# Patient Record
Sex: FEMALE | Race: WHITE | URBAN - METROPOLITAN AREA
[De-identification: names, ages, dates, MRNs, and addresses within clinical notes are randomized per-mention and may not be internally consistent; named-entity substitution may affect disease eponyms.]

---

## 2018-02-13 ENCOUNTER — APPOINTMENT (RX ONLY)
Dept: URBAN - METROPOLITAN AREA CLINIC 4 | Facility: CLINIC | Age: 78
Setting detail: DERMATOLOGY
End: 2018-02-13

## 2018-02-13 DIAGNOSIS — D485 NEOPLASM OF UNCERTAIN BEHAVIOR OF SKIN: ICD-10-CM

## 2018-02-13 PROBLEM — Z85.828 PERSONAL HISTORY OF OTHER MALIGNANT NEOPLASM OF SKIN: Status: ACTIVE | Noted: 2018-02-13

## 2018-02-13 PROBLEM — E03.9 HYPOTHYROIDISM, UNSPECIFIED: Status: ACTIVE | Noted: 2018-02-13

## 2018-02-13 PROBLEM — D48.5 NEOPLASM OF UNCERTAIN BEHAVIOR OF SKIN: Status: ACTIVE | Noted: 2018-02-13

## 2018-02-13 PROBLEM — L57.0 ACTINIC KERATOSIS: Status: ACTIVE | Noted: 2018-02-13

## 2018-02-13 PROCEDURE — ? BIOPSY BY SHAVE METHOD

## 2018-02-13 PROCEDURE — 11100: CPT

## 2018-02-13 PROCEDURE — 11101: CPT

## 2018-02-13 ASSESSMENT — LOCATION DETAILED DESCRIPTION DERM
LOCATION DETAILED: RIGHT MEDIAL PROXIMAL PRETIBIAL REGION
LOCATION DETAILED: RIGHT RIB CAGE

## 2018-02-13 ASSESSMENT — LOCATION SIMPLE DESCRIPTION DERM
LOCATION SIMPLE: RIGHT PRETIBIAL REGION
LOCATION SIMPLE: ABDOMEN

## 2018-02-13 ASSESSMENT — LOCATION ZONE DERM
LOCATION ZONE: TRUNK
LOCATION ZONE: LEG

## 2018-02-13 NOTE — PROCEDURE: BIOPSY BY SHAVE METHOD
Billing Type: Third-Party Bill
Biopsy Type: H and E
Post-Care Instructions: I reviewed with the patient in detail post-care instructions. Patient is to keep the biopsy site dry overnight, and then apply bacitracin twice daily until healed. Patient may apply hydrogen peroxide soaks to remove any crusting.
Curettage Text: The wound bed was treated with curettage after the biopsy was performed.
Cryotherapy Text: The wound bed was treated with cryotherapy after the biopsy was performed.
Additional Anesthesia Volume In Cc (Will Not Render If 0): 0
Biopsy Method: Personna blade
Electrodesiccation Text: The wound bed was treated with electrodesiccation after the biopsy was performed.
Dressing: bandage
Bill For Surgical Tray: no
Silver Nitrate Text: The wound bed was treated with silver nitrate after the biopsy was performed.
Lab: [id number]
Anesthesia Type: 1% lidocaine with epinephrine
Detail Level: Detailed
Electrodesiccation And Curettage Text: The wound bed was treated with electrodesiccation and curettage after the biopsy was performed.
Destruction After The Procedure: Yes
Lab Facility: 
Anesthesia Volume In Cc: 2
Notification Instructions: Patient will be notified of biopsy results. However, patient instructed to call the office if not contacted within 2 weeks.
Hemostasis: Aluminum Chloride and Electrocautery
Wound Care: Vaseline
Consent: Written consent was obtained and risks were reviewed including but not limited to scarring, infection, bleeding, scabbing, incomplete removal, nerve damage and allergy to anesthesia.
Type Of Destruction Used: Electrodesiccation and Curettage
Type Of Destruction Used: Electrodesiccation

## 2018-02-13 NOTE — HPI: SKIN LESION
Is This A New Presentation, Or A Follow-Up?: Skin Lesion
How Severe Is Your Skin Lesion?: moderate
Has Your Skin Lesion Been Treated?: not been treated
Additional History: Spot on right calf for two months.

## 2018-03-14 ENCOUNTER — APPOINTMENT (RX ONLY)
Dept: URBAN - METROPOLITAN AREA CLINIC 4 | Facility: CLINIC | Age: 78
Setting detail: DERMATOLOGY
End: 2018-03-14

## 2018-03-14 PROBLEM — C44.722 SQUAMOUS CELL CARCINOMA OF SKIN OF RIGHT LOWER LIMB, INCLUDING HIP: Status: ACTIVE | Noted: 2018-03-14

## 2018-03-14 PROCEDURE — 12032 INTMD RPR S/A/T/EXT 2.6-7.5: CPT

## 2018-03-14 PROCEDURE — ? EXCISION

## 2018-03-14 PROCEDURE — 11602 EXC TR-EXT MAL+MARG 1.1-2 CM: CPT

## 2018-03-14 NOTE — PROCEDURE: EXCISION
Epidermal Autograft Text: The defect edges were debeveled with a #15 scalpel blade.  Given the location of the defect, shape of the defect and the proximity to free margins an epidermal autograft was deemed most appropriate.  Using a sterile surgical marker, the primary defect shape was transferred to the donor site. The epidermal graft was then harvested.  The skin graft was then placed in the primary defect and oriented appropriately.
Epidermal Closure Graft Donor Site (Optional): simple interrupted
Transposition Flap Text: The defect edges were debeveled with a #15 scalpel blade.  Given the location of the defect and the proximity to free margins a transposition flap was deemed most appropriate.  Using a sterile surgical marker, an appropriate transposition flap was drawn incorporating the defect.    The area thus outlined was incised deep to adipose tissue with a #15 scalpel blade.  The skin margins were undermined to an appropriate distance in all directions utilizing iris scissors.
Complex Repair And Xenograft Text: The defect edges were debeveled with a #15 scalpel blade.  The primary defect was closed partially with a complex linear closure.  Given the location of the defect, shape of the defect and the proximity to free margins a xenograft was deemed most appropriate to repair the remaining defect.  The graft was trimmed to fit the size of the remaining defect.  The graft was then placed in the primary defect, oriented appropriately, and sutured into place.
Ear Star Wedge Flap Text: The defect edges were debeveled with a #15 blade scalpel.  Given the location of the defect and the proximity to free margins (helical rim) an ear star wedge flap was deemed most appropriate.  Using a sterile surgical marker, the appropriate flap was drawn incorporating the defect and placing the expected incisions between the helical rim and antihelix where possible.  The area thus outlined was incised through and through with a #15 scalpel blade.
Hatchet Flap Text: The defect edges were debeveled with a #15 scalpel blade.  Given the location of the defect, shape of the defect and the proximity to free margins a hatchet flap was deemed most appropriate.  Using a sterile surgical marker, an appropriate hatchet flap was drawn incorporating the defect and placing the expected incisions within the relaxed skin tension lines where possible.    The area thus outlined was incised deep to adipose tissue with a #15 scalpel blade.  The skin margins were undermined to an appropriate distance in all directions utilizing iris scissors.
Patient Will Remove Sutures At Home?: No
Burow's Advancement Flap Text: The defect edges were debeveled with a #15 scalpel blade.  Given the location of the defect and the proximity to free margins a Burow's advancement flap was deemed most appropriate.  Using a sterile surgical marker, the appropriate advancement flap was drawn incorporating the defect and placing the expected incisions within the relaxed skin tension lines where possible.    The area thus outlined was incised deep to adipose tissue with a #15 scalpel blade.  The skin margins were undermined to an appropriate distance in all directions utilizing iris scissors.
Complex Repair And Melolabial Flap Text: The defect edges were debeveled with a #15 scalpel blade.  The primary defect was closed partially with a complex linear closure.  Given the location of the remaining defect, shape of the defect and the proximity to free margins a melolabial flap was deemed most appropriate for complete closure of the defect.  Using a sterile surgical marker, an appropriate advancement flap was drawn incorporating the defect and placing the expected incisions within the relaxed skin tension lines where possible.    The area thus outlined was incised deep to adipose tissue with a #15 scalpel blade.  The skin margins were undermined to an appropriate distance in all directions utilizing iris scissors.
Complex Repair And O-T Advancement Flap Text: The defect edges were debeveled with a #15 scalpel blade.  The primary defect was closed partially with a complex linear closure.  Given the location of the remaining defect, shape of the defect and the proximity to free margins an O-T advancement flap was deemed most appropriate for complete closure of the defect.  Using a sterile surgical marker, an appropriate advancement flap was drawn incorporating the defect and placing the expected incisions within the relaxed skin tension lines where possible.    The area thus outlined was incised deep to adipose tissue with a #15 scalpel blade.  The skin margins were undermined to an appropriate distance in all directions utilizing iris scissors.
Partial Purse String (Intermediate) Text: Given the location of the defect and the characteristics of the surrounding skin an intermediate purse string closure was deemed most appropriate.  Undermining was performed circumferentially around the surgical defect.  A purse string suture was then placed and tightened. Wound tension of the circular defect prevented complete closure of the wound.
Repair Type: Intermediate
Intermediate / Complex Repair - Final Wound Length In Cm: 3.3
Complex Repair Preamble Text (Leave Blank If You Do Not Want): Extensive wide undermining was performed.
Show Accession Variable: Yes
O-L Flap Text: The defect edges were debeveled with a #15 scalpel blade.  Given the location of the defect, shape of the defect and the proximity to free margins an O-L flap was deemed most appropriate.  Using a sterile surgical marker, an appropriate advancement flap was drawn incorporating the defect and placing the expected incisions within the relaxed skin tension lines where possible.    The area thus outlined was incised deep to adipose tissue with a #15 scalpel blade.  The skin margins were undermined to an appropriate distance in all directions utilizing iris scissors.
Purse String (Intermediate) Text: Given the location of the defect and the characteristics of the surrounding skin a purse string intermediate closure was deemed most appropriate.  Undermining was performed circumfirentially around the surgical defect.  A purse string suture was then placed and tightened.
Anesthesia Volume In Cc: 6
Trilobed Flap Text: The defect edges were debeveled with a #15 scalpel blade.  Given the location of the defect and the proximity to free margins a trilobed flap was deemed most appropriate.  Using a sterile surgical marker, an appropriate trilobed flap drawn around the defect.    The area thus outlined was incised deep to adipose tissue with a #15 scalpel blade.  The skin margins were undermined to an appropriate distance in all directions utilizing iris scissors.
Path Notes (To The Dermatopathologist): Please check margins.
Muscle Hinge Flap Text: The defect edges were debeveled with a #15 scalpel blade.  Given the size, depth and location of the defect and the proximity to free margins a muscle hinge flap was deemed most appropriate.  Using a sterile surgical marker, an appropriate hinge flap was drawn incorporating the defect. The area thus outlined was incised with a #15 scalpel blade.  The skin margins were undermined to an appropriate distance in all directions utilizing iris scissors.
Crescentic Advancement Flap Text: The defect edges were debeveled with a #15 scalpel blade.  Given the location of the defect and the proximity to free margins a crescentic advancement flap was deemed most appropriate.  Using a sterile surgical marker, the appropriate advancement flap was drawn incorporating the defect and placing the expected incisions within the relaxed skin tension lines where possible.    The area thus outlined was incised deep to adipose tissue with a #15 scalpel blade.  The skin margins were undermined to an appropriate distance in all directions utilizing iris scissors.
O-Z Plasty Text: The defect edges were debeveled with a #15 scalpel blade.  Given the location of the defect, shape of the defect and the proximity to free margins an O-Z plasty (double transposition flap) was deemed most appropriate.  Using a sterile surgical marker, the appropriate transposition flaps were drawn incorporating the defect and placing the expected incisions within the relaxed skin tension lines where possible.    The area thus outlined was incised deep to adipose tissue with a #15 scalpel blade.  The skin margins were undermined to an appropriate distance in all directions utilizing iris scissors.  Hemostasis was achieved with electrocautery.  The flaps were then transposed into place, one clockwise and the other counterclockwise, and anchored with interrupted buried subcutaneous sutures.
Complex Repair And Rotation Flap Text: The defect edges were debeveled with a #15 scalpel blade.  The primary defect was closed partially with a complex linear closure.  Given the location of the remaining defect, shape of the defect and the proximity to free margins a rotation flap was deemed most appropriate for complete closure of the defect.  Using a sterile surgical marker, an appropriate advancement flap was drawn incorporating the defect and placing the expected incisions within the relaxed skin tension lines where possible.    The area thus outlined was incised deep to adipose tissue with a #15 scalpel blade.  The skin margins were undermined to an appropriate distance in all directions utilizing iris scissors.
Suture Removal: 12 days
V-Y Flap Text: The defect edges were debeveled with a #15 scalpel blade.  Given the location of the defect, shape of the defect and the proximity to free margins a V-Y flap was deemed most appropriate.  Using a sterile surgical marker, an appropriate advancement flap was drawn incorporating the defect and placing the expected incisions within the relaxed skin tension lines where possible.    The area thus outlined was incised deep to adipose tissue with a #15 scalpel blade.  The skin margins were undermined to an appropriate distance in all directions utilizing iris scissors.
Cheek Interpolation Flap Text: A decision was made to reconstruct the defect utilizing an interpolation axial flap and a staged reconstruction.  A telfa template was made of the defect.  This telfa template was then used to outline the Cheek Interpolation flap.  The donor area for the pedicle flap was then injected with anesthesia.  The flap was excised through the skin and subcutaneous tissue down to the layer of the underlying musculature.  The interpolation flap was carefully excised within this deep plane to maintain its blood supply.  The edges of the donor site were undermined.   The donor site was closed in a primary fashion.  The pedicle was then rotated into position and sutured.  Once the tube was sutured into place, adequate blood supply was confirmed with blanching and refill.  The pedicle was then wrapped with xeroform gauze and dressed appropriately with a telfa and gauze bandage to ensure continued blood supply and protect the attached pedicle.
Cheek-To-Nose Interpolation Flap Text: A decision was made to reconstruct the defect utilizing an interpolation axial flap and a staged reconstruction.  A telfa template was made of the defect.  This telfa template was then used to outline the Cheek-To-Nose Interpolation flap.  The donor area for the pedicle flap was then injected with anesthesia.  The flap was excised through the skin and subcutaneous tissue down to the layer of the underlying musculature.  The interpolation flap was carefully excised within this deep plane to maintain its blood supply.  The edges of the donor site were undermined.   The donor site was closed in a primary fashion.  The pedicle was then rotated into position and sutured.  Once the tube was sutured into place, adequate blood supply was confirmed with blanching and refill.  The pedicle was then wrapped with xeroform gauze and dressed appropriately with a telfa and gauze bandage to ensure continued blood supply and protect the attached pedicle.
Crescentic Intermediate Repair Preamble Text (Leave Blank If You Do Not Want): Undermining was performed with blunt dissection.
Perilesional Excision Additional Text (Leave Blank If You Do Not Want): The margin was drawn around the clinically apparent lesion. Incisions were then made along these lines to the appropriate tissue plane and the lesion was extirpated.
Epidermal Closure: running cuticular
Star Wedge Flap Text: The defect edges were debeveled with a #15 scalpel blade.  Given the location of the defect, shape of the defect and the proximity to free margins a star wedge flap was deemed most appropriate.  Using a sterile surgical marker, an appropriate rotation flap was drawn incorporating the defect and placing the expected incisions within the relaxed skin tension lines where possible. The area thus outlined was incised deep to adipose tissue with a #15 scalpel blade.  The skin margins were undermined to an appropriate distance in all directions utilizing iris scissors.
Complex Repair And Double M Plasty Text: The defect edges were debeveled with a #15 scalpel blade.  The primary defect was closed partially with a complex linear closure.  Given the location of the remaining defect, shape of the defect and the proximity to free margins a double M plasty was deemed most appropriate for complete closure of the defect.  Using a sterile surgical marker, an appropriate advancement flap was drawn incorporating the defect and placing the expected incisions within the relaxed skin tension lines where possible.    The area thus outlined was incised deep to adipose tissue with a #15 scalpel blade.  The skin margins were undermined to an appropriate distance in all directions utilizing iris scissors.
Estimated Blood Loss (Cc): minimal
Complex Repair And Bilobe Flap Text: The defect edges were debeveled with a #15 scalpel blade.  The primary defect was closed partially with a complex linear closure.  Given the location of the remaining defect, shape of the defect and the proximity to free margins a bilobe flap was deemed most appropriate for complete closure of the defect.  Using a sterile surgical marker, an appropriate advancement flap was drawn incorporating the defect and placing the expected incisions within the relaxed skin tension lines where possible.    The area thus outlined was incised deep to adipose tissue with a #15 scalpel blade.  The skin margins were undermined to an appropriate distance in all directions utilizing iris scissors.
Rotation Flap Text: The defect edges were debeveled with a #15 scalpel blade.  Given the location of the defect, shape of the defect and the proximity to free margins a rotation flap was deemed most appropriate.  Using a sterile surgical marker, an appropriate rotation flap was drawn incorporating the defect and placing the expected incisions within the relaxed skin tension lines where possible.    The area thus outlined was incised deep to adipose tissue with a #15 scalpel blade.  The skin margins were undermined to an appropriate distance in all directions utilizing iris scissors.
Mucosal Advancement Flap Text: Given the location of the defect, shape of the defect and the proximity to free margins a mucosal advancement flap was deemed most appropriate. Incisions were made with a 15 blade scalpel in the appropriate fashion along the cutaneous vermilion border and the mucosal lip. The remaining actinically damaged mucosal tissue was excised.  The mucosal advancement flap was then elevated to the gingival sulcus with care taken to preserve the neurovascular structures and advanced into the primary defect. Care was taken to ensure that precise realignment of the vermilion border was achieved.
Complex Repair And Skin Substitute Graft Text: The defect edges were debeveled with a #15 scalpel blade.  The primary defect was closed partially with a complex linear closure.  Given the location of the remaining defect, shape of the defect and the proximity to free margins a skin substitute graft was deemed most appropriate to repair the remaining defect.  The graft was trimmed to fit the size of the remaining defect.  The graft was then placed in the primary defect, oriented appropriately, and sutured into place.
Excisional Biopsy Additional Text (Leave Blank If You Do Not Want): The margin was drawn around the clinically apparent lesion. An elliptical shape was then drawn on the skin incorporating the lesion and margins.  Incisions were then made along these lines to the appropriate tissue plane and the lesion was extirpated.
A-T Advancement Flap Text: The defect edges were debeveled with a #15 scalpel blade.  Given the location of the defect, shape of the defect and the proximity to free margins an A-T advancement flap was deemed most appropriate.  Using a sterile surgical marker, an appropriate advancement flap was drawn incorporating the defect and placing the expected incisions within the relaxed skin tension lines where possible.    The area thus outlined was incised deep to adipose tissue with a #15 scalpel blade.  The skin margins were undermined to an appropriate distance in all directions utilizing iris scissors.
Number Of Deep Sutures (Optional): 2
Complex Repair And Z Plasty Text: The defect edges were debeveled with a #15 scalpel blade.  The primary defect was closed partially with a complex linear closure.  Given the location of the remaining defect, shape of the defect and the proximity to free margins a Z plasty was deemed most appropriate for complete closure of the defect.  Using a sterile surgical marker, an appropriate advancement flap was drawn incorporating the defect and placing the expected incisions within the relaxed skin tension lines where possible.    The area thus outlined was incised deep to adipose tissue with a #15 scalpel blade.  The skin margins were undermined to an appropriate distance in all directions utilizing iris scissors.
Bilateral Helical Rim Advancement Flap Text: The defect edges were debeveled with a #15 blade scalpel.  Given the location of the defect and the proximity to free margins (helical rim) a bilateral helical rim advancement flap was deemed most appropriate.  Using a sterile surgical marker, the appropriate advancement flaps were drawn incorporating the defect and placing the expected incisions between the helical rim and antihelix where possible.  The area thus outlined was incised through and through with a #15 scalpel blade.  With a skin hook and iris scissors, the flaps were gently and sharply undermined and freed up.
Complex Repair And O-L Flap Text: The defect edges were debeveled with a #15 scalpel blade.  The primary defect was closed partially with a complex linear closure.  Given the location of the remaining defect, shape of the defect and the proximity to free margins an O-L flap was deemed most appropriate for complete closure of the defect.  Using a sterile surgical marker, an appropriate flap was drawn incorporating the defect and placing the expected incisions within the relaxed skin tension lines where possible.    The area thus outlined was incised deep to adipose tissue with a #15 scalpel blade.  The skin margins were undermined to an appropriate distance in all directions utilizing iris scissors.
Purse String (Simple) Text: Given the location of the defect and the characteristics of the surrounding skin a purse string simple closure was deemed most appropriate.  Undermining was performed circumferentially around the surgical defect.  A purse string suture was then placed and tightened.
Lab: [id number]
Complex Repair And M Plasty Text: The defect edges were debeveled with a #15 scalpel blade.  The primary defect was closed partially with a complex linear closure.  Given the location of the remaining defect, shape of the defect and the proximity to free margins an M plasty was deemed most appropriate for complete closure of the defect.  Using a sterile surgical marker, an appropriate advancement flap was drawn incorporating the defect and placing the expected incisions within the relaxed skin tension lines where possible.    The area thus outlined was incised deep to adipose tissue with a #15 scalpel blade.  The skin margins were undermined to an appropriate distance in all directions utilizing iris scissors.
S Plasty Text: Given the location and shape of the defect, and the orientation of relaxed skin tension lines, an S-plasty was deemed most appropriate for repair.  Using a sterile surgical marker, the appropriate outline of the S-plasty was drawn, incorporating the defect and placing the expected incisions within the relaxed skin tension lines where possible.  The area thus outlined was incised deep to adipose tissue with a #15 scalpel blade.  The skin margins were undermined to an appropriate distance in all directions utilizing iris scissors. The skin flaps were advanced over the defect.  The opposing margins were then approximated with interrupted buried subcutaneous sutures.
Ftsg Text: The defect edges were debeveled with a #15 scalpel blade.  Given the location of the defect, shape of the defect and the proximity to free margins a full thickness skin graft was deemed most appropriate.  Using a sterile surgical marker, the primary defect shape was transferred to the donor site. The area thus outlined was incised deep to adipose tissue with a #15 scalpel blade.  The harvested graft was then trimmed of adipose tissue until only dermis and epidermis was left.  The skin margins of the secondary defect were undermined to an appropriate distance in all directions utilizing iris scissors.  The secondary defect was closed with interrupted buried subcutaneous sutures.  The skin edges were then re-apposed with running  sutures.  The skin graft was then placed in the primary defect and oriented appropriately.
Complex Repair And Double Advancement Flap Text: The defect edges were debeveled with a #15 scalpel blade.  The primary defect was closed partially with a complex linear closure.  Given the location of the remaining defect, shape of the defect and the proximity to free margins a double advancement flap was deemed most appropriate for complete closure of the defect.  Using a sterile surgical marker, an appropriate advancement flap was drawn incorporating the defect and placing the expected incisions within the relaxed skin tension lines where possible.    The area thus outlined was incised deep to adipose tissue with a #15 scalpel blade.  The skin margins were undermined to an appropriate distance in all directions utilizing iris scissors.
Secondary Defect Width (In Cm): 0
Split-Thickness Skin Graft Text: The defect edges were debeveled with a #15 scalpel blade.  Given the location of the defect, shape of the defect and the proximity to free margins a split thickness skin graft was deemed most appropriate.  Using a sterile surgical marker, the primary defect shape was transferred to the donor site. The split thickness graft was then harvested.  The skin graft was then placed in the primary defect and oriented appropriately.
Dressing: pressure dressing
Melolabial Transposition Flap Text: The defect edges were debeveled with a #15 scalpel blade.  Given the location of the defect and the proximity to free margins a melolabial flap was deemed most appropriate.  Using a sterile surgical marker, an appropriate melolabial transposition flap was drawn incorporating the defect.    The area thus outlined was incised deep to adipose tissue with a #15 scalpel blade.  The skin margins were undermined to an appropriate distance in all directions utilizing iris scissors.
Repair Performed By Another Provider Text (Leave Blank If You Do Not Want): After the tissue was excised the defect was repaired by another provider.
Z Plasty Text: The lesion was extirpated to the level of the fat with a #15 scalpel blade.  Given the location of the defect, shape of the defect and the proximity to free margins a Z-plasty was deemed most appropriate for repair.  Using a sterile surgical marker, the appropriate transposition arms of the Z-plasty were drawn incorporating the defect and placing the expected incisions within the relaxed skin tension lines where possible.    The area thus outlined was incised deep to adipose tissue with a #15 scalpel blade.  The skin margins were undermined to an appropriate distance in all directions utilizing iris scissors.  The opposing transposition arms were then transposed into place in opposite direction and anchored with interrupted buried subcutaneous sutures.
Spiral Flap Text: The defect edges were debeveled with a #15 scalpel blade.  Given the location of the defect, shape of the defect and the proximity to free margins a spiral flap was deemed most appropriate.  Using a sterile surgical marker, an appropriate rotation flap was drawn incorporating the defect and placing the expected incisions within the relaxed skin tension lines where possible. The area thus outlined was incised deep to adipose tissue with a #15 scalpel blade.  The skin margins were undermined to an appropriate distance in all directions utilizing iris scissors.
Double Island Pedicle Flap Text: The defect edges were debeveled with a #15 scalpel blade.  Given the location of the defect, shape of the defect and the proximity to free margins a double island pedicle advancement flap was deemed most appropriate.  Using a sterile surgical marker, an appropriate advancement flap was drawn incorporating the defect, outlining the appropriate donor tissue and placing the expected incisions within the relaxed skin tension lines where possible.    The area thus outlined was incised deep to adipose tissue with a #15 scalpel blade.  The skin margins were undermined to an appropriate distance in all directions around the primary defect and laterally outward around the island pedicle utilizing iris scissors.  There was minimal undermining beneath the pedicle flap.
Lip Wedge Excision Repair Text: Given the location of the defect and the proximity to free margins a full thickness wedge repair was deemed most appropriate.  Using a sterile surgical marker, the appropriate repair was drawn incorporating the defect and placing the expected incisions perpendicular to the vermilion border.  The vermilion border was also meticulously outlined to ensure appropriate reapproximation during the repair.  The area thus outlined was incised through and through with a #15 scalpel blade.  The muscularis and dermis were reaproximated with deep sutures following hemostasis. Care was taken to realign the vermilion border before proceeding with the superficial closure.  Once the vermilion was realigned the superfical and mucosal closure was finished.
No Repair - Repaired With Adjacent Surgical Defect Text (Leave Blank If You Do Not Want): After the excision the defect was repaired concurrently with another surgical defect which was in close approximation.
Size Of Lesion In Cm: 0.7
Complex Repair And V-Y Plasty Text: The defect edges were debeveled with a #15 scalpel blade.  The primary defect was closed partially with a complex linear closure.  Given the location of the remaining defect, shape of the defect and the proximity to free margins a V-Y plasty was deemed most appropriate for complete closure of the defect.  Using a sterile surgical marker, an appropriate advancement flap was drawn incorporating the defect and placing the expected incisions within the relaxed skin tension lines where possible.    The area thus outlined was incised deep to adipose tissue with a #15 scalpel blade.  The skin margins were undermined to an appropriate distance in all directions utilizing iris scissors.
Alar Island Pedicle Flap Text: The defect edges were debeveled with a #15 scalpel blade.  Given the location of the defect, shape of the defect and the proximity to the alar rim an island pedicle advancement flap was deemed most appropriate.  Using a sterile surgical marker, an appropriate advancement flap was drawn incorporating the defect, outlining the appropriate donor tissue and placing the expected incisions within the nasal ala running parallel to the alar rim. The area thus outlined was incised with a #15 scalpel blade.  The skin margins were undermined minimally to an appropriate distance in all directions around the primary defect and laterally outward around the island pedicle utilizing iris scissors.  There was minimal undermining beneath the pedicle flap.
Modified Advancement Flap Text: The defect edges were debeveled with a #15 scalpel blade.  Given the location of the defect, shape of the defect and the proximity to free margins a modified advancement flap was deemed most appropriate.  Using a sterile surgical marker, an appropriate advancement flap was drawn incorporating the defect and placing the expected incisions within the relaxed skin tension lines where possible.    The area thus outlined was incised deep to adipose tissue with a #15 scalpel blade.  The skin margins were undermined to an appropriate distance in all directions utilizing iris scissors.
Bilobed Transposition Flap Text: The defect edges were debeveled with a #15 scalpel blade.  Given the location of the defect and the proximity to free margins a bilobed transposition flap was deemed most appropriate.  Using a sterile surgical marker, an appropriate bilobe flap drawn around the defect.    The area thus outlined was incised deep to adipose tissue with a #15 scalpel blade.  The skin margins were undermined to an appropriate distance in all directions utilizing iris scissors.
H Plasty Text: Given the location of the defect, shape of the defect and the proximity to free margins a H-plasty was deemed most appropriate for repair.  Using a sterile surgical marker, the appropriate advancement arms of the H-plasty were drawn incorporating the defect and placing the expected incisions within the relaxed skin tension lines where possible. The area thus outlined was incised deep to adipose tissue with a #15 scalpel blade. The skin margins were undermined to an appropriate distance in all directions utilizing iris scissors.  The opposing advancement arms were then advanced into place in opposite direction and anchored with interrupted buried subcutaneous sutures.
V-Y Plasty Text: The defect edges were debeveled with a #15 scalpel blade.  Given the location of the defect, shape of the defect and the proximity to free margins an V-Y advancement flap was deemed most appropriate.  Using a sterile surgical marker, an appropriate advancement flap was drawn incorporating the defect and placing the expected incisions within the relaxed skin tension lines where possible.    The area thus outlined was incised deep to adipose tissue with a #15 scalpel blade.  The skin margins were undermined to an appropriate distance in all directions utilizing iris scissors.
Paramedian Forehead Flap Text: A decision was made to reconstruct the defect utilizing an interpolation axial flap and a staged reconstruction.  A telfa template was made of the defect.  This telfa template was then used to outline the paramedian forehead pedicle flap.  The donor area for the pedicle flap was then injected with anesthesia.  The flap was excised through the skin and subcutaneous tissue down to the layer of the underlying musculature.  The pedicle flap was carefully excised within this deep plane to maintain its blood supply.  The edges of the donor site were undermined.   The donor site was closed in a primary fashion.  The pedicle was then rotated into position and sutured.  Once the tube was sutured into place, adequate blood supply was confirmed with blanching and refill.  The pedicle was then wrapped with xeroform gauze and dressed appropriately with a telfa and gauze bandage to ensure continued blood supply and protect the attached pedicle.
Graft Donor Site Bandage (Optional-Leave Blank If You Don't Want In Note): Steri-strips and a pressure bandage were applied to the donor site.
Tissue Cultured Epidermal Autograft Text: The defect edges were debeveled with a #15 scalpel blade.  Given the location of the defect, shape of the defect and the proximity to free margins a tissue cultured epidermal autograft was deemed most appropriate.  The graft was then trimmed to fit the size of the defect.  The graft was then placed in the primary defect and oriented appropriately.
Saucerization Excision Additional Text (Leave Blank If You Do Not Want): The margin was drawn around the clinically apparent lesion.  Incisions were then made along these lines, in a tangential fashion, to the appropriate tissue plane and the lesion was extirpated.
Slit Excision Additional Text (Leave Blank If You Do Not Want): A linear line was drawn on the skin overlying the lesion. An incision was made slowly until the lesion was visualized.  Once visualized, the lesion was removed with blunt dissection.
Skin Substitute Text: The defect edges were debeveled with a #15 scalpel blade.  Given the location of the defect, shape of the defect and the proximity to free margins a skin substitute graft was deemed most appropriate.  The graft material was trimmed to fit the size of the defect. The graft was then placed in the primary defect and oriented appropriately.
Helical Rim Advancement Flap Text: The defect edges were debeveled with a #15 blade scalpel.  Given the location of the defect and the proximity to free margins (helical rim) a double helical rim advancement flap was deemed most appropriate.  Using a sterile surgical marker, the appropriate advancement flaps were drawn incorporating the defect and placing the expected incisions between the helical rim and antihelix where possible.  The area thus outlined was incised through and through with a #15 scalpel blade.  With a skin hook and iris scissors, the flaps were gently and sharply undermined and freed up.
Complex Repair And Dorsal Nasal Flap Text: The defect edges were debeveled with a #15 scalpel blade.  The primary defect was closed partially with a complex linear closure.  Given the location of the remaining defect, shape of the defect and the proximity to free margins a dorsal nasal flap was deemed most appropriate for complete closure of the defect.  Using a sterile surgical marker, an appropriate flap was drawn incorporating the defect and placing the expected incisions within the relaxed skin tension lines where possible.    The area thus outlined was incised deep to adipose tissue with a #15 scalpel blade.  The skin margins were undermined to an appropriate distance in all directions utilizing iris scissors.
Cartilage Graft Text: The defect edges were debeveled with a #15 scalpel blade.  Given the location of the defect, shape of the defect, the fact the defect involved a full thickness cartilage defect a cartilage graft was deemed most appropriate.  An appropriate donor site was identified, cleansed, and anesthetized. The cartilage graft was then harvested and transferred to the recipient site, oriented appropriately and then sutured into place.  The secondary defect was then repaired using a primary closure.
Island Pedicle Flap With Canthal Suspension Text: The defect edges were debeveled with a #15 scalpel blade.  Given the location of the defect, shape of the defect and the proximity to free margins an island pedicle advancement flap was deemed most appropriate.  Using a sterile surgical marker, an appropriate advancement flap was drawn incorporating the defect, outlining the appropriate donor tissue and placing the expected incisions within the relaxed skin tension lines where possible. The area thus outlined was incised deep to adipose tissue with a #15 scalpel blade.  The skin margins were undermined to an appropriate distance in all directions around the primary defect and laterally outward around the island pedicle utilizing iris scissors.  There was minimal undermining beneath the pedicle flap. A suspension suture was placed in the canthal tendon to prevent tension and prevent ectropion.
Melolabial Interpolation Flap Text: A decision was made to reconstruct the defect utilizing an interpolation axial flap and a staged reconstruction.  A telfa template was made of the defect.  This telfa template was then used to outline the melolabial interpolation flap.  The donor area for the pedicle flap was then injected with anesthesia.  The flap was excised through the skin and subcutaneous tissue down to the layer of the underlying musculature.  The pedicle flap was carefully excised within this deep plane to maintain its blood supply.  The edges of the donor site were undermined.   The donor site was closed in a primary fashion.  The pedicle was then rotated into position and sutured.  Once the tube was sutured into place, adequate blood supply was confirmed with blanching and refill.  The pedicle was then wrapped with xeroform gauze and dressed appropriately with a telfa and gauze bandage to ensure continued blood supply and protect the attached pedicle.
Rhombic Flap Text: The defect edges were debeveled with a #15 scalpel blade.  Given the location of the defect and the proximity to free margins a rhombic flap was deemed most appropriate.  Using a sterile surgical marker, an appropriate rhombic flap was drawn incorporating the defect.    The area thus outlined was incised deep to adipose tissue with a #15 scalpel blade.  The skin margins were undermined to an appropriate distance in all directions utilizing iris scissors.
Scalpel Size: 15 blade
Keystone Flap Text: The defect edges were debeveled with a #15 scalpel blade.  Given the location of the defect, shape of the defect a keystone flap was deemed most appropriate.  Using a sterile surgical marker, an appropriate keystone flap was drawn incorporating the defect, outlining the appropriate donor tissue and placing the expected incisions within the relaxed skin tension lines where possible. The area thus outlined was incised deep to adipose tissue with a #15 scalpel blade.  The skin margins were undermined to an appropriate distance in all directions around the primary defect and laterally outward around the flap utilizing iris scissors.
Wound Care: Vaseline
Complex Repair And Modified Advancement Flap Text: The defect edges were debeveled with a #15 scalpel blade.  The primary defect was closed partially with a complex linear closure.  Given the location of the remaining defect, shape of the defect and the proximity to free margins a modified advancement flap was deemed most appropriate for complete closure of the defect.  Using a sterile surgical marker, an appropriate advancement flap was drawn incorporating the defect and placing the expected incisions within the relaxed skin tension lines where possible.    The area thus outlined was incised deep to adipose tissue with a #15 scalpel blade.  The skin margins were undermined to an appropriate distance in all directions utilizing iris scissors.
Surgeon Performing The Repair (Optional): [place]
Advancement Flap (Single) Text: The defect edges were debeveled with a #15 scalpel blade.  Given the location of the defect and the proximity to free margins a single advancement flap was deemed most appropriate.  Using a sterile surgical marker, an appropriate advancement flap was drawn incorporating the defect and placing the expected incisions within the relaxed skin tension lines where possible.    The area thus outlined was incised deep to adipose tissue with a #15 scalpel blade.  The skin margins were undermined to an appropriate distance in all directions utilizing iris scissors.
Bilobed Flap Text: The defect edges were debeveled with a #15 scalpel blade.  Given the location of the defect and the proximity to free margins a bilobe flap was deemed most appropriate.  Using a sterile surgical marker, an appropriate bilobe flap drawn around the defect.    The area thus outlined was incised deep to adipose tissue with a #15 scalpel blade.  The skin margins were undermined to an appropriate distance in all directions utilizing iris scissors.
Billing Type: Third-Party Bill
Island Pedicle Flap-Requiring Vessel Identification Text: The defect edges were debeveled with a #15 scalpel blade.  Given the location of the defect, shape of the defect and the proximity to free margins an island pedicle advancement flap was deemed most appropriate.  Using a sterile surgical marker, an appropriate advancement flap was drawn, based on the axial vessel mentioned above, incorporating the defect, outlining the appropriate donor tissue and placing the expected incisions within the relaxed skin tension lines where possible.    The area thus outlined was incised deep to adipose tissue with a #15 scalpel blade.  The skin margins were undermined to an appropriate distance in all directions around the primary defect and laterally outward around the island pedicle utilizing iris scissors.  There was minimal undermining beneath the pedicle flap.
Consent was obtained from the patient. The risks and benefits to therapy were discussed in detail. Specifically, the risks of infection, scarring, bleeding, prolonged wound healing, incomplete removal, allergy to anesthesia, nerve injury and recurrence were addressed. Prior to the procedure, the treatment site was clearly identified and confirmed by the patient. All components of Universal Protocol/PAUSE Rule completed.
Fusiform Excision Additional Text (Leave Blank If You Do Not Want): The margin was drawn around the clinically apparent lesion.  A fusiform shape was then drawn on the skin incorporating the lesion and margins.  Incisions were then made along these lines to the appropriate tissue plane and the lesion was extirpated.
Complex Repair And Single Advancement Flap Text: The defect edges were debeveled with a #15 scalpel blade.  The primary defect was closed partially with a complex linear closure.  Given the location of the remaining defect, shape of the defect and the proximity to free margins a single advancement flap was deemed most appropriate for complete closure of the defect.  Using a sterile surgical marker, an appropriate advancement flap was drawn incorporating the defect and placing the expected incisions within the relaxed skin tension lines where possible.    The area thus outlined was incised deep to adipose tissue with a #15 scalpel blade.  The skin margins were undermined to an appropriate distance in all directions utilizing iris scissors.
Complex Repair And Rhombic Flap Text: The defect edges were debeveled with a #15 scalpel blade.  The primary defect was closed partially with a complex linear closure.  Given the location of the remaining defect, shape of the defect and the proximity to free margins a rhombic flap was deemed most appropriate for complete closure of the defect.  Using a sterile surgical marker, an appropriate advancement flap was drawn incorporating the defect and placing the expected incisions within the relaxed skin tension lines where possible.    The area thus outlined was incised deep to adipose tissue with a #15 scalpel blade.  The skin margins were undermined to an appropriate distance in all directions utilizing iris scissors.
Lab Facility: 
Dermal Autograft Text: The defect edges were debeveled with a #15 scalpel blade.  Given the location of the defect, shape of the defect and the proximity to free margins a dermal autograft was deemed most appropriate.  Using a sterile surgical marker, the primary defect shape was transferred to the donor site. The area thus outlined was incised deep to adipose tissue with a #15 scalpel blade.  The harvested graft was then trimmed of adipose and epidermal tissue until only dermis was left.  The skin graft was then placed in the primary defect and oriented appropriately.
O-T Advancement Flap Text: The defect edges were debeveled with a #15 scalpel blade.  Given the location of the defect, shape of the defect and the proximity to free margins an O-T advancement flap was deemed most appropriate.  Using a sterile surgical marker, an appropriate advancement flap was drawn incorporating the defect and placing the expected incisions within the relaxed skin tension lines where possible.    The area thus outlined was incised deep to adipose tissue with a #15 scalpel blade.  The skin margins were undermined to an appropriate distance in all directions utilizing iris scissors.
Complex Repair And Ftsg Text: The defect edges were debeveled with a #15 scalpel blade.  The primary defect was closed partially with a complex linear closure.  Given the location of the defect, shape of the defect and the proximity to free margins a full thickness skin graft was deemed most appropriate to repair the remaining defect.  The graft was trimmed to fit the size of the remaining defect.  The graft was then placed in the primary defect, oriented appropriately, and sutured into place.
Deep Sutures: 4-0 PGA
Island Pedicle Flap Text: The defect edges were debeveled with a #15 scalpel blade.  Given the location of the defect, shape of the defect and the proximity to free margins an island pedicle advancement flap was deemed most appropriate.  Using a sterile surgical marker, an appropriate advancement flap was drawn incorporating the defect, outlining the appropriate donor tissue and placing the expected incisions within the relaxed skin tension lines where possible.    The area thus outlined was incised deep to adipose tissue with a #15 scalpel blade.  The skin margins were undermined to an appropriate distance in all directions around the primary defect and laterally outward around the island pedicle utilizing iris scissors.  There was minimal undermining beneath the pedicle flap.
Complex Repair And Transposition Flap Text: The defect edges were debeveled with a #15 scalpel blade.  The primary defect was closed partially with a complex linear closure.  Given the location of the remaining defect, shape of the defect and the proximity to free margins a transposition flap was deemed most appropriate for complete closure of the defect.  Using a sterile surgical marker, an appropriate advancement flap was drawn incorporating the defect and placing the expected incisions within the relaxed skin tension lines where possible.    The area thus outlined was incised deep to adipose tissue with a #15 scalpel blade.  The skin margins were undermined to an appropriate distance in all directions utilizing iris scissors.
W Plasty Text: The lesion was extirpated to the level of the fat with a #15 scalpel blade.  Given the location of the defect, shape of the defect and the proximity to free margins a W-plasty was deemed most appropriate for repair.  Using a sterile surgical marker, the appropriate transposition arms of the W-plasty were drawn incorporating the defect and placing the expected incisions within the relaxed skin tension lines where possible.    The area thus outlined was incised deep to adipose tissue with a #15 scalpel blade.  The skin margins were undermined to an appropriate distance in all directions utilizing iris scissors.  The opposing transposition arms were then transposed into place in opposite direction and anchored with interrupted buried subcutaneous sutures.
Complex Repair And Tissue Cultured Epidermal Autograft Text: The defect edges were debeveled with a #15 scalpel blade.  The primary defect was closed partially with a complex linear closure.  Given the location of the defect, shape of the defect and the proximity to free margins an tissue cultured epidermal autograft was deemed most appropriate to repair the remaining defect.  The graft was trimmed to fit the size of the remaining defect.  The graft was then placed in the primary defect, oriented appropriately, and sutured into place.
Complex Repair And Epidermal Autograft Text: The defect edges were debeveled with a #15 scalpel blade.  The primary defect was closed partially with a complex linear closure.  Given the location of the defect, shape of the defect and the proximity to free margins an epidermal autograft was deemed most appropriate to repair the remaining defect.  The graft was trimmed to fit the size of the remaining defect.  The graft was then placed in the primary defect, oriented appropriately, and sutured into place.
Complex Repair And A-T Advancement Flap Text: The defect edges were debeveled with a #15 scalpel blade.  The primary defect was closed partially with a complex linear closure.  Given the location of the remaining defect, shape of the defect and the proximity to free margins an A-T advancement flap was deemed most appropriate for complete closure of the defect.  Using a sterile surgical marker, an appropriate advancement flap was drawn incorporating the defect and placing the expected incisions within the relaxed skin tension lines where possible.    The area thus outlined was incised deep to adipose tissue with a #15 scalpel blade.  The skin margins were undermined to an appropriate distance in all directions utilizing iris scissors.
Mastoid Interpolation Flap Text: A decision was made to reconstruct the defect utilizing an interpolation axial flap and a staged reconstruction.  A telfa template was made of the defect.  This telfa template was then used to outline the mastoid interpolation flap.  The donor area for the pedicle flap was then injected with anesthesia.  The flap was excised through the skin and subcutaneous tissue down to the layer of the underlying musculature.  The pedicle flap was carefully excised within this deep plane to maintain its blood supply.  The edges of the donor site were undermined.   The donor site was closed in a primary fashion.  The pedicle was then rotated into position and sutured.  Once the tube was sutured into place, adequate blood supply was confirmed with blanching and refill.  The pedicle was then wrapped with xeroform gauze and dressed appropriately with a telfa and gauze bandage to ensure continued blood supply and protect the attached pedicle.
Anesthesia Type: 1% lidocaine with 1:100,000 epinephrine and a 1:10 solution of 8.4% sodium bicarbonate
Positioning (Leave Blank If You Do Not Want): The patient was placed in a comfortable position exposing the surgical site.
Dorsal Nasal Flap Text: The defect edges were debeveled with a #15 scalpel blade.  Given the location of the defect and the proximity to free margins a dorsal nasal flap was deemed most appropriate.  Using a sterile surgical marker, an appropriate dorsal nasal flap was drawn around the defect.    The area thus outlined was incised deep to adipose tissue with a #15 scalpel blade.  The skin margins were undermined to an appropriate distance in all directions utilizing iris scissors.
Bi-Rhombic Flap Text: The defect edges were debeveled with a #15 scalpel blade.  Given the location of the defect and the proximity to free margins a bi-rhombic flap was deemed most appropriate.  Using a sterile surgical marker, an appropriate rhombic flap was drawn incorporating the defect. The area thus outlined was incised deep to adipose tissue with a #15 scalpel blade.  The skin margins were undermined to an appropriate distance in all directions utilizing iris scissors.
Excision Method: Fusiform
Composite Graft Text: The defect edges were debeveled with a #15 scalpel blade.  Given the location of the defect, shape of the defect, the proximity to free margins and the fact the defect was full thickness a composite graft was deemed most appropriate.  The defect was outline and then transferred to the donor site.  A full thickness graft was then excised from the donor site. The graft was then placed in the primary defect, oriented appropriately and then sutured into place.  The secondary defect was then repaired using a primary closure.
Elliptical Excision Additional Text (Leave Blank If You Do Not Want): The margin was drawn around the clinically apparent lesion.  An elliptical shape was then drawn on the skin incorporating the lesion and margins.  Incisions were then made along these lines to the appropriate tissue plane and the lesion was extirpated.
Advancement Flap (Double) Text: The defect edges were debeveled with a #15 scalpel blade.  Given the location of the defect and the proximity to free margins a double advancement flap was deemed most appropriate.  Using a sterile surgical marker, the appropriate advancement flaps were drawn incorporating the defect and placing the expected incisions within the relaxed skin tension lines where possible.    The area thus outlined was incised deep to adipose tissue with a #15 scalpel blade.  The skin margins were undermined to an appropriate distance in all directions utilizing iris scissors.
Post-Care Instructions: I reviewed with the patient in detail post-care instructions. Patient is not to engage in any heavy lifting, exercise, or swimming for the next 14 days. Should the patient develop any fevers, chills, bleeding, severe pain patient will contact the office immediately.
Xenograft Text: The defect edges were debeveled with a #15 scalpel blade.  Given the location of the defect, shape of the defect and the proximity to free margins a xenograft was deemed most appropriate.  The graft was then trimmed to fit the size of the defect.  The graft was then placed in the primary defect and oriented appropriately.
Complex Repair And Split-Thickness Skin Graft Text: The defect edges were debeveled with a #15 scalpel blade.  The primary defect was closed partially with a complex linear closure.  Given the location of the defect, shape of the defect and the proximity to free margins a split thickness skin graft was deemed most appropriate to repair the remaining defect.  The graft was trimmed to fit the size of the remaining defect.  The graft was then placed in the primary defect, oriented appropriately, and sutured into place.
Complex Repair And Dermal Autograft Text: The defect edges were debeveled with a #15 scalpel blade.  The primary defect was closed partially with a complex linear closure.  Given the location of the defect, shape of the defect and the proximity to free margins an dermal autograft was deemed most appropriate to repair the remaining defect.  The graft was trimmed to fit the size of the remaining defect.  The graft was then placed in the primary defect, oriented appropriately, and sutured into place.
Pre-Excision Curettage Text (Leave Blank If You Do Not Want): Prior to drawing the surgical margin the visible lesion was removed with electrodesiccation and curettage to clearly define the lesion size.
Partial Purse String (Simple) Text: Given the location of the defect and the characteristics of the surrounding skin a simple purse string closure was deemed most appropriate.  Undermining was performed circumferentially around the surgical defect.  A purse string suture was then placed and tightened. Wound tension of the circular defect prevented complete closure of the wound.
Complex Repair And W Plasty Text: The defect edges were debeveled with a #15 scalpel blade.  The primary defect was closed partially with a complex linear closure.  Given the location of the remaining defect, shape of the defect and the proximity to free margins a W plasty was deemed most appropriate for complete closure of the defect.  Using a sterile surgical marker, an appropriate advancement flap was drawn incorporating the defect and placing the expected incisions within the relaxed skin tension lines where possible.    The area thus outlined was incised deep to adipose tissue with a #15 scalpel blade.  The skin margins were undermined to an appropriate distance in all directions utilizing iris scissors.
Epidermal Sutures: 4-0 Nylon
O-T Plasty Text: The defect edges were debeveled with a #15 scalpel blade.  Given the location of the defect, shape of the defect and the proximity to free margins an O-T plasty was deemed most appropriate.  Using a sterile surgical marker, an appropriate O-T plasty was drawn incorporating the defect and placing the expected incisions within the relaxed skin tension lines where possible.    The area thus outlined was incised deep to adipose tissue with a #15 scalpel blade.  The skin margins were undermined to an appropriate distance in all directions utilizing iris scissors.
Interpolation Flap Text: A decision was made to reconstruct the defect utilizing an interpolation axial flap and a staged reconstruction.  A telfa template was made of the defect.  This telfa template was then used to outline the interpolation flap.  The donor area for the pedicle flap was then injected with anesthesia.  The flap was excised through the skin and subcutaneous tissue down to the layer of the underlying musculature.  The interpolation flap was carefully excised within this deep plane to maintain its blood supply.  The edges of the donor site were undermined.   The donor site was closed in a primary fashion.  The pedicle was then rotated into position and sutured.  Once the tube was sutured into place, adequate blood supply was confirmed with blanching and refill.  The pedicle was then wrapped with xeroform gauze and dressed appropriately with a telfa and gauze bandage to ensure continued blood supply and protect the attached pedicle.
Posterior Auricular Interpolation Flap Text: A decision was made to reconstruct the defect utilizing an interpolation axial flap and a staged reconstruction.  A telfa template was made of the defect.  This telfa template was then used to outline the posterior auricular interpolation flap.  The donor area for the pedicle flap was then injected with anesthesia.  The flap was excised through the skin and subcutaneous tissue down to the layer of the underlying musculature.  The pedicle flap was carefully excised within this deep plane to maintain its blood supply.  The edges of the donor site were undermined.   The donor site was closed in a primary fashion.  The pedicle was then rotated into position and sutured.  Once the tube was sutured into place, adequate blood supply was confirmed with blanching and refill.  The pedicle was then wrapped with xeroform gauze and dressed appropriately with a telfa and gauze bandage to ensure continued blood supply and protect the attached pedicle.
Curvilinear Excision Additional Text (Leave Blank If You Do Not Want): The margin was drawn around the clinically apparent lesion.  A curvilinear shape was then drawn on the skin incorporating the lesion and margins.  Incisions were then made along these lines to the appropriate tissue plane and the lesion was extirpated.
Hemostasis: Electrocautery
Size Of Margin In Cm: 0.3
Advancement-Rotation Flap Text: The defect edges were debeveled with a #15 scalpel blade.  Given the location of the defect, shape of the defect and the proximity to free margins an advancement-rotation flap was deemed most appropriate.  Using a sterile surgical marker, an appropriate flap was drawn incorporating the defect and placing the expected incisions within the relaxed skin tension lines where possible. The area thus outlined was incised deep to adipose tissue with a #15 scalpel blade.  The skin margins were undermined to an appropriate distance in all directions utilizing iris scissors.
Excision Depth: adipose tissue
Detail Level: Detailed
Previous Accession (Optional): [id number]

## 2018-03-27 ENCOUNTER — APPOINTMENT (RX ONLY)
Dept: URBAN - METROPOLITAN AREA CLINIC 4 | Facility: CLINIC | Age: 78
Setting detail: DERMATOLOGY
End: 2018-03-27

## 2018-03-27 DIAGNOSIS — Z48.02 ENCOUNTER FOR REMOVAL OF SUTURES: ICD-10-CM

## 2018-03-27 PROCEDURE — ? SUTURE REMOVAL (GLOBAL PERIOD)

## 2018-03-27 PROCEDURE — 99024 POSTOP FOLLOW-UP VISIT: CPT

## 2018-03-27 ASSESSMENT — LOCATION SIMPLE DESCRIPTION DERM: LOCATION SIMPLE: RIGHT PRETIBIAL REGION

## 2018-03-27 ASSESSMENT — LOCATION ZONE DERM: LOCATION ZONE: LEG

## 2018-03-27 ASSESSMENT — LOCATION DETAILED DESCRIPTION DERM: LOCATION DETAILED: RIGHT MEDIAL PROXIMAL PRETIBIAL REGION

## 2018-03-27 NOTE — PROCEDURE: SUTURE REMOVAL (GLOBAL PERIOD)
Detail Level: Detailed
Add 90480 Cpt? (Important Note: In 2017 The Use Of 54194 Is Being Tracked By Cms To Determine Future Global Period Reimbursement For Global Periods): yes

## 2018-04-19 ENCOUNTER — OUTPATIENT INFUSION SERVICES (OUTPATIENT)
Dept: ONCOLOGY | Facility: MEDICAL CENTER | Age: 78
End: 2018-04-19
Attending: FAMILY MEDICINE
Payer: MEDICARE

## 2018-04-19 VITALS
RESPIRATION RATE: 18 BRPM | WEIGHT: 101.19 LBS | OXYGEN SATURATION: 94 % | HEART RATE: 60 BPM | TEMPERATURE: 97.5 F | BODY MASS INDEX: 19.11 KG/M2 | DIASTOLIC BLOOD PRESSURE: 82 MMHG | SYSTOLIC BLOOD PRESSURE: 135 MMHG | HEIGHT: 61 IN

## 2018-04-19 LAB
CA-I BLD ISE-SCNC: 1.16 MMOL/L (ref 1.1–1.3)
CREAT BLD-MCNC: 0.8 MG/DL (ref 0.5–1.4)

## 2018-04-19 PROCEDURE — 96401 CHEMO ANTI-NEOPL SQ/IM: CPT

## 2018-04-19 PROCEDURE — 700111 HCHG RX REV CODE 636 W/ 250 OVERRIDE (IP): Mod: JG | Performed by: FAMILY MEDICINE

## 2018-04-19 PROCEDURE — 82565 ASSAY OF CREATININE: CPT

## 2018-04-19 PROCEDURE — 82330 ASSAY OF CALCIUM: CPT

## 2018-04-19 RX ORDER — LEVOTHYROXINE SODIUM 0.05 MG/1
50 TABLET ORAL
COMMUNITY
End: 2019-06-23

## 2018-04-19 RX ORDER — IBUPROFEN 200 MG
500 CAPSULE ORAL DAILY
COMMUNITY
End: 2019-06-23

## 2018-04-19 RX ADMIN — DENOSUMAB 60 MG: 60 INJECTION SUBCUTANEOUS at 14:27

## 2018-04-19 ASSESSMENT — PAIN SCALES - GENERAL: PAINLEVEL: NO PAIN

## 2018-04-19 NOTE — PROGRESS NOTES
Patient arrived to clinic for Prolia.  Confirmed she is taking Vitamin D and Calcium supplements daily and denies any recent or future invasive dental work.  Blood drawn from right a/c.  Ca 1.16 and Cr 0.8.  Prolia administered in left arm, band aid.  Patient tolerated well.  Patient ambulated out of clinic in no apparent distress.

## 2018-04-19 NOTE — PROGRESS NOTES
Pharmacy Prolia Note  Labs 4/19/18  Ionized calcium = 1.16  Cr = 0.8 est CrCl~ 44 ml/min  NO prior doses at [facility].  Jazmin Castrejon, PharmD

## 2018-04-19 NOTE — LETTER
Infusion Services   [address] [place] [address]  [place] [address]  Phone: [phone]  Fax: [phone]              Dear Dr. Gonzalez,    Your patient, Ruby Buckner (: 1940), was scheduled at [facility].  Ruby's encounter diagnosis is:  No diagnosis found.  She arrived for her appointment, and  the scheduled treatment was   given. These medications were administered to the patient: We administered denosumab..  Ruby tolerated treatment.. In addition, the following labs were drawn    Recent Results (from the past 24 hour(s))   ISTAT CREATININE    Collection Time: 18  2:17 PM   Result Value Ref Range    Istat Creatinine 0.8 0.5 - 1.4 mg/dL   ISTAT IONIZED CA    Collection Time: 18  2:17 PM   Result Value Ref Range    Istat Ionized Calcium 1.16 1.10 - 1.30 mmol/L                For more information, you may review the nurse's progress notes in chart review under the notes section.       Sincerely,  Luma Irwin R.N.

## 2018-10-02 ENCOUNTER — APPOINTMENT (RX ONLY)
Dept: URBAN - METROPOLITAN AREA CLINIC 4 | Facility: CLINIC | Age: 78
Setting detail: DERMATOLOGY
End: 2018-10-02

## 2018-10-02 DIAGNOSIS — Z85.828 PERSONAL HISTORY OF OTHER MALIGNANT NEOPLASM OF SKIN: ICD-10-CM

## 2018-10-02 PROCEDURE — ? COUNSELING

## 2018-10-02 PROCEDURE — 99212 OFFICE O/P EST SF 10 MIN: CPT

## 2018-10-02 ASSESSMENT — LOCATION SIMPLE DESCRIPTION DERM: LOCATION SIMPLE: RIGHT CALF

## 2018-10-02 ASSESSMENT — LOCATION DETAILED DESCRIPTION DERM: LOCATION DETAILED: RIGHT DISTAL MEDIAL CALF

## 2018-10-02 ASSESSMENT — LOCATION ZONE DERM: LOCATION ZONE: LEG

## 2018-12-11 ENCOUNTER — OUTPATIENT INFUSION SERVICES (OUTPATIENT)
Dept: ONCOLOGY | Facility: MEDICAL CENTER | Age: 78
End: 2018-12-11
Attending: FAMILY MEDICINE
Payer: MEDICARE

## 2018-12-11 VITALS
HEIGHT: 61 IN | OXYGEN SATURATION: 92 % | BODY MASS INDEX: 19.19 KG/M2 | RESPIRATION RATE: 18 BRPM | HEART RATE: 68 BPM | SYSTOLIC BLOOD PRESSURE: 133 MMHG | TEMPERATURE: 97.9 F | WEIGHT: 101.63 LBS | DIASTOLIC BLOOD PRESSURE: 79 MMHG

## 2018-12-11 LAB
CA-I BLD ISE-SCNC: 1.11 MMOL/L (ref 1.1–1.3)
CREAT BLD-MCNC: 0.7 MG/DL (ref 0.5–1.4)

## 2018-12-11 PROCEDURE — 96401 CHEMO ANTI-NEOPL SQ/IM: CPT

## 2018-12-11 PROCEDURE — 82565 ASSAY OF CREATININE: CPT

## 2018-12-11 PROCEDURE — 700111 HCHG RX REV CODE 636 W/ 250 OVERRIDE (IP): Mod: JG | Performed by: FAMILY MEDICINE

## 2018-12-11 PROCEDURE — 82330 ASSAY OF CALCIUM: CPT

## 2018-12-11 RX ADMIN — DENOSUMAB 60 MG: 60 INJECTION SUBCUTANEOUS at 15:31

## 2018-12-11 ASSESSMENT — PAIN SCALES - GENERAL
PAINLEVEL_OUTOF10: 0
PAINLEVEL: NO PAIN

## 2018-12-11 NOTE — LETTER
Infusion Services   [address] [place] [address]  [place] [address]  Phone: [phone]  Fax: [phone]              Dear Dr. Gonzalez,    Your patient, Ruby Buckner (: 1940), was scheduled at [facility].  Ruby's encounter diagnosis is:  No diagnosis found.  She arrived for her appointment, and  the scheduled treatment was   given. These medications were administered to the patient: We administered denosumab..  Ruby tolerated treatment. In addition, the following labs were drawn    Recent Results (from the past 24 hour(s))   ISTAT CREATININE    Collection Time: 18  3:22 PM   Result Value Ref Range    Istat Creatinine 0.7 0.5 - 1.4 mg/dL   ISTAT IONIZED CA    Collection Time: 18  3:22 PM   Result Value Ref Range    Istat Ionized Calcium 1.11 1.10 - 1.30 mmol/L            Her next appointment is rescheduled for 6 months from now.    For more information, you may review the nurse's progress notes in chart review under the notes section.       Sincerely,  Evette Peterson R.N.

## 2018-12-11 NOTE — PROGRESS NOTES
Pharmacy Prolia Note    Ionized calcium = 1.11  Cr = 0.7   est CrCl~ 50ml/min    Previous treatment = 4/19/18  BELKIS Torres Pharm.D.

## 2018-12-12 NOTE — PROGRESS NOTES
Ruby into Infusion Services for a Prolia injection for osteoporosis.  Pt denied having any new complaints, acute infections, or dental procedures in the last month or scheduled for the next month. 23G butterfly needle used to draw blood from left AC, bleeding controlled with gauze and coban after. Ionized calcium/creatinine tested, WNL, pharmacist notified that Pt within parameters to treat.  Ruby aware to continue taking vitamin D and calcium supplements. Prolia injection given in the back of left arm SQ. Pt tolerated well, adhesive bandage applied to injection site. Next appointment scheduled. Discharged to self care; no apparent distress noted.

## 2018-12-25 ENCOUNTER — APPOINTMENT (OUTPATIENT)
Dept: RADIOLOGY | Facility: MEDICAL CENTER | Age: 78
End: 2018-12-25
Attending: EMERGENCY MEDICINE
Payer: MEDICARE

## 2018-12-25 ENCOUNTER — HOSPITAL ENCOUNTER (EMERGENCY)
Facility: MEDICAL CENTER | Age: 78
End: 2018-12-25
Attending: EMERGENCY MEDICINE
Payer: MEDICARE

## 2018-12-25 VITALS
RESPIRATION RATE: 18 BRPM | TEMPERATURE: 97.8 F | WEIGHT: 102.73 LBS | DIASTOLIC BLOOD PRESSURE: 84 MMHG | BODY MASS INDEX: 19.4 KG/M2 | HEART RATE: 70 BPM | SYSTOLIC BLOOD PRESSURE: 124 MMHG | HEIGHT: 61 IN | OXYGEN SATURATION: 93 %

## 2018-12-25 DIAGNOSIS — S09.90XA CLOSED HEAD INJURY, INITIAL ENCOUNTER: ICD-10-CM

## 2018-12-25 DIAGNOSIS — S01.81XA FACIAL LACERATION, INITIAL ENCOUNTER: ICD-10-CM

## 2018-12-25 PROCEDURE — 303485 HCHG DRESSING MEDIUM

## 2018-12-25 PROCEDURE — 304217 HCHG IRRIGATION SYSTEM

## 2018-12-25 PROCEDURE — 303747 HCHG EXTRA SUTURE

## 2018-12-25 PROCEDURE — 99284 EMERGENCY DEPT VISIT MOD MDM: CPT

## 2018-12-25 PROCEDURE — 70450 CT HEAD/BRAIN W/O DYE: CPT

## 2018-12-25 PROCEDURE — 305308 HCHG STAPLER,SKIN,DISP.

## 2018-12-25 PROCEDURE — 304999 HCHG REPAIR-SIMPLE/INTERMED LEVEL 1

## 2018-12-25 ASSESSMENT — ENCOUNTER SYMPTOMS
BLURRED VISION: 0
DIZZINESS: 0
VOMITING: 0
NECK PAIN: 0
HEADACHES: 0
ABDOMINAL PAIN: 0
ROS SKIN COMMENTS: POSITIVE FOR LACERATION
WEAKNESS: 0
BACK PAIN: 0
SHORTNESS OF BREATH: 0
NAUSEA: 0

## 2018-12-25 ASSESSMENT — PAIN SCALES - GENERAL: PAINLEVEL_OUTOF10: 4

## 2018-12-26 NOTE — ED PROVIDER NOTES
"ED Provider Note    Primary care provider: Yelena Gonzalez M.D.  Means of arrival: private vehicle  History obtained from: patient  History limited by: none    CHIEF COMPLAINT  Chief Complaint   Patient presents with   • T-5000 GLF       HPI  Ruby Buckner is a 78 y.o. female who presents to the Emergency Department for ground-level fall.  Patient reports that she tripped and fell and landed on her head.  She sustained lacerations to her right lateral forehead and right posterior scalp.  She denies any loss of consciousness.  She is not on blood thinners.  Patient is having mild burning pain localized to the site of the lacerations.  She denies headache, blurred vision, nausea, vomiting, neck pain, and back pain.  She denies any other injuries.    REVIEW OF SYSTEMS  Review of Systems   HENT: Negative for hearing loss.    Eyes: Negative for blurred vision.   Respiratory: Negative for shortness of breath.    Cardiovascular: Negative for chest pain.   Gastrointestinal: Negative for abdominal pain, nausea and vomiting.   Musculoskeletal: Negative for back pain and neck pain.   Skin:        Positive for laceration   Neurological: Negative for dizziness, weakness and headaches.   All other systems reviewed and are negative.      PAST MEDICAL HISTORY   Hypothyroidism    SURGICAL HISTORY  patient denies any surgical history    SOCIAL HISTORY  Social History   Substance Use Topics   • Smoking status: Never Smoker   • Smokeless tobacco: Never Used   • Alcohol use No      History   Drug Use No       FAMILY HISTORY  History reviewed. No pertinent family history.    CURRENT MEDICATIONS  Home Medications    Levothyroxine         ALLERGIES  No Known Allergies    PHYSICAL EXAM  VITAL SIGNS: /84   Pulse 67   Temp 35.9 °C (96.7 °F) (Temporal)   Resp 18   Ht 1.549 m (5' 1\")   Wt 46.6 kg (102 lb 11.8 oz)   SpO2 93%   BMI 19.41 kg/m²   Vitals reviewed by myself.  Physical Exam  Nursing note and vitals " reviewed.  Constitutional: Well-developed and well-nourished. No distress.   HENT: Head is normocephalicOropharynx is clear and moist without exudate or erythema.  Patient is noted to have 2 cm laceration to the right lateral forehead.  She has a 1 centimeter laceration to the posterior scalp.  No active bleeding from either wound  Eyes: Pupils are equal, round, and reactive to light. No horizontal or vertical nystagmus. Conjunctiva are normal.   Neck: No midline C-spine tenderness, patient has full range of motion of her neck  Cardiovascular: Normal rate and regular rhythm. No murmur heard. Normal radial pulses.  Pulmonary/Chest: Breath sounds normal. No wheezes or rales.   Abdominal: Soft and non-tender. No distention.    Musculoskeletal: Extremities exhibit normal range of motion without edema or tenderness. Patient ambulates with a normal narrow-based steady gait.  No midline spinal tenderness  Neurological: Awake, alert and oriented to person, place, and time. No focal deficits noted. Cranial nerves II - XII intact. No pronator drift.  No dysmetria on cerebellar testing. Normal speech and language. Normal strength and sensation in bilateral upper and lower extremities.   Skin: Skin is warm and dry. No rash.   Psychiatric: Normal mood and affect. Appropriate for clinical situation.      DIAGNOSTIC STUDIES /  LABS    RADIOLOGY  CT-HEAD W/O   Final Result      1.  Cerebral atrophy.      2.  White matter lucencies most consistent with small vessel ischemic change versus demyelination or gliosis.      3.  Otherwise, Head CT without contrast with no acute findings. No evidence of acute cerebral infarction, hemorrhage or mass lesion.        The radiologist's interpretation of all radiological studies have been reviewed by me.    PROCEDURE  Laceration Repair Procedure Note #1  Indication: Laceration to right forehead  Procedure: The patient was placed in the appropriate position and anesthesia around the laceration was  obtained by infiltration using 1% Lidocaine with epinephrine. The area was then irrigated with normal saline. The laceration was closed with 5-0 Ethilon using interrupted sutures. The wound area was then dressed with bacitracin and a bandage.    Total repaired wound length: 2 cm.   Other Items: Suture count: 5  The patient tolerated the procedure well.  Complications: None      Laceration Repair Procedure Note#2  Indication: Laceration to posterior scalp  Procedure: The patient was placed in the appropriate position and anesthesia around the laceration was obtained by infiltration using 1% Lidocaine with epinephrine. The area was then irrigated with normal saline. The laceration was closed with staple.   Total repaired wound length: 1 cm.   Other Items: Staple count: 1  The patient tolerated the procedure well.  Complications: None      COURSE & MEDICAL DECISION MAKING  Nursing notes, VS, PMSFHx reviewed in chart.    Patient is a 78-year-old female who comes in for mechanical ground-level fall.  Differential diagnosis includes closed head injury, concussion, intracranial hemorrhage, laceration, abrasion.  Diagnostic workup includes CT of the head.    Patient's initial vitals are within normal limits.  She is neurologically intact on physical exam.  Imaging returns and demonstrates no acute intracranial hemorrhage.  Patient's lacerations were thoroughly irrigated and repaired, please see procedure note above for details.  Patient was advised on wound care and advised that stable on posterior scalp should be removed in 10 days, and facial sutures should be removed in 3-5 days.  Patient is agreeable to this plan.  She is then given strict return precautions and discharged home in stable condition.      FINAL IMPRESSION  1. Closed head injury, initial encounter    2. Facial laceration, initial encounter

## 2018-12-26 NOTE — DISCHARGE INSTRUCTIONS
The staple in the back of your head needs to be removed in 10 days, stitches on your face should be removed in 3-5 days

## 2018-12-30 ENCOUNTER — APPOINTMENT (OUTPATIENT)
Dept: RADIOLOGY | Facility: IMAGING CENTER | Age: 78
End: 2018-12-30
Attending: PHYSICIAN ASSISTANT
Payer: MEDICARE

## 2018-12-30 ENCOUNTER — OFFICE VISIT (OUTPATIENT)
Dept: URGENT CARE | Facility: CLINIC | Age: 78
End: 2018-12-30
Payer: MEDICARE

## 2018-12-30 VITALS
HEIGHT: 61 IN | BODY MASS INDEX: 19.26 KG/M2 | OXYGEN SATURATION: 97 % | WEIGHT: 102 LBS | HEART RATE: 66 BPM | DIASTOLIC BLOOD PRESSURE: 76 MMHG | RESPIRATION RATE: 14 BRPM | TEMPERATURE: 97.8 F | SYSTOLIC BLOOD PRESSURE: 130 MMHG

## 2018-12-30 DIAGNOSIS — Z48.02 VISIT FOR SUTURE REMOVAL: ICD-10-CM

## 2018-12-30 DIAGNOSIS — M54.6 ACUTE RIGHT-SIDED THORACIC BACK PAIN: ICD-10-CM

## 2018-12-30 DIAGNOSIS — R07.81 RIB PAIN ON RIGHT SIDE: ICD-10-CM

## 2018-12-30 PROCEDURE — 99204 OFFICE O/P NEW MOD 45 MIN: CPT | Performed by: PHYSICIAN ASSISTANT

## 2018-12-30 PROCEDURE — 71101 X-RAY EXAM UNILAT RIBS/CHEST: CPT | Mod: 26,RT | Performed by: PHYSICIAN ASSISTANT

## 2018-12-30 ASSESSMENT — ENCOUNTER SYMPTOMS
VOMITING: 0
FEVER: 0
VISUAL CHANGE: 0
CHANGE IN BOWEL HABIT: 0
COUGH: 1

## 2018-12-30 NOTE — PROGRESS NOTES
Subjective:      Ruby Buckner is a 78 y.o. female who presents with Rib Injury (X1 day ribs started hurting after with productive cough)          Patient is a pleasant 78-year-old female who presents to urgent care with concern regarding right-sided mid back and rib pain for the last 1-2 days with associated cough.  Patient does report she recently had a subsequent fall which required evaluation in the emergency room with 2 scalp lacerations one to the right side of the face and one on her posterior scalp.  Laceration repair was 5 days ago of which patient also had normal head CT at that time.  Patient does not believe that she had her back during that time however does report that she fell backwards and was found on the floor on her back.  Patient does report slight itching to the laceration on the right side of her face however denies any drainage.  Patient also denies any shortness of breath, wheezing.  Her cough is dry at this time.  Denies any drainage or crusting from her lac. Sites.     Rib Injury   This is a new problem. The current episode started yesterday. The problem occurs constantly. The problem has been unchanged. Associated symptoms include coughing. Pertinent negatives include no change in bowel habit, chills, congestion, fever, headaches, rash, sore throat, visual change or vomiting. Exacerbated by:  coughing or sneezing. She has tried nothing for the symptoms.       Review of Systems   Constitutional: Positive for malaise/fatigue. Negative for chills and fever.   HENT: Negative for congestion and sore throat.    Eyes: Negative for discharge and redness.   Respiratory: Positive for cough. Negative for shortness of breath and wheezing.         Pos for back/rib pain.      Cardiovascular: Negative for leg swelling.   Gastrointestinal: Negative for change in bowel habit, diarrhea and vomiting.   Musculoskeletal: Positive for back pain and falls.   Skin: Negative for itching and rash.   Neurological:  "Negative for headaches.   All other systems reviewed and are negative.         Objective:     /76 (BP Location: Left arm, Patient Position: Sitting, BP Cuff Size: Adult)   Pulse 66   Temp 36.6 °C (97.8 °F) (Temporal)   Resp 14   Ht 1.549 m (5' 1\")   Wt 46.3 kg (102 lb)   SpO2 97%   BMI 19.27 kg/m²    PMH:  has no past medical history on file.  MEDS:   Current Outpatient Prescriptions:   •  Denosumab (PROLIA SC), Inject  as instructed., Disp: , Rfl:   •  levothyroxine (SYNTHROID) 50 MCG Tab, Take 50 mcg by mouth Every morning on an empty stomach., Disp: , Rfl:   •  vitamin D (CHOLECALCIFEROL) 1000 UNIT Tab, Take 1,000 Units by mouth every day., Disp: , Rfl:   •  calcium carbonate (OS-GURDEEP 500) 1250 (500 Ca) MG Tab, Take 500 mg by mouth every day., Disp: , Rfl:   ALLERGIES: No Known Allergies  SURGHX: History reviewed. No pertinent surgical history.  SOCHX:  reports that she has never smoked. She has never used smokeless tobacco. She reports that she does not drink alcohol or use drugs.  FH: Family history was reviewed, no pertinent findings to report    Physical Exam   Constitutional: She is oriented to person, place, and time. She appears well-developed and well-nourished. No distress.   HENT:   Head:       Right Ear: External ear normal.   Left Ear: External ear normal.   Mouth/Throat: Oropharynx is clear and moist. No oropharyngeal exudate.   Well-healing laceration to the right lateral eyebrow with 5 sutures intact without evidence of surrounding erythema, drainage or tenderness.  5 sutures were removed without difficulty.  One staple intact to the posterior scalp without associated edema, drainage or tenderness.   Eyes: Pupils are equal, round, and reactive to light. Conjunctivae and EOM are normal.   Neck: Normal range of motion. Neck supple. No tracheal deviation present.   Cardiovascular: Normal rate and regular rhythm.    No murmur heard.  Pulmonary/Chest: Effort normal and breath sounds normal. " No respiratory distress.         She exhibits tenderness.   Musculoskeletal: Normal range of motion. She exhibits no edema.   Neurological: She is alert and oriented to person, place, and time. Coordination normal.   Skin: Skin is warm. No rash noted.   Psychiatric: She has a normal mood and affect. Her behavior is normal. Judgment and thought content normal.   Vitals reviewed.         Xr ribs: 1.  There are no displaced rib fractures.  Assessment/Plan:     1. Rib pain on right side  - [id number] (WITH 1-VIEW CXR) RIGHT; Future    2. Acute right-sided thoracic back pain  - [id number] (WITH 1-VIEW CXR) RIGHT; Future    3. Visit for suture removal-5 sutures were removed without difficulty.  Patient is to wait 5 more days for the other staple removal.    Discussed the x-ray findings with patient and her mother at this time.  Discussed the complications of rib pain in the future and discussed breathing exercises to avoid atelectasis.  Over-the-counter pain meds encouraged, ice and heat rotation as discussed and return to clinic in 5 days for further wound recheck.  Patient given precautionary s/sx that mandate immediate follow up and evaluation in the ED. Advised of risks of not doing so.    DDX, Supportive care, and indications for immediate follow-up discussed with patient.    Instructed to return to clinic or nearest emergency department if we are not available for any change in condition, further concerns, or worsening of symptoms.    The patient demonstrated a good understanding and agreed with the treatment plan.  Please note that this dictation was created using voice recognition software. I have made every reasonable attempt to correct obvious errors, but I expect that there are errors of grammar and possibly content that I did not discover before finalizing the note.

## 2019-01-01 ASSESSMENT — ENCOUNTER SYMPTOMS
HEADACHES: 0
EYE REDNESS: 0
DIARRHEA: 0
SHORTNESS OF BREATH: 0
EYE DISCHARGE: 0
BACK PAIN: 1
WHEEZING: 0
FALLS: 1
CHILLS: 0
SORE THROAT: 0

## 2019-01-03 ENCOUNTER — OFFICE VISIT (OUTPATIENT)
Dept: URGENT CARE | Facility: CLINIC | Age: 79
End: 2019-01-03
Payer: MEDICARE

## 2019-01-03 VITALS
OXYGEN SATURATION: 94 % | BODY MASS INDEX: 19.26 KG/M2 | HEART RATE: 72 BPM | WEIGHT: 102 LBS | TEMPERATURE: 97.9 F | HEIGHT: 61 IN | DIASTOLIC BLOOD PRESSURE: 76 MMHG | SYSTOLIC BLOOD PRESSURE: 100 MMHG | RESPIRATION RATE: 12 BRPM

## 2019-01-03 DIAGNOSIS — Z48.02 ENCOUNTER FOR STAPLE REMOVAL: ICD-10-CM

## 2019-01-03 PROCEDURE — 99024 POSTOP FOLLOW-UP VISIT: CPT | Performed by: NURSE PRACTITIONER

## 2019-01-03 ASSESSMENT — ENCOUNTER SYMPTOMS
PALPITATIONS: 0
BLURRED VISION: 0
WHEEZING: 0
NAUSEA: 0
DOUBLE VISION: 0
FEVER: 0
CHILLS: 0
PHOTOPHOBIA: 0
CONSTIPATION: 0
ROS SKIN COMMENTS: STAPLE REMOVAL
DIARRHEA: 0
SHORTNESS OF BREATH: 0
ABDOMINAL PAIN: 0
VOMITING: 0

## 2019-01-03 NOTE — PROGRESS NOTES
"Subjective:   Ruby Buckner is a 78 y.o. female who presents for Suture / Staple Removal (removal of 1 staple)        HPI   Patient presents for staple removal to the back of scalp. Post   Denies headaches, fever, nausea, vomiting, diarrhea. States rib pain is much improved. No current complaints.      Review of Systems   Constitutional: Negative for chills, fever and malaise/fatigue.   Eyes: Negative for blurred vision, double vision and photophobia.   Respiratory: Negative for shortness of breath and wheezing.    Cardiovascular: Negative for chest pain and palpitations.   Gastrointestinal: Negative for abdominal pain, constipation, diarrhea, nausea and vomiting.   Skin:        Staple removal       PMH:  has no past medical history on file.  MEDS:   Current Outpatient Prescriptions:   •  Denosumab (PROLIA SC), Inject  as instructed., Disp: , Rfl:   •  levothyroxine (SYNTHROID) 50 MCG Tab, Take 50 mcg by mouth Every morning on an empty stomach., Disp: , Rfl:   •  vitamin D (CHOLECALCIFEROL) 1000 UNIT Tab, Take 1,000 Units by mouth every day., Disp: , Rfl:   •  calcium carbonate (OS-GURDEEP 500) 1250 (500 Ca) MG Tab, Take 500 mg by mouth every day., Disp: , Rfl:   ALLERGIES: No Known Allergies  SURGHX: History reviewed. No pertinent surgical history.  SOCHX:  reports that she has never smoked. She has never used smokeless tobacco. She reports that she does not drink alcohol or use drugs.  FH: Family history was reviewed, no pertinent findings to report     Objective:   /76 (BP Location: Left arm, Patient Position: Sitting, BP Cuff Size: Adult)   Pulse 72   Temp 36.6 °C (97.9 °F)   Resp 12   Ht 1.549 m (5' 0.98\")   Wt 46.3 kg (102 lb)   SpO2 94%   BMI 19.28 kg/m²   Physical Exam   Constitutional: She appears well-developed and well-nourished. No distress.   HENT:   Head: Normocephalic.   Cardiovascular: Normal rate, regular rhythm and normal heart sounds.    Pulmonary/Chest: Effort normal and breath sounds " normal. No respiratory distress. She has no decreased breath sounds. She has no wheezes.   Neurological: She is alert.   Skin: Skin is warm. No rash noted. She is not diaphoretic.        Small healing scabbed over laceration to back of head. No active bleeding or drainage noted. Appears to be well healing.    x1 staple to area in place.        Psychiatric: She has a normal mood and affect. Her behavior is normal. Judgment and thought content normal.         Assessment/Plan:   Assessment    1. Encounter for staple removal    Removed x1 staple in back of head. No bleeding or drainage noted.     Follow up PRN    Differential diagnosis, natural history, supportive care, and indications for immediate follow-up discussed.

## 2019-06-11 ENCOUNTER — OUTPATIENT INFUSION SERVICES (OUTPATIENT)
Dept: ONCOLOGY | Facility: MEDICAL CENTER | Age: 79
End: 2019-06-11
Attending: FAMILY MEDICINE
Payer: MEDICARE

## 2019-06-11 VITALS
HEART RATE: 60 BPM | WEIGHT: 101.85 LBS | OXYGEN SATURATION: 95 % | TEMPERATURE: 97.9 F | DIASTOLIC BLOOD PRESSURE: 72 MMHG | RESPIRATION RATE: 18 BRPM | BODY MASS INDEX: 20 KG/M2 | SYSTOLIC BLOOD PRESSURE: 130 MMHG | HEIGHT: 60 IN

## 2019-06-11 DIAGNOSIS — M81.0 OSTEOPOROSIS, UNSPECIFIED OSTEOPOROSIS TYPE, UNSPECIFIED PATHOLOGICAL FRACTURE PRESENCE: ICD-10-CM

## 2019-06-11 LAB
CA-I BLD ISE-SCNC: 1.06 MMOL/L (ref 1.1–1.3)
CREAT BLD-MCNC: 0.7 MG/DL (ref 0.5–1.4)

## 2019-06-11 PROCEDURE — 82330 ASSAY OF CALCIUM: CPT

## 2019-06-11 PROCEDURE — 36415 COLL VENOUS BLD VENIPUNCTURE: CPT

## 2019-06-11 PROCEDURE — 96372 THER/PROPH/DIAG INJ SC/IM: CPT

## 2019-06-11 PROCEDURE — 700111 HCHG RX REV CODE 636 W/ 250 OVERRIDE (IP): Mod: JG | Performed by: FAMILY MEDICINE

## 2019-06-11 PROCEDURE — 82565 ASSAY OF CREATININE: CPT

## 2019-06-11 RX ADMIN — DENOSUMAB 60 MG: 60 INJECTION SUBCUTANEOUS at 14:30

## 2019-06-11 NOTE — LETTER
Infusion [facility]   [address] [place] [id number]  Phone: [phone]  Fax: [phone]              Dear Dr. Gonzalez,    Your patient, Ruby Buckner (: 1940), was scheduled at [facility].  Ruby's encounter diagnosis is:  1. Osteoporosis, unspecified osteoporosis type, unspecified pathological fracture presence  ISTAT CREATININE    IONIZED CALCIUM    ISTAT CREATININE    IONIZED CALCIUM     She arrived for her appointment, and  the scheduled treatment was   given. These medications were administered to the patient: We administered denosumab..  Ruby tolerated treatment. In addition, the following labs were drawn    Recent Results (from the past 24 hour(s))   ISTAT CREATININE    Collection Time: 19  2:15 PM   Result Value Ref Range    Istat Creatinine 0.7 0.5 - 1.4 mg/dL   ISTAT IONIZED CA    Collection Time: 19  2:15 PM   Result Value Ref Range    Istat Ionized Calcium 1.06 (L) 1.10 - 1.30 mmol/L            Her next appointment is rescheduled for 19.    For more information, you may review the nurse's progress notes in chart review under the notes section.       Sincerely,  [facility]fusion [facility]

## 2019-06-11 NOTE — PROGRESS NOTES
Late entry for 14:30:  Prolia administered per MD orders to L back of arm via subcutaneous injection.  Pt tolerated injection well and without incident.  Band aid applied to injection site.  Pt left infusion services in no apparent distress after completion of treatment, ambulatory.  Pt to return in 6 months; scheduling notified to schedule for next appointment.

## 2019-06-11 NOTE — PROGRESS NOTES
Pt to infusion services ambulatory per self.  Pt here for scheduled Prolia injection.  Plan of care reviewed.  Pt verbalizes understanding.  Pt denies any s/sx of infection today.  Pt denies any recent or upcoming invasive dental procedures or oral surgery.  Pt reports taking minimum recommendations of calcium and vitamin d daily.  Lab drawn from L-AC using #25G BD needle.  Pt tolerated well.  Gauze and paper tape applied to venipuncture site.  Blood sample to lab for iSTAT calcium and creatinine.  Ionized calcium = 1.06 today.  Pt denies any s/sx of hypocalcemia today.  Discussed with pharmacy; okay for Prolia today.  Chart to pharmacy.

## 2019-06-11 NOTE — PROGRESS NOTES
Pharmacy Prolia Note    Ionized calcium = 1.06  Cr = 0.7   est CrCl~ 50ml/min    Asymptomatic and reports taking the recommended daily doses of calcium and vitamin D.    Previous treatment = 12/11/18  BELKIS Torres Pharm.D.

## 2019-06-23 ENCOUNTER — HOSPITAL ENCOUNTER (OUTPATIENT)
Facility: MEDICAL CENTER | Age: 79
End: 2019-06-23
Attending: PHYSICIAN ASSISTANT
Payer: MEDICARE

## 2019-06-23 ENCOUNTER — OFFICE VISIT (OUTPATIENT)
Dept: URGENT CARE | Facility: CLINIC | Age: 79
End: 2019-06-23
Payer: MEDICARE

## 2019-06-23 VITALS
DIASTOLIC BLOOD PRESSURE: 68 MMHG | HEART RATE: 72 BPM | TEMPERATURE: 98.2 F | HEIGHT: 62 IN | WEIGHT: 100 LBS | SYSTOLIC BLOOD PRESSURE: 124 MMHG | BODY MASS INDEX: 18.4 KG/M2 | RESPIRATION RATE: 12 BRPM | OXYGEN SATURATION: 97 %

## 2019-06-23 DIAGNOSIS — R31.9 HEMATURIA, UNSPECIFIED TYPE: ICD-10-CM

## 2019-06-23 DIAGNOSIS — S39.012A BACK STRAIN, INITIAL ENCOUNTER: Primary | ICD-10-CM

## 2019-06-23 LAB
APPEARANCE UR: CLEAR
BILIRUB UR STRIP-MCNC: NORMAL MG/DL
COLOR UR AUTO: YELLOW
GLUCOSE UR STRIP.AUTO-MCNC: NORMAL MG/DL
KETONES UR STRIP.AUTO-MCNC: 15 MG/DL
LEUKOCYTE ESTERASE UR QL STRIP.AUTO: NORMAL
NITRITE UR QL STRIP.AUTO: NORMAL
PH UR STRIP.AUTO: 7 [PH] (ref 5–8)
PROT UR QL STRIP: NORMAL MG/DL
RBC UR QL AUTO: NORMAL
SP GR UR STRIP.AUTO: 1.01
UROBILINOGEN UR STRIP-MCNC: 0.2 MG/DL

## 2019-06-23 PROCEDURE — 87086 URINE CULTURE/COLONY COUNT: CPT

## 2019-06-23 PROCEDURE — 99214 OFFICE O/P EST MOD 30 MIN: CPT | Performed by: PHYSICIAN ASSISTANT

## 2019-06-23 PROCEDURE — 87186 SC STD MICRODIL/AGAR DIL: CPT

## 2019-06-23 PROCEDURE — 87077 CULTURE AEROBIC IDENTIFY: CPT

## 2019-06-23 PROCEDURE — 81002 URINALYSIS NONAUTO W/O SCOPE: CPT | Performed by: PHYSICIAN ASSISTANT

## 2019-06-23 RX ORDER — ACETAMINOPHEN 325 MG/1
650 TABLET ORAL EVERY 4 HOURS PRN
COMMUNITY
End: 2023-02-13

## 2019-06-23 RX ORDER — IBUPROFEN 600 MG/1
600 TABLET ORAL EVERY 8 HOURS PRN
Qty: 15 TAB | Refills: 0 | Status: SHIPPED | OUTPATIENT
Start: 2019-06-23 | End: 2019-06-28

## 2019-06-23 RX ORDER — IBUPROFEN 200 MG
200 TABLET ORAL EVERY 6 HOURS PRN
COMMUNITY
End: 2019-06-23

## 2019-06-23 RX ORDER — LEVOTHYROXINE SODIUM 0.05 MG/1
50 TABLET ORAL
COMMUNITY
End: 2021-10-27

## 2019-06-23 ASSESSMENT — ENCOUNTER SYMPTOMS
TINGLING: 0
BOWEL INCONTINENCE: 0
ABDOMINAL PAIN: 0
HEADACHES: 1
PARESTHESIAS: 0
PERIANAL NUMBNESS: 0
BACK PAIN: 1
NAUSEA: 0
CHILLS: 0
COUGH: 0
CONSTIPATION: 0
FLANK PAIN: 0
PARESIS: 0
DIARRHEA: 0
SHORTNESS OF BREATH: 0
FEVER: 0
VOMITING: 0
NUMBNESS: 0

## 2019-06-23 NOTE — PATIENT INSTRUCTIONS
Back Exercises  Back exercises help treat and prevent back injuries. The goal is to increase your strength in your belly (abdominal) and back muscles. These exercises can also help with flexibility. Start these exercises when told by your doctor.  HOME CARE  Back exercises include:  Pelvic Tilt.  · Lie on your back with your knees bent. Tilt your pelvis until the lower part of your back is against the floor. Hold this position 5 to 10 sec. Repeat this exercise 5 to 10 times.  Knee to Chest.  · Pull 1 knee up against your chest and hold for 20 to 30 seconds. Repeat this with the other knee. This may be done with the other leg straight or bent, whichever feels better. Then, pull both knees up against your chest.  Sit-Ups or Curl-Ups.  · Bend your knees 90 degrees. Start with tilting your pelvis, and do a partial, slow sit-up. Only lift your upper half 30 to 45 degrees off the floor. Take at least 2 to 3 seonds for each sit-up. Do not do sit-ups with your knees out straight. If partial sit-ups are difficult, simply do the above but with only tightening your belly (abdominal) muscles and holding it as told.  Hip-Lift.  · Lie on your back with your knees flexed 90 degrees. Push down with your feet and shoulders as you raise your hips 2 inches off the floor. Hold for 10 seconds, repeat 5 to 10 times.  Back Arches.  · Lie on your stomach. Prop yourself up on bent elbows. Slowly press on your hands, causing an arch in your low back. Repeat 3 to 5 times.  Shoulder-Lifts.  · Lie face down with arms beside your body. Keep hips and belly pressed to floor as you slowly lift your head and shoulders off the floor.  Do not overdo your exercises. Be careful in the beginning. Exercises may cause you some mild back discomfort. If the pain lasts for more than 15 minutes, stop the exercises until you see your doctor. Improvement with exercise for back problems is slow.      This information is not intended to replace advice given to you  by your health care provider. Make sure you discuss any questions you have with your health care provider.     Document Released: 01/20/2012 Document Revised: 03/11/2013 Document Reviewed: 02/11/2016  [employer] Interactive Patient Education ©2016 [employer]      Stretching and range of motion exercises  These exercises warm up your muscles and joints and improve the movement and flexibility of your hip and pelvis. These exercises also help to relieve pain, numbness, and tingling.  Exercise A: Hip rotators  1. Lie on your back on a firm surface.  2. Pull your left / right knee toward your same shoulder with your left / right hand until your knee is pointing toward the ceiling. Hold your left / right ankle with your other hand.  3. Keeping your knee steady, gently pull your left / right ankle toward your other shoulder until you feel a stretch in your buttocks.  4. Hold this position for __________ seconds.  Repeat __________ times. Complete this stretch __________ times a day.  Exercise B: Hip extensors  1. Lie on your back on a firm surface. Both of your legs should be straight.  2. Pull your left / right knee to your chest. Hold your leg in this position by holding onto the back of your thigh or the front of your knee.  3. Hold this position for __________ seconds.  4. Slowly return to the starting position.  Repeat __________ times. Complete this stretch __________ times a day.  Strengthening exercises  These exercises build strength and endurance in your hip and thigh muscles. Endurance is the ability to use your muscles for a long time, even after they get tired.  Exercise C: Straight leg raises (hip abductors)  1. Lie on your side with your left / right leg in the top position. Lie so your head, shoulder, knee, and hip line up. Bend your bottom knee to help you balance.  2. Lift your top leg up 4-6 inches (10-15 cm), keeping your toes pointed straight ahead.  3. Hold this position for __________  seconds.  4. Slowly lower your leg to the starting position. Let your muscles relax completely.  Repeat __________ times. Complete this exercise__________ times a day.  Exercise D: Hip abductors and rotators, quadruped  1. Get on your hands and knees on a firm, lightly padded surface. Your hands should be directly below your shoulders, and your knees should be directly below your hips.  2. Lift your left / right knee out to the side. Keep your knee bent. Do not twist your body.  3. Hold this position for __________ seconds.  4. Slowly lower your leg.  Repeat __________ times. Complete this exercise__________ times a day.  Exercise E: Straight leg raises (hip extensors)  1. Lie on your abdomen on a bed or a firm surface with a pillow under your hips.  2. Squeeze your buttock muscles and lift your left / right thigh off the bed. Do not let your back arch.  3. Hold this position for __________ seconds.  4. Slowly return to the starting position. Let your muscles relax completely before doing another repetition.  Repeat __________ times. Complete this exercise__________ times a day.  This information is not intended to replace advice given to you by your health care provider. Make sure you discuss any questions you have with your health care provider.  Document Released: 12/18/2006 Document Revised: 08/22/2017 Document Reviewed: 11/29/2016  [employer]vier Interactive Patient Education © 2017 [employer]

## 2019-06-23 NOTE — PROGRESS NOTES
Subjective:   Ruby Buckner is a 78 y.o. female who presents for Back Pain (x 5 days. Low back pain, more to the back R side. Pt. said she tweaked her back on Monday getting out of the car. )        Back Pain   This is a new problem. Episode onset: 5 days. The problem occurs constantly. The problem is unchanged. Pain location: R thoracic and R lumbar  The pain is moderate. The symptoms are aggravated by lying down. Pertinent negatives include no abdominal pain, bladder incontinence, bowel incontinence, dysuria, fever, numbness, paresis, paresthesias, perianal numbness or tingling. She has tried heat, ice and analgesics for the symptoms.     No hx of kidney stone or infection.     Review of Systems   Constitutional: Negative for chills, fever and malaise/fatigue.   Respiratory: Negative for cough and shortness of breath.    Gastrointestinal: Negative for abdominal pain, bowel incontinence, constipation, diarrhea, nausea and vomiting.   Genitourinary: Negative for bladder incontinence, dysuria, flank pain, frequency, hematuria and urgency.   Musculoskeletal: Positive for back pain (R flank).   Neurological: Negative for tingling, numbness and paresthesias.   All other systems reviewed and are negative.      PMH:  has no past medical history on file.  MEDS:   Current Outpatient Prescriptions:   •  levothyroxine (SYNTHROID) 50 MCG Tab, Take 50 mcg by mouth Every morning on an empty stomach., Disp: , Rfl:   •  acetaminophen (TYLENOL) 325 MG Tab, Take 650 mg by mouth every four hours as needed., Disp: , Rfl:   •  ibuprofen (MOTRIN) 600 MG Tab, Take 1 Tab by mouth every 8 hours as needed for up to 5 days., Disp: 15 Tab, Rfl: 0  ALLERGIES: No Known Allergies  SURGHX: History reviewed. No pertinent surgical history.  SOCHX:  reports that she has never smoked. She has never used smokeless tobacco. She reports that she does not drink alcohol or use drugs.  History reviewed. No pertinent family history.     Objective:   /68  "  Pulse 72   Temp 36.8 °C (98.2 °F) (Temporal)   Resp 12   Ht 1.575 m (5' 2\")   Wt 45.4 kg (100 lb)   SpO2 97%   BMI 18.29 kg/m²     Physical Exam   Constitutional: She is oriented to person, place, and time. She appears well-developed and well-nourished. No distress.   HENT:   Head: Normocephalic and atraumatic.   Nose: Nose normal.   Eyes: Pupils are equal, round, and reactive to light. Conjunctivae are normal.   Neck: Normal range of motion. Neck supple. No tracheal deviation present.   Cardiovascular: Normal rate and regular rhythm.    Pulmonary/Chest: Effort normal and breath sounds normal.   Abdominal: There is no CVA tenderness.   Musculoskeletal:        Left hip: She exhibits normal range of motion, normal strength, no tenderness and no bony tenderness.        Lumbar back: She exhibits tenderness and spasm. She exhibits normal range of motion and no bony tenderness.        Back:    ROM, strength, tone intact bilateral lower extremity.  DTRs 2/4 bilaterally.  Distal N/V intact.  Negative SLR.   Neurological: She is alert and oriented to person, place, and time.   Skin: Skin is warm and dry. Capillary refill takes less than 2 seconds.   Psychiatric: She has a normal mood and affect. Her behavior is normal.   Vitals reviewed.    UA: trace blood         Assessment/Plan:     1. Back strain, initial encounter  ibuprofen (MOTRIN) 600 MG Tab    POCT Urinalysis   2. Hematuria, unspecified type  URINE CULTURE(NEW)     Supportive care reviewed.  Educational handout on back exercises provided.    Follow-up with primary care provider within 7-10 days, consider PT or repeat UA.  If symptoms worsen or persist patient can return to clinic for reevaluation.  Red flags and strict emergency room precautions discussed. All side effects of medication discussed including allergic response, GI upset, tendon injury, etc. Patient verbalized understanding of information.    Please note that this dictation was created using " voice recognition software. I have made every reasonable attempt to correct obvious errors, but I expect that there are errors of grammar and possibly content that I did not discover before finalizing the note.

## 2019-06-23 NOTE — PROGRESS NOTES
"Subjective:   Ruby Buckner is a 78 y.o. female who presents for No chief complaint on file.        Migraine    This is a new problem. Episode onset: 5 days. The problem has been waxing and waning.     Review of Systems   Neurological: Positive for headaches.       PMH:  has no past medical history on file.  MEDS:   Current Outpatient Prescriptions:   •  ibuprofen (MOTRIN) 200 MG Tab, Take 200 mg by mouth every 6 hours as needed., Disp: , Rfl:   ALLERGIES: No Known Allergies  SURGHX: History reviewed. No pertinent surgical history.  SOCHX:  reports that she has never smoked. She has never used smokeless tobacco. She reports that she does not drink alcohol or use drugs.  History reviewed. No pertinent family history.     Objective:   Ht 1.575 m (5' 2\")   BMI 18.63 kg/m²     Physical Exam      Assessment/Plan:   No diagnosis found.    Follow-up with primary care provider within 7-10 days.  If symptoms worsen or persist patient can return to clinic for reevaluation.  Red flags and emergency room precautions discussed. All side effects of medication discussed including allergic response, GI upset, tendon injury, etc. Patient verbalized understanding of information.    Please note that this dictation was created using voice recognition software. I have made every reasonable attempt to correct obvious errors, but I expect that there are errors of grammar and possibly content that I did not discover before finalizing the note.         "

## 2019-06-24 DIAGNOSIS — R31.9 HEMATURIA, UNSPECIFIED TYPE: ICD-10-CM

## 2019-06-26 ENCOUNTER — TELEPHONE (OUTPATIENT)
Dept: URGENT CARE | Facility: CLINIC | Age: 79
End: 2019-06-26

## 2019-06-26 LAB
BACTERIA UR CULT: ABNORMAL
BACTERIA UR CULT: ABNORMAL
SIGNIFICANT IND 70042: ABNORMAL
SITE SITE: ABNORMAL
SOURCE SOURCE: ABNORMAL

## 2019-06-27 DIAGNOSIS — R31.9 URINARY TRACT INFECTION WITH HEMATURIA, SITE UNSPECIFIED: ICD-10-CM

## 2019-06-27 DIAGNOSIS — N39.0 URINARY TRACT INFECTION WITH HEMATURIA, SITE UNSPECIFIED: ICD-10-CM

## 2019-06-27 RX ORDER — CEFDINIR 300 MG/1
300 CAPSULE ORAL EVERY 12 HOURS
Qty: 10 CAP | Refills: 0 | Status: SHIPPED | OUTPATIENT
Start: 2019-06-27 | End: 2019-07-02

## 2019-06-28 ENCOUNTER — TELEPHONE (OUTPATIENT)
Dept: URGENT CARE | Facility: PHYSICIAN GROUP | Age: 79
End: 2019-06-28

## 2019-06-28 NOTE — TELEPHONE ENCOUNTER
Patient returned phone call and I discussed urine culture results. She is aware antibiotic is at pharmacy and to complete the full course.

## 2019-09-12 ENCOUNTER — APPOINTMENT (RX ONLY)
Dept: URBAN - METROPOLITAN AREA CLINIC 4 | Facility: CLINIC | Age: 79
Setting detail: DERMATOLOGY
End: 2019-09-12

## 2019-09-12 DIAGNOSIS — L57.0 ACTINIC KERATOSIS: ICD-10-CM

## 2019-09-12 DIAGNOSIS — Z85.828 PERSONAL HISTORY OF OTHER MALIGNANT NEOPLASM OF SKIN: ICD-10-CM

## 2019-09-12 PROBLEM — D48.5 NEOPLASM OF UNCERTAIN BEHAVIOR OF SKIN: Status: ACTIVE | Noted: 2019-09-12

## 2019-09-12 PROCEDURE — ? LIQUID NITROGEN

## 2019-09-12 PROCEDURE — 17003 DESTRUCT PREMALG LES 2-14: CPT

## 2019-09-12 PROCEDURE — 17000 DESTRUCT PREMALG LESION: CPT | Mod: 59

## 2019-09-12 PROCEDURE — 99213 OFFICE O/P EST LOW 20 MIN: CPT | Mod: 25

## 2019-09-12 PROCEDURE — ? BIOPSY BY SHAVE METHOD

## 2019-09-12 PROCEDURE — 11102 TANGNTL BX SKIN SINGLE LES: CPT

## 2019-09-12 PROCEDURE — ? COUNSELING

## 2019-09-12 ASSESSMENT — LOCATION DETAILED DESCRIPTION DERM
LOCATION DETAILED: RIGHT NASAL ALA
LOCATION DETAILED: RIGHT PROXIMAL PRETIBIAL REGION
LOCATION DETAILED: RIGHT MEDIAL PROXIMAL PRETIBIAL REGION

## 2019-09-12 ASSESSMENT — LOCATION ZONE DERM
LOCATION ZONE: LEG
LOCATION ZONE: NOSE

## 2019-09-12 ASSESSMENT — LOCATION SIMPLE DESCRIPTION DERM
LOCATION SIMPLE: RIGHT PRETIBIAL REGION
LOCATION SIMPLE: RIGHT NOSE

## 2019-09-12 NOTE — PROCEDURE: BIOPSY BY SHAVE METHOD
Post-Care Instructions: I reviewed with the patient in detail post-care instructions. Patient is to keep the biopsy site dry overnight, and then apply bacitracin twice daily until healed. Patient may apply hydrogen peroxide soaks to remove any crusting.
Additional Anesthesia Volume In Cc (Will Not Render If 0): 0
Cryotherapy Text: The wound bed was treated with cryotherapy after the biopsy was performed.
Anesthesia Type: 1% lidocaine with 1:100,000 epinephrine and 408mcg clindamycin/ml and a 1:10 solution of 8.4% sodium bicarbonate
Destruction After The Procedure: No
Curettage Text: The wound bed was treated with curettage after the biopsy was performed.
Billing Type: Third-Party Bill
Lab: [id number]
Depth Of Biopsy: dermis
Notification Instructions: Patient will be notified of biopsy results. However, patient instructed to call the office if not contacted within 2 weeks.
Electrodesiccation Text: The wound bed was treated with electrodesiccation after the biopsy was performed.
Lab Facility: 
Wound Care: Petrolatum
Electrodesiccation And Curettage Text: The wound bed was treated with electrodesiccation and curettage after the biopsy was performed.
Dressing: bandage
Biopsy Type: H and E
Consent: Written consent was obtained and risks were reviewed including but not limited to scarring, infection, bleeding, scabbing, incomplete removal, nerve damage and allergy to anesthesia.
Type Of Destruction Used: Curettage
Detail Level: Detailed
Biopsy Method: Dermablade
Was A Bandage Applied: Yes
Silver Nitrate Text: The wound bed was treated with silver nitrate after the biopsy was performed.
Hemostasis: Pressure

## 2019-09-12 NOTE — HPI: EVALUATION OF SKIN LESION(S)
What Type Of Note Output Would You Prefer (Optional)?: Standard Output
How Severe Are Your Spot(S)?: mild
Have Your Spot(S) Been Treated In The Past?: has not been treated
Hpi Title: Evaluation of Skin Lesions
Family Member: Daughter

## 2019-11-05 ENCOUNTER — APPOINTMENT (RX ONLY)
Dept: URBAN - METROPOLITAN AREA CLINIC 4 | Facility: CLINIC | Age: 79
Setting detail: DERMATOLOGY
End: 2019-11-05

## 2019-11-05 DIAGNOSIS — D22 MELANOCYTIC NEVI: ICD-10-CM

## 2019-11-05 DIAGNOSIS — D18.0 HEMANGIOMA: ICD-10-CM

## 2019-11-05 DIAGNOSIS — L81.4 OTHER MELANIN HYPERPIGMENTATION: ICD-10-CM

## 2019-11-05 DIAGNOSIS — L82.1 OTHER SEBORRHEIC KERATOSIS: ICD-10-CM

## 2019-11-05 DIAGNOSIS — L57.0 ACTINIC KERATOSIS: ICD-10-CM

## 2019-11-05 PROBLEM — D18.01 HEMANGIOMA OF SKIN AND SUBCUTANEOUS TISSUE: Status: ACTIVE | Noted: 2019-11-05

## 2019-11-05 PROBLEM — D22.62 MELANOCYTIC NEVI OF LEFT UPPER LIMB, INCLUDING SHOULDER: Status: ACTIVE | Noted: 2019-11-05

## 2019-11-05 PROBLEM — D22.72 MELANOCYTIC NEVI OF LEFT LOWER LIMB, INCLUDING HIP: Status: ACTIVE | Noted: 2019-11-05

## 2019-11-05 PROBLEM — D22.61 MELANOCYTIC NEVI OF RIGHT UPPER LIMB, INCLUDING SHOULDER: Status: ACTIVE | Noted: 2019-11-05

## 2019-11-05 PROBLEM — D22.71 MELANOCYTIC NEVI OF RIGHT LOWER LIMB, INCLUDING HIP: Status: ACTIVE | Noted: 2019-11-05

## 2019-11-05 PROBLEM — D22.5 MELANOCYTIC NEVI OF TRUNK: Status: ACTIVE | Noted: 2019-11-05

## 2019-11-05 PROCEDURE — 17000 DESTRUCT PREMALG LESION: CPT

## 2019-11-05 PROCEDURE — ? COUNSELING

## 2019-11-05 PROCEDURE — ? LIQUID NITROGEN

## 2019-11-05 PROCEDURE — 99213 OFFICE O/P EST LOW 20 MIN: CPT | Mod: 25

## 2019-11-05 PROCEDURE — 17003 DESTRUCT PREMALG LES 2-14: CPT

## 2019-11-05 PROCEDURE — ? SUNSCREEN RECOMMENDATIONS

## 2019-11-05 ASSESSMENT — LOCATION SIMPLE DESCRIPTION DERM
LOCATION SIMPLE: UPPER BACK
LOCATION SIMPLE: LEFT EYEBROW
LOCATION SIMPLE: LEFT HAND
LOCATION SIMPLE: RIGHT LOWER BACK
LOCATION SIMPLE: LEFT POSTERIOR UPPER ARM
LOCATION SIMPLE: RIGHT CHEEK
LOCATION SIMPLE: LEFT THIGH
LOCATION SIMPLE: LEFT UPPER BACK
LOCATION SIMPLE: LEFT ANTERIOR NECK
LOCATION SIMPLE: NOSE
LOCATION SIMPLE: ABDOMEN
LOCATION SIMPLE: LEFT FOREARM
LOCATION SIMPLE: RIGHT HAND
LOCATION SIMPLE: RIGHT FOREARM
LOCATION SIMPLE: LEFT CHEEK
LOCATION SIMPLE: RIGHT POSTERIOR UPPER ARM
LOCATION SIMPLE: RIGHT THIGH

## 2019-11-05 ASSESSMENT — LOCATION DETAILED DESCRIPTION DERM
LOCATION DETAILED: RIGHT PROXIMAL DORSAL FOREARM
LOCATION DETAILED: LEFT INFERIOR ANTERIOR NECK
LOCATION DETAILED: PERIUMBILICAL SKIN
LOCATION DETAILED: LEFT SUPERIOR MEDIAL UPPER BACK
LOCATION DETAILED: NASAL DORSUM
LOCATION DETAILED: RIGHT RIB CAGE
LOCATION DETAILED: RIGHT CENTRAL MALAR CHEEK
LOCATION DETAILED: SUPERIOR THORACIC SPINE
LOCATION DETAILED: LEFT LATERAL EYEBROW
LOCATION DETAILED: LEFT PROXIMAL DORSAL FOREARM
LOCATION DETAILED: RIGHT DISTAL POSTERIOR UPPER ARM
LOCATION DETAILED: LEFT ANTERIOR PROXIMAL THIGH
LOCATION DETAILED: LEFT PROXIMAL POSTERIOR UPPER ARM
LOCATION DETAILED: LEFT CENTRAL MALAR CHEEK
LOCATION DETAILED: RIGHT SUPERIOR MEDIAL MIDBACK
LOCATION DETAILED: RIGHT ANTERIOR PROXIMAL THIGH
LOCATION DETAILED: RIGHT RADIAL DORSAL HAND
LOCATION DETAILED: LEFT ULNAR DORSAL HAND

## 2019-11-05 ASSESSMENT — LOCATION ZONE DERM
LOCATION ZONE: HAND
LOCATION ZONE: NECK
LOCATION ZONE: NOSE
LOCATION ZONE: TRUNK
LOCATION ZONE: FACE
LOCATION ZONE: ARM
LOCATION ZONE: LEG

## 2019-11-05 NOTE — PROCEDURE: LIQUID NITROGEN
Post-Care Instructions: I reviewed with the patient in detail post-care instructions. Patient is to wear sunprotection, and avoid picking at any of the treated lesions. Pt may apply Vaseline to crusted or scabbing areas.
Consent: The patient's consent was obtained including but not limited to risks of crusting, scabbing, blistering, scarring, darker or lighter pigmentary change, recurrence, incomplete removal and infection.
Detail Level: Simple
Duration Of Freeze Thaw-Cycle (Seconds): 3
Render Note In Bullet Format When Appropriate: No
Number Of Freeze-Thaw Cycles: 2 freeze-thaw cycles

## 2019-11-05 NOTE — HPI: FULL BODY SKIN EXAMINATION
How Severe Are Your Spot(S)?: mild
What Type Of Note Output Would You Prefer (Optional)?: Standard Output
What Is The Reason For Today's Visit?: Full Body Skin Examination
What Is The Reason For Today's Visit? (Being Monitored For X): concerning skin lesions on an annual basis
Additional History: FBE.

## 2019-12-26 ENCOUNTER — OUTPATIENT INFUSION SERVICES (OUTPATIENT)
Dept: ONCOLOGY | Facility: MEDICAL CENTER | Age: 79
End: 2019-12-26
Attending: FAMILY MEDICINE
Payer: MEDICARE

## 2019-12-26 VITALS
OXYGEN SATURATION: 90 % | HEART RATE: 73 BPM | DIASTOLIC BLOOD PRESSURE: 62 MMHG | BODY MASS INDEX: 19.06 KG/M2 | HEIGHT: 61 IN | WEIGHT: 100.97 LBS | RESPIRATION RATE: 18 BRPM | SYSTOLIC BLOOD PRESSURE: 130 MMHG | TEMPERATURE: 97.4 F

## 2019-12-26 DIAGNOSIS — M81.0 OSTEOPOROSIS, UNSPECIFIED OSTEOPOROSIS TYPE, UNSPECIFIED PATHOLOGICAL FRACTURE PRESENCE: ICD-10-CM

## 2019-12-26 LAB
CA-I BLD ISE-SCNC: 1.04 MMOL/L (ref 1.1–1.3)
CREAT BLD-MCNC: 0.7 MG/DL (ref 0.5–1.4)

## 2019-12-26 PROCEDURE — 36415 COLL VENOUS BLD VENIPUNCTURE: CPT

## 2019-12-26 PROCEDURE — 96372 THER/PROPH/DIAG INJ SC/IM: CPT

## 2019-12-26 PROCEDURE — 700111 HCHG RX REV CODE 636 W/ 250 OVERRIDE (IP): Mod: JG | Performed by: FAMILY MEDICINE

## 2019-12-26 PROCEDURE — 82565 ASSAY OF CREATININE: CPT

## 2019-12-26 PROCEDURE — 82330 ASSAY OF CALCIUM: CPT

## 2019-12-26 RX ADMIN — DENOSUMAB 60 MG: 60 INJECTION SUBCUTANEOUS at 15:34

## 2019-12-26 NOTE — PROGRESS NOTES
Pt arrives to \[facility]\"" for Prolia injection.  Pt denies s/sx of infection.  Pt takes two types of multivitamins and is unsure of how much calcium and Vitamin D she takes at home.  ISTAT calcium and creatinine drawn via R-AC.  Labs reviewed and calcium is 1.04 today. Pt is asymptomatic.  Informed pt of how much pharmacy recommends dose of oral calcium and vitamin D.  Prolia given SC to back of RUE.  Band-Aid placed over site.  Gave pt a copy of schedule.  Pt dc home to self care.    generalized

## 2019-12-26 NOTE — PROGRESS NOTES
Pharmacy Note:    Ca = 1.04 mmol/L  SCr = 0.7 mg/dL, est CrCl ~49 mL/min  Last Dose 6/11/19  Pt states she does not take calcium +D because it is in her multivitamin. She is asymptomatic and has been counseled about adequate supplementation. Okay to receive Prolia today.    Angela Lee, PharmD, BCOP

## 2020-06-25 ENCOUNTER — TELEPHONE (OUTPATIENT)
Dept: ONCOLOGY | Facility: MEDICAL CENTER | Age: 80
End: 2020-06-25

## 2020-06-26 ENCOUNTER — OUTPATIENT INFUSION SERVICES (OUTPATIENT)
Dept: ONCOLOGY | Facility: MEDICAL CENTER | Age: 80
End: 2020-06-26
Attending: FAMILY MEDICINE
Payer: MEDICARE

## 2020-06-26 VITALS
HEART RATE: 75 BPM | OXYGEN SATURATION: 95 % | TEMPERATURE: 98 F | HEIGHT: 60 IN | DIASTOLIC BLOOD PRESSURE: 51 MMHG | SYSTOLIC BLOOD PRESSURE: 104 MMHG | RESPIRATION RATE: 18 BRPM | WEIGHT: 101.19 LBS | BODY MASS INDEX: 19.87 KG/M2

## 2020-06-26 DIAGNOSIS — M81.0 OSTEOPOROSIS, UNSPECIFIED OSTEOPOROSIS TYPE, UNSPECIFIED PATHOLOGICAL FRACTURE PRESENCE: ICD-10-CM

## 2020-06-26 LAB
CA-I BLD ISE-SCNC: 1.2 MMOL/L (ref 1.1–1.3)
CREAT BLD-MCNC: 0.7 MG/DL (ref 0.5–1.4)

## 2020-06-26 PROCEDURE — 700111 HCHG RX REV CODE 636 W/ 250 OVERRIDE (IP): Mod: JG | Performed by: FAMILY MEDICINE

## 2020-06-26 PROCEDURE — 82330 ASSAY OF CALCIUM: CPT

## 2020-06-26 PROCEDURE — 36415 COLL VENOUS BLD VENIPUNCTURE: CPT

## 2020-06-26 PROCEDURE — 96372 THER/PROPH/DIAG INJ SC/IM: CPT

## 2020-06-26 PROCEDURE — 82565 ASSAY OF CREATININE: CPT

## 2020-06-26 RX ORDER — DENOSUMAB 60 MG/ML
INJECTION SUBCUTANEOUS
COMMUNITY
Start: 2020-02-28 | End: 2022-08-01 | Stop reason: SDUPTHER

## 2020-06-26 RX ORDER — VALACYCLOVIR HYDROCHLORIDE 500 MG/1
TABLET, FILM COATED ORAL
COMMUNITY
Start: 2020-06-24 | End: 2022-06-20 | Stop reason: SDUPTHER

## 2020-06-26 RX ADMIN — DENOSUMAB 60 MG: 60 INJECTION SUBCUTANEOUS at 14:43

## 2020-06-26 NOTE — PROGRESS NOTES
[facility] for a Prolia injection for osteoporosis.  Pt denied having any new complaints, acute infections, or dental procedures in the last month or scheduled for the next month. RN reviewed medication handout on Prolia with Pt, including potential side effects and S/S of when to follow-up with MD or ED, Pt acknowledged understanding. 25G butterfly needle used to draw blood from LAC, bleeding controlled with gauze and coban after. Ionized calcium/creatinine tested, WNL, pharmacist notified that Pt within parameters to treat.  Ruby aware to continue taking vitamin D and calcium supplements. Prolia injection given in back of left arm arm SQ. Pt tolerated well, band-aid applied to injection site. Patient states she will schedule future appointments, discharged home to self care.

## 2020-09-22 ENCOUNTER — HOSPITAL ENCOUNTER (OUTPATIENT)
Dept: RADIOLOGY | Facility: MEDICAL CENTER | Age: 80
End: 2020-09-22
Attending: INTERNAL MEDICINE
Payer: MEDICARE

## 2020-09-22 DIAGNOSIS — Z78.0 MENOPAUSE: ICD-10-CM

## 2020-09-22 DIAGNOSIS — M81.0 OSTEOPOROSIS, POST-MENOPAUSAL: ICD-10-CM

## 2020-09-22 PROCEDURE — 77080 DXA BONE DENSITY AXIAL: CPT

## 2020-10-01 ENCOUNTER — TELEPHONE (OUTPATIENT)
Dept: HEMATOLOGY ONCOLOGY | Facility: MEDICAL CENTER | Age: 80
End: 2020-10-01

## 2020-10-01 NOTE — TELEPHONE ENCOUNTER
Patient DOES NOT meet criteria for LCSP due to her smoking history. She stated she only smoked 1/2 pack a day, for 55 years, which puts her pack year hx @ 27.5.

## 2020-12-22 DIAGNOSIS — M81.0 AGE-RELATED OSTEOPOROSIS WITHOUT CURRENT PATHOLOGICAL FRACTURE: ICD-10-CM

## 2020-12-28 ENCOUNTER — TELEPHONE (OUTPATIENT)
Dept: ONCOLOGY | Facility: MEDICAL CENTER | Age: 80
End: 2020-12-28

## 2020-12-29 ENCOUNTER — OUTPATIENT INFUSION SERVICES (OUTPATIENT)
Dept: ONCOLOGY | Facility: MEDICAL CENTER | Age: 80
End: 2020-12-29
Attending: FAMILY MEDICINE
Payer: MEDICARE

## 2020-12-29 VITALS
WEIGHT: 99.43 LBS | DIASTOLIC BLOOD PRESSURE: 56 MMHG | BODY MASS INDEX: 19.52 KG/M2 | TEMPERATURE: 97.5 F | HEIGHT: 60 IN | HEART RATE: 71 BPM | SYSTOLIC BLOOD PRESSURE: 133 MMHG | OXYGEN SATURATION: 92 % | RESPIRATION RATE: 18 BRPM

## 2020-12-29 DIAGNOSIS — M81.0 AGE-RELATED OSTEOPOROSIS WITHOUT CURRENT PATHOLOGICAL FRACTURE: ICD-10-CM

## 2020-12-29 LAB
CA-I BLD ISE-SCNC: 1.21 MMOL/L (ref 1.1–1.3)
CREAT BLD-MCNC: 0.7 MG/DL (ref 0.5–1.4)

## 2020-12-29 PROCEDURE — 700111 HCHG RX REV CODE 636 W/ 250 OVERRIDE (IP): Mod: JG | Performed by: STUDENT IN AN ORGANIZED HEALTH CARE EDUCATION/TRAINING PROGRAM

## 2020-12-29 PROCEDURE — 96372 THER/PROPH/DIAG INJ SC/IM: CPT

## 2020-12-29 PROCEDURE — 82565 ASSAY OF CREATININE: CPT

## 2020-12-29 PROCEDURE — 36415 COLL VENOUS BLD VENIPUNCTURE: CPT

## 2020-12-29 PROCEDURE — 82330 ASSAY OF CALCIUM: CPT

## 2020-12-29 RX ADMIN — DENOSUMAB 60 MG: 60 INJECTION SUBCUTANEOUS at 15:15

## 2020-12-29 NOTE — PROGRESS NOTES
[facility] into [facility] for a Prolia injection for osteoporosis.  Pt denied having any new complaints, acute infections, or dental procedures in the last month or scheduled for the next month. Ruby states she has had medication prior and has no further questions at this time. 23G butterfly needle used to draw blood from LAC, bleeding controlled with gauze and coban after. Ionized calcium/creatinine tested, WNL, pharmacist notified that Pt within parameters to treat.  Ruby aware to continue taking vitamin D and calcium supplements. Prolia injection given in back of upper left arm SQ. Pt tolerated well, band-aid applied to injection site. Future appointments confirmed with Pt prior to leaving, Ruby discharged home to self care.

## 2021-01-11 DIAGNOSIS — Z23 NEED FOR VACCINATION: ICD-10-CM

## 2021-02-17 ENCOUNTER — OFFICE VISIT (OUTPATIENT)
Dept: URGENT CARE | Facility: CLINIC | Age: 81
End: 2021-02-17
Payer: MEDICARE

## 2021-02-17 ENCOUNTER — HOSPITAL ENCOUNTER (OUTPATIENT)
Facility: MEDICAL CENTER | Age: 81
End: 2021-02-17
Attending: NURSE PRACTITIONER
Payer: MEDICARE

## 2021-02-17 VITALS
OXYGEN SATURATION: 95 % | DIASTOLIC BLOOD PRESSURE: 86 MMHG | TEMPERATURE: 97.7 F | HEART RATE: 76 BPM | HEIGHT: 61 IN | WEIGHT: 98.8 LBS | SYSTOLIC BLOOD PRESSURE: 122 MMHG | RESPIRATION RATE: 16 BRPM | BODY MASS INDEX: 18.65 KG/M2

## 2021-02-17 DIAGNOSIS — M54.50 ACUTE RIGHT-SIDED LOW BACK PAIN, UNSPECIFIED WHETHER SCIATICA PRESENT: ICD-10-CM

## 2021-02-17 DIAGNOSIS — R82.998 LEUKOCYTES IN URINE: ICD-10-CM

## 2021-02-17 DIAGNOSIS — M54.50 ACUTE RIGHT-SIDED LOW BACK PAIN, UNSPECIFIED WHETHER SCIATICA PRESENT: Primary | ICD-10-CM

## 2021-02-17 LAB
APPEARANCE UR: CLEAR
BILIRUB UR STRIP-MCNC: NEGATIVE MG/DL
COLOR UR AUTO: YELLOW
GLUCOSE UR STRIP.AUTO-MCNC: NEGATIVE MG/DL
KETONES UR STRIP.AUTO-MCNC: NEGATIVE MG/DL
LEUKOCYTE ESTERASE UR QL STRIP.AUTO: NORMAL
NITRITE UR QL STRIP.AUTO: NORMAL
PH UR STRIP.AUTO: 7 [PH] (ref 5–8)
PROT UR QL STRIP: NEGATIVE MG/DL
RBC UR QL AUTO: NEGATIVE
SP GR UR STRIP.AUTO: 1.02
UROBILINOGEN UR STRIP-MCNC: 0.2 MG/DL

## 2021-02-17 PROCEDURE — 99214 OFFICE O/P EST MOD 30 MIN: CPT | Performed by: NURSE PRACTITIONER

## 2021-02-17 PROCEDURE — 87086 URINE CULTURE/COLONY COUNT: CPT

## 2021-02-17 PROCEDURE — 81002 URINALYSIS NONAUTO W/O SCOPE: CPT | Performed by: NURSE PRACTITIONER

## 2021-02-17 ASSESSMENT — ENCOUNTER SYMPTOMS
CONSTIPATION: 0
NAUSEA: 0
DIARRHEA: 0
SENSORY CHANGE: 0
COUGH: 0
HEADACHES: 0
TINGLING: 0
FEVER: 0
BACK PAIN: 1
FOCAL WEAKNESS: 0
CHILLS: 0

## 2021-02-17 ASSESSMENT — LIFESTYLE VARIABLES: SUBSTANCE_ABUSE: 0

## 2021-02-17 NOTE — PROGRESS NOTES
Ruby Buckner is a 80 y.o. female who presents for Back Pain (x 1 wk, Rt. lower back pain)      HPI this new problem.  Ruby is a 80-year-old female who presents with right-sided low back pain x1 week.  She was vacuuming at her home when she suddenly felt a pain in the right side of her back.  She was able to finish her activities. Pain is sharp to dull ache. It does not radiate. Movement can sometimes make the pain worse.   The pain has persisted for the past week.  Each day it is better in the morning and then worse at night.  She says by the evening time she has to walk hunched over.  She has been taking  500 mg tablets of Tylenol twice a day spaced apart.  She has gotten only mild relief.  Yesterday she took an Aleve which really helped reduce her discomfort.  She denies dysuria.  She reports that once before she had back pain like this and it was a urinary tract infection.  She denies trauma.  No other aggravating or alleviating factors.    Review of Systems   Constitutional: Negative for chills, fever and malaise/fatigue.   Respiratory: Negative for cough.    Cardiovascular: Negative for chest pain.   Gastrointestinal: Negative for constipation, diarrhea and nausea.   Musculoskeletal: Positive for back pain.   Neurological: Negative for tingling, sensory change, focal weakness and headaches.   Psychiatric/Behavioral: Negative for substance abuse.       No Known Allergies  History reviewed. No pertinent past medical history.  History reviewed. No pertinent surgical history.  History reviewed. No pertinent family history.  Social History     Tobacco Use   • Smoking status: Never Smoker   • Smokeless tobacco: Never Used   Substance Use Topics   • Alcohol use: No     Patient Active Problem List   Diagnosis   • Age-related osteoporosis without current pathological fracture     Current Outpatient Medications on File Prior to Visit   Medication Sig Dispense Refill   • levothyroxine (SYNTHROID) 50 MCG Tab Take 50  "mcg by mouth Every morning on an empty stomach.     • denosumab (PROLIA) 60 MG/ML Solution Prefilled Syringe PROLIA 60 MG/ML SOSY     • valACYclovir (VALTREX) 500 MG Tab VALTREX 500 MG TABS     • Multiple Vitamins-Minerals (EYE VITAMINS PO) Take  by mouth.     • MISC NATURAL PRODUCTS PO Take  by mouth. Bone up multivitamin     • acetaminophen (TYLENOL) 325 MG Tab Take 650 mg by mouth every four hours as needed.       No current facility-administered medications on file prior to visit.          Objective:     /86 (BP Location: Left arm, Patient Position: Sitting, BP Cuff Size: Small adult)   Pulse 76   Temp 36.5 °C (97.7 °F) (Temporal)   Resp 16   Ht 1.549 m (5' 1\")   Wt 44.8 kg (98 lb 12.8 oz)   SpO2 95%   BMI 18.67 kg/m²     Physical Exam  Vitals and nursing note reviewed.   Constitutional:       General: She is not in acute distress.     Appearance: Normal appearance. She is well-developed. She is not ill-appearing or toxic-appearing.   HENT:      Head: Normocephalic.   Cardiovascular:      Rate and Rhythm: Normal rate and regular rhythm.      Pulses: Normal pulses.   Pulmonary:      Effort: Pulmonary effort is normal.      Breath sounds: Normal breath sounds.   Abdominal:      General: Bowel sounds are normal.      Tenderness: There is no abdominal tenderness.   Musculoskeletal:      Lumbar back: Spasms and tenderness present. No swelling, edema, deformity, signs of trauma, lacerations or bony tenderness. Decreased range of motion. Negative right straight leg raise test and negative left straight leg raise test. No scoliosis.        Back:    Lymphadenopathy:      Cervical: No cervical adenopathy.   Skin:     General: Skin is warm and dry.   Neurological:      Mental Status: She is alert and oriented to person, place, and time.   Psychiatric:         Speech: Speech normal.         Behavior: Behavior normal.         Thought Content: Thought content normal.       UA: leukocytes in urine       Assessment " /Associated Orders:      1. Acute right-sided low back pain, unspecified whether sciatica present  POCT Urinalysis    URINE CULTURE(NEW)    diclofenac sodium 1 % Gel   2. Leukocytes in urine         Medical Decision Making:      VSS at today's acute urgent care visit.   No trauma.  Pain started when she was vacuuming.  This is an acute strain.  Patient feels better in the morning hours and then hurts again in the evening.  She is to take over-the-counter Aleve twice daily for 2 or 3 days and over-the-counter acetaminophen as needed pain-not to exceed 3000 mg in a 24-hour period.  Warm compresses/cold compresses as needed pain  Gentle range of motion activities  Educated in proper administration of medication(s) ordered today including safety, possible SE, risks, benefits, rationale and alternatives to therapy.     Discussed management options (risks,benefits, and alternatives to treatment). Pt expresses understanding and the treatment plan was agreed upon. Questions were encouraged and answered to pt's satisfaction.   Advised to follow-up with the primary care physician for recheck, reevaluation, and consideration of further management if necessary.   Return to urgent care prn if new or worsening sx or if there is no improvement in condition prn. Red flags discussed and indications to immediately call 911 or present to the Emergency Department.     I personally reviewed prior external notes and test results pertinent to today's visit.  I have independently reviewed and interpreted all diagnostics ordered during this urgent care acute visit.   Time spent evaluating this patient was at least 30 minutes and includes preparing for visit, counseling/education, exam and evaluation, obtaining history, independent interpretation and ordering lab/test/procedures/medication management. This does not include time spent on separately billable procedures/tests.      Please note that this dictation was created using voice  recognition software. I have made a reasonable attempt to correct obvious errors, but I expect that there are errors of grammar and possibly content that I did not discover before finalizing the note.    This note was electronically signed by Ivanna PINEDA, Urgent Care

## 2021-02-18 ENCOUNTER — TELEPHONE (OUTPATIENT)
Dept: URGENT CARE | Facility: CLINIC | Age: 81
End: 2021-02-18

## 2021-02-19 NOTE — TELEPHONE ENCOUNTER
[facility] called office they said medicare don't cover ICD 10 code M54.5 for urine culture. Meryl is asking if is possible to get a new code.  I called [facility] to try to get in touch with Ivanna and I was told she is already gone for the day.  I spoke with Dr. Arzate and he said he is not able to add a  new code but he said I can tell the  to enter code for Colicky pain R10.84.    was  Notified and she said code was clear by medicare.

## 2021-02-20 LAB
BACTERIA UR CULT: NORMAL
SIGNIFICANT IND 70042: NORMAL
SITE SITE: NORMAL
SOURCE SOURCE: NORMAL

## 2021-03-01 ENCOUNTER — TELEPHONE (OUTPATIENT)
Dept: URGENT CARE | Facility: CLINIC | Age: 81
End: 2021-03-01

## 2021-03-01 NOTE — TELEPHONE ENCOUNTER
Luiz Goncalves,      The patient called in regards to her urgent care visit from 2/17/2021 and the urine culture. Per the patient, she did not receive a phone regarding the culture results. The patient does not have Mychart. If you can, please call the patient at [phone].    Thank you,    Madiha

## 2021-03-02 ENCOUNTER — TELEPHONE (OUTPATIENT)
Dept: URGENT CARE | Facility: PHYSICIAN GROUP | Age: 81
End: 2021-03-02

## 2021-03-05 ENCOUNTER — HOSPITAL ENCOUNTER (OUTPATIENT)
Dept: RADIOLOGY | Facility: MEDICAL CENTER | Age: 81
End: 2021-03-05
Attending: NURSE PRACTITIONER
Payer: MEDICARE

## 2021-03-05 DIAGNOSIS — M54.16 LUMBAR RADICULOPATHY: ICD-10-CM

## 2021-03-05 DIAGNOSIS — M54.50 LOW BACK PAIN, UNSPECIFIED BACK PAIN LATERALITY, UNSPECIFIED CHRONICITY, UNSPECIFIED WHETHER SCIATICA PRESENT: ICD-10-CM

## 2021-03-05 PROCEDURE — 72148 MRI LUMBAR SPINE W/O DYE: CPT | Mod: MH

## 2021-04-12 ENCOUNTER — APPOINTMENT (RX ONLY)
Dept: URBAN - METROPOLITAN AREA CLINIC 4 | Facility: CLINIC | Age: 81
Setting detail: DERMATOLOGY
End: 2021-04-12

## 2021-04-12 DIAGNOSIS — L82.1 OTHER SEBORRHEIC KERATOSIS: ICD-10-CM

## 2021-04-12 DIAGNOSIS — D22 MELANOCYTIC NEVI: ICD-10-CM

## 2021-04-12 DIAGNOSIS — Z71.89 OTHER SPECIFIED COUNSELING: ICD-10-CM

## 2021-04-12 DIAGNOSIS — L81.4 OTHER MELANIN HYPERPIGMENTATION: ICD-10-CM

## 2021-04-12 DIAGNOSIS — D18.0 HEMANGIOMA: ICD-10-CM

## 2021-04-12 PROBLEM — D18.01 HEMANGIOMA OF SKIN AND SUBCUTANEOUS TISSUE: Status: ACTIVE | Noted: 2021-04-12

## 2021-04-12 PROBLEM — D22.9 MELANOCYTIC NEVI, UNSPECIFIED: Status: ACTIVE | Noted: 2021-04-12

## 2021-04-12 PROCEDURE — ? COUNSELING

## 2021-04-12 PROCEDURE — ? SUNSCREEN RECOMMENDATIONS

## 2021-04-12 PROCEDURE — 99213 OFFICE O/P EST LOW 20 MIN: CPT

## 2021-04-12 ASSESSMENT — LOCATION DETAILED DESCRIPTION DERM: LOCATION DETAILED: RIGHT INFERIOR POSTERIOR NECK

## 2021-04-12 ASSESSMENT — LOCATION SIMPLE DESCRIPTION DERM: LOCATION SIMPLE: POSTERIOR NECK

## 2021-04-12 ASSESSMENT — LOCATION ZONE DERM: LOCATION ZONE: NECK

## 2021-06-29 ENCOUNTER — OUTPATIENT INFUSION SERVICES (OUTPATIENT)
Dept: ONCOLOGY | Facility: MEDICAL CENTER | Age: 81
End: 2021-06-29
Attending: STUDENT IN AN ORGANIZED HEALTH CARE EDUCATION/TRAINING PROGRAM
Payer: MEDICARE

## 2021-06-29 VITALS
RESPIRATION RATE: 18 BRPM | SYSTOLIC BLOOD PRESSURE: 117 MMHG | WEIGHT: 95.9 LBS | HEIGHT: 61 IN | OXYGEN SATURATION: 98 % | HEART RATE: 59 BPM | BODY MASS INDEX: 18.11 KG/M2 | TEMPERATURE: 98 F | DIASTOLIC BLOOD PRESSURE: 68 MMHG

## 2021-06-29 DIAGNOSIS — M81.0 AGE-RELATED OSTEOPOROSIS WITHOUT CURRENT PATHOLOGICAL FRACTURE: ICD-10-CM

## 2021-06-29 LAB
CA-I BLD ISE-SCNC: 1.17 MMOL/L (ref 1.1–1.3)
CREAT BLD-MCNC: 0.7 MG/DL (ref 0.5–1.4)

## 2021-06-29 PROCEDURE — 700111 HCHG RX REV CODE 636 W/ 250 OVERRIDE (IP): Mod: JG | Performed by: STUDENT IN AN ORGANIZED HEALTH CARE EDUCATION/TRAINING PROGRAM

## 2021-06-29 PROCEDURE — 96372 THER/PROPH/DIAG INJ SC/IM: CPT

## 2021-06-29 PROCEDURE — 36415 COLL VENOUS BLD VENIPUNCTURE: CPT

## 2021-06-29 PROCEDURE — 82565 ASSAY OF CREATININE: CPT

## 2021-06-29 PROCEDURE — 82330 ASSAY OF CALCIUM: CPT

## 2021-06-29 RX ADMIN — DENOSUMAB 60 MG: 60 INJECTION SUBCUTANEOUS at 13:59

## 2021-06-29 NOTE — LETTER
[facility]   [address] [place] [id number]  Phone: [phone]  Fax: [phone]              Dear Dr. Noel,    Your patient, Ruby Buckner (: 1940), was scheduled at [facility].  Ruby's encounter diagnosis is:  1. Age-related osteoporosis without current pathological fracture  Nursing Communication    Nursing Communication    Nursing Communication    Calcium monitoring per protocol    Nursing Communication    Nursing Communication    Nursing Communication    Calcium monitoring per protocol    POCT ionized CA docked device    POCT creatinine docked device    POCT ionized CA docked device    POCT creatinine docked device    denosumab (PROLIA) injection 60 mg     She arrived for her appointment, and  the scheduled treatment was   given. These medications were administered to the patient: We administered denosumab..  Ruby Buckner  tolerated treatment. In addition, the following labs were drawn    Recent Results (from the past 24 hour(s))   POCT creatinine device results    Collection Time: 21  1:49 PM   Result Value Ref Range    Istat Creatinine 0.7 0.5 - 1.4 mg/dL   POCT ionized CA device results    Collection Time: 21  1:49 PM   Result Value Ref Range    Istat Ionized Calcium 1.17 1.10 - 1.30 mmol/L            Her next appointment is did not reschedule; because she must see her provider first.    For more information, you may review the nurse's progress notes in chart review under the notes section.       Sincerely,  Evette Peterson R.N.

## 2021-06-29 NOTE — PROGRESS NOTES
[facility] into [facility] for a Prolia injection for osteoporosis. Ruby denies any oral surgery in the past 4 weeks or plans to have oral surgery in the next 4 weeks. 23G butterfly needle used to draw blood from left AC, bleeding controlled with gauze and coban after. Ionized calcium/creatinine tested, WNL, pharmacist notified that Pt within parameters to treat. Prolia injection given in the back of left arm SQ. Ruby tolerated well, adhesive bandage applied to injection site. Ruby to follow up with MD for future plan of care. Discharged to self care; no apparent distress noted.

## 2021-07-07 DIAGNOSIS — M81.0 OSTEOPOROSIS, UNSPECIFIED OSTEOPOROSIS TYPE, UNSPECIFIED PATHOLOGICAL FRACTURE PRESENCE: ICD-10-CM

## 2021-10-27 ENCOUNTER — TELEPHONE (OUTPATIENT)
Dept: INTERNAL MEDICINE | Facility: OTHER | Age: 81
End: 2021-10-27

## 2021-10-27 DIAGNOSIS — E03.9 HYPOTHYROIDISM, UNSPECIFIED TYPE: ICD-10-CM

## 2021-10-27 PROBLEM — D72.829 LEUKOCYTOSIS: Status: ACTIVE | Noted: 2021-07-02

## 2021-10-27 PROBLEM — Z78.0 MENOPAUSE: Status: ACTIVE | Noted: 2020-08-07

## 2021-10-27 PROBLEM — F17.210 SMOKING GREATER THAN 30 PACK YEARS: Status: ACTIVE | Noted: 2020-02-28

## 2021-10-27 PROBLEM — M15.9 OSTEOARTHRITIS, GENERALIZED: Status: ACTIVE | Noted: 2020-02-28

## 2021-10-27 PROBLEM — E55.9 VITAMIN D DEFICIENCY, UNSPECIFIED: Status: ACTIVE | Noted: 2020-02-28

## 2021-10-27 PROBLEM — G62.9 NEUROPATHY: Status: ACTIVE | Noted: 2020-02-28

## 2021-10-27 PROBLEM — Z71.89 ADVANCE DIRECTIVE DISCUSSED WITH PATIENT: Status: ACTIVE | Noted: 2021-02-09

## 2021-10-27 PROBLEM — D75.89 MACROCYTOSIS: Status: ACTIVE | Noted: 2021-07-02

## 2021-10-27 PROBLEM — M25.549 HAND JOINT PAIN: Status: ACTIVE | Noted: 2020-02-28

## 2021-10-27 PROBLEM — Z63.8 CAREGIVER ROLE STRAIN: Status: ACTIVE | Noted: 2020-02-28

## 2021-10-27 PROBLEM — M81.0 OSTEOPOROSIS: Status: ACTIVE | Noted: 2020-02-28

## 2021-10-27 RX ORDER — LEVOTHYROXINE SODIUM 0.05 MG/1
50 TABLET ORAL
Qty: 90 TABLET | Status: CANCELLED | OUTPATIENT
Start: 2021-10-27

## 2021-10-27 RX ORDER — LEVOTHYROXINE SODIUM 0.1 MG/1
100 TABLET ORAL
Qty: 90 TABLET | Refills: 3 | Status: SHIPPED | OUTPATIENT
Start: 2021-10-27 | End: 2022-08-01 | Stop reason: SDUPTHER

## 2021-10-27 NOTE — TELEPHONE ENCOUNTER
REFERRING PHYSICIAN:  No referring provider defined for this encounter.       Primary Doctor No    DIAGNOSIS:    This is a 38 y.o. 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    TREATMENT SUMMARY:  Patient is now status-post 6 cycles of TCH chemotherapy under the care of Dr. Salazar / [facility]. She completed neoadjuvant chemotherapy on 10/27/20.    The patient is status post bilateral  mastectomy and sentinel node biopsy on 11/30/20.  Final pathology showed invasive ductal carcinoma, 3mm, Grade 1, less than 1 mm from the inked margin of resection, negative LN.     INTERVAL HISTORY:   Michelle Gage comes in for a post-op check.  She denies fever, chills, chest pain or shortness of breath.  She reports that she has been having issues with pain. Has been working on her range of motion at home, which is improving.     1. Lymph node, left axillary, sentinel #1, excision:   - One lymph node negative for malignancy (0/1)   - Immunostains were performed with appropriately reactive controls and the   lymph node is negative for AE1/AE3, WSK, and CAM5.2, supporting the above   interpretation.     2.  Lymph node, left axillary, sentinel #2, excision:   - One lymph node negative for malignancy (0/1)   - Immunostains were performed with appropriately reactive controls and the   lymph node is negative for AE1/AE3, WSK, and CAM5.2, supporting the above   interpretation.     3.  Lymph node, left axillary, sentinel #3, excision:   - One lymph node negative for malignancy (0/1)   - Immunostains were performed with appropriately reactive controls and the   lymph node is negative for AE1/AE3, WSK, and CAM5.2, supporting the above   interpretation.     4.  Lymph node, left axillary, sentinel #4, excision:   - Five lymph nodes negative for malignancy (0/5)   - Immunostains were performed with appropriately reactive controls and the   lymph nodes are negative for AE1/AE3,  Reordered 100mcg dose as this is what she was having from [facility]. Recent tsh this year 2.3   WSK, and CAM5.2, supporting the above   interpretation.     5. Breast, left, mastectomy:   - Residual invasive ductal carcinoma   - Tumor measures up to 3 mm and is multifocal in a background of treatment   effect   - Tumor is less than 1 mm from the inked margin of resection   - Calcifications associated with benign ductal elements   - Nipple areolar complex is negative for malignancy   - Extensive treatment effect, definitive response   - Please see SYNOPTIC REPORT below   - Pathologic stage: ypT1a ypN0     6. Breast, right, mastectomy:   - Benign breast tissue with fibrocystic changes, including: apocrine   metaplasia, microcyst formation, and duct ectasia   - Changes consistent with treatment effect   - Nipple areolar complex negative for malignancy   - Negative for in situ and invasive carcinoma   SYNOPTIC REPORT:   SITE, LATERALITY, PROCEDURE: Breast, left, mastectomy:   HISTOLOGIC TYPE: Invasive ductal carcinoma (Best seen in blocks 5E, 5H)   HISTOLOGIC GRADE: Grade 1        Tubules 2        Nuclei 2        Mitosis 1   TUMOR FOCALITY: Multifocal   TUMOR SIZE: 3 mm in a background of extensive treatment effect   DUCTAL CARCINOMA IN SITU: Not identified   TUMOR EXTENSION:        Skin: Uninvolved by tumor        Nipple: Uninvolved by tumor        Skeletal muscle: Not present in specimen   MARGINS: Uninvolved by tumor        Invasive ductal carcinoma: Tumor cells come to within less 1 mm of the   inked posterior margin and within 3 mm of the inked anterior superior margin        Ductal carcinoma in situ: Not applicable   LYMPH NODES: Uninvolved by tumor        Total number of nodes examined: 8        Total number of sentinel nodes examined: 8        Total number of lymph nodes involved: 0   LYMPHOVASCULAR INVASION: Not identified   TREATMENT EFFECT (BREAST): Extensive, definitive response   TREATMENT EFFECT (LYMPH NODE): Not identified   TUMOR MARKERS:  Tumor markers have been performed on a previous biopsy    specimen ([id number]) and the tumor cells are positive for Estrogen receptor   (2-3+, 95%) and Progesterone receptor (2+, 90%) and positive for HER2   overexpression.   ADDITIONAL FINDINGS: Calcifications associated with benign ductal elements   PATHOLOGIC STAGE: ypT1a ypN0     PHYSICAL EXAMINATION:   General:  This is a well appearing female with appropriate speech, affect and gait.     Breast:  Bilateral breast Incisions clean, dry, and intact. Right drain still in place.     IMPRESSION:   The patient has had an uneventful postoperative course.    PLAN:   1. return in 6 months for a follow up office visit and breast exam  2. The patient is advised in continued exam of the breast chest wall and to report to this office sooner should she note any areas of abnormality or concern.   3.  She has been instructed to follow with med onc for discussion of adjuvant treatment recommendations  4. Discuss at tumor board regarding close margin

## 2021-10-27 NOTE — TELEPHONE ENCOUNTER
Last seen: 07/02/21 by Dr. Noel  Next appt:  None     Was the patient seen in the last year in this department? Yes   Does patient have an active prescription for medications requested? No   Received Request Via: Pharmacy

## 2021-11-04 NOTE — TELEPHONE ENCOUNTER
Ac Robertson M.D.  Chris Calles, Med Ass't 8 days ago   GV  Prescription already refilled by Dr. Noel. No other active non-supplement meds noted after review of both Epic and [facility] medical record. Please send all urgent requests for categorical resident patients to the categorical on-call resident and and all urgent requests for PCT resident patients to the PCT on-call resident.    Message text

## 2021-12-30 ENCOUNTER — APPOINTMENT (OUTPATIENT)
Dept: ONCOLOGY | Facility: MEDICAL CENTER | Age: 81
End: 2021-12-30
Attending: STUDENT IN AN ORGANIZED HEALTH CARE EDUCATION/TRAINING PROGRAM
Payer: MEDICARE

## 2022-01-11 ENCOUNTER — OUTPATIENT INFUSION SERVICES (OUTPATIENT)
Dept: ONCOLOGY | Facility: MEDICAL CENTER | Age: 82
End: 2022-01-11
Attending: STUDENT IN AN ORGANIZED HEALTH CARE EDUCATION/TRAINING PROGRAM
Payer: MEDICARE

## 2022-01-11 VITALS
HEIGHT: 60 IN | RESPIRATION RATE: 18 BRPM | TEMPERATURE: 97.5 F | DIASTOLIC BLOOD PRESSURE: 77 MMHG | SYSTOLIC BLOOD PRESSURE: 128 MMHG | BODY MASS INDEX: 18.52 KG/M2 | OXYGEN SATURATION: 93 % | HEART RATE: 77 BPM | WEIGHT: 94.36 LBS

## 2022-01-11 DIAGNOSIS — M81.0 OSTEOPOROSIS, UNSPECIFIED OSTEOPOROSIS TYPE, UNSPECIFIED PATHOLOGICAL FRACTURE PRESENCE: ICD-10-CM

## 2022-01-11 LAB
CA-I BLD ISE-SCNC: 1.18 MMOL/L (ref 1.1–1.3)
CREAT BLD-MCNC: 0.6 MG/DL (ref 0.5–1.4)

## 2022-01-11 PROCEDURE — 82330 ASSAY OF CALCIUM: CPT

## 2022-01-11 PROCEDURE — 36415 COLL VENOUS BLD VENIPUNCTURE: CPT

## 2022-01-11 PROCEDURE — 96372 THER/PROPH/DIAG INJ SC/IM: CPT

## 2022-01-11 PROCEDURE — 82565 ASSAY OF CREATININE: CPT

## 2022-01-11 PROCEDURE — 700111 HCHG RX REV CODE 636 W/ 250 OVERRIDE (IP): Mod: JG | Performed by: STUDENT IN AN ORGANIZED HEALTH CARE EDUCATION/TRAINING PROGRAM

## 2022-01-11 RX ORDER — AMOXICILLIN 500 MG/1
CAPSULE ORAL
COMMUNITY
Start: 2021-11-16 | End: 2022-08-01

## 2022-01-11 RX ORDER — ZINC SULFATE 50(220)MG
220 CAPSULE ORAL DAILY
COMMUNITY

## 2022-01-11 RX ADMIN — DENOSUMAB 60 MG: 60 INJECTION SUBCUTANEOUS at 16:07

## 2022-01-12 NOTE — PROGRESS NOTES
Ruby arrives to [facility] for q 6 Prolia for osteoporosis. Denies active infections; denies s/s hypocalcemia; denies recent/upcoming invasive dental procedures. Patient confirms taking supplementary PO calcium and Vitamin D. ISTATs collected: serum creatinine = 0.6; ionized calcium = 1.18. Patient meets established parameters to proceed with treatment today. Prolia administered SQ to back of right arm without issues. Emailed scheduling regarding next appt. Discharged home to self care in no apparent distress.

## 2022-04-08 SDOH — ECONOMIC STABILITY: HOUSING INSECURITY: IN THE LAST 12 MONTHS, HOW MANY PLACES HAVE YOU LIVED?: 1

## 2022-04-08 SDOH — ECONOMIC STABILITY: HOUSING INSECURITY
IN THE LAST 12 MONTHS, WAS THERE A TIME WHEN YOU DID NOT HAVE A STEADY PLACE TO SLEEP OR SLEPT IN A SHELTER (INCLUDING NOW)?: NO

## 2022-04-08 SDOH — ECONOMIC STABILITY: FOOD INSECURITY: WITHIN THE PAST 12 MONTHS, THE FOOD YOU BOUGHT JUST DIDN'T LAST AND YOU DIDN'T HAVE MONEY TO GET MORE.: NEVER TRUE

## 2022-04-08 SDOH — ECONOMIC STABILITY: FOOD INSECURITY: WITHIN THE PAST 12 MONTHS, YOU WORRIED THAT YOUR FOOD WOULD RUN OUT BEFORE YOU GOT MONEY TO BUY MORE.: NEVER TRUE

## 2022-04-08 SDOH — ECONOMIC STABILITY: INCOME INSECURITY: IN THE LAST 12 MONTHS, WAS THERE A TIME WHEN YOU WERE NOT ABLE TO PAY THE MORTGAGE OR RENT ON TIME?: NO

## 2022-04-08 SDOH — ECONOMIC STABILITY: TRANSPORTATION INSECURITY
IN THE PAST 12 MONTHS, HAS THE LACK OF TRANSPORTATION KEPT YOU FROM MEDICAL APPOINTMENTS OR FROM GETTING MEDICATIONS?: NO

## 2022-04-08 SDOH — ECONOMIC STABILITY: INCOME INSECURITY: HOW HARD IS IT FOR YOU TO PAY FOR THE VERY BASICS LIKE FOOD, HOUSING, MEDICAL CARE, AND HEATING?: NOT HARD AT ALL

## 2022-04-08 SDOH — ECONOMIC STABILITY: TRANSPORTATION INSECURITY
IN THE PAST 12 MONTHS, HAS LACK OF TRANSPORTATION KEPT YOU FROM MEETINGS, WORK, OR FROM GETTING THINGS NEEDED FOR DAILY LIVING?: NO

## 2022-04-08 SDOH — HEALTH STABILITY: PHYSICAL HEALTH: ON AVERAGE, HOW MANY DAYS PER WEEK DO YOU ENGAGE IN MODERATE TO STRENUOUS EXERCISE (LIKE A BRISK WALK)?: 0 DAYS

## 2022-04-08 SDOH — HEALTH STABILITY: PHYSICAL HEALTH: ON AVERAGE, HOW MANY MINUTES DO YOU ENGAGE IN EXERCISE AT THIS LEVEL?: 0 MIN

## 2022-04-08 ASSESSMENT — LIFESTYLE VARIABLES
HOW OFTEN DO YOU HAVE SIX OR MORE DRINKS ON ONE OCCASION: NEVER
HOW MANY STANDARD DRINKS CONTAINING ALCOHOL DO YOU HAVE ON A TYPICAL DAY: 1 OR 2
HOW OFTEN DO YOU HAVE A DRINK CONTAINING ALCOHOL: 4 OR MORE TIMES A WEEK

## 2022-04-08 ASSESSMENT — SOCIAL DETERMINANTS OF HEALTH (SDOH)
HOW OFTEN DO YOU GET TOGETHER WITH FRIENDS OR RELATIVES?: MORE THAN THREE TIMES A WEEK
DO YOU BELONG TO ANY CLUBS OR ORGANIZATIONS SUCH AS CHURCH GROUPS UNIONS, FRATERNAL OR ATHLETIC GROUPS, OR SCHOOL GROUPS?: NO
IN A TYPICAL WEEK, HOW MANY TIMES DO YOU TALK ON THE PHONE WITH FAMILY, FRIENDS, OR NEIGHBORS?: MORE THAN THREE TIMES A WEEK
HOW OFTEN DO YOU ATTEND CHURCH OR RELIGIOUS SERVICES?: NEVER
HOW OFTEN DO YOU ATTENT MEETINGS OF THE CLUB OR ORGANIZATION YOU BELONG TO?: NEVER

## 2022-04-13 SDOH — ECONOMIC STABILITY: FOOD INSECURITY: WITHIN THE PAST 12 MONTHS, THE FOOD YOU BOUGHT JUST DIDN'T LAST AND YOU DIDN'T HAVE MONEY TO GET MORE.: NEVER TRUE

## 2022-04-13 SDOH — HEALTH STABILITY: PHYSICAL HEALTH: ON AVERAGE, HOW MANY MINUTES DO YOU ENGAGE IN EXERCISE AT THIS LEVEL?: 0 MIN

## 2022-04-13 SDOH — ECONOMIC STABILITY: FOOD INSECURITY: WITHIN THE PAST 12 MONTHS, YOU WORRIED THAT YOUR FOOD WOULD RUN OUT BEFORE YOU GOT MONEY TO BUY MORE.: NEVER TRUE

## 2022-04-13 SDOH — HEALTH STABILITY: MENTAL HEALTH
STRESS IS WHEN SOMEONE FEELS TENSE, NERVOUS, ANXIOUS, OR CAN'T SLEEP AT NIGHT BECAUSE THEIR MIND IS TROUBLED. HOW STRESSED ARE YOU?: NOT AT ALL

## 2022-04-13 SDOH — ECONOMIC STABILITY: INCOME INSECURITY: IN THE LAST 12 MONTHS, WAS THERE A TIME WHEN YOU WERE NOT ABLE TO PAY THE MORTGAGE OR RENT ON TIME?: NO

## 2022-04-13 SDOH — ECONOMIC STABILITY: TRANSPORTATION INSECURITY
IN THE PAST 12 MONTHS, HAS LACK OF RELIABLE TRANSPORTATION KEPT YOU FROM MEDICAL APPOINTMENTS, MEETINGS, WORK OR FROM GETTING THINGS NEEDED FOR DAILY LIVING?: NO

## 2022-04-13 SDOH — HEALTH STABILITY: PHYSICAL HEALTH: ON AVERAGE, HOW MANY DAYS PER WEEK DO YOU ENGAGE IN MODERATE TO STRENUOUS EXERCISE (LIKE A BRISK WALK)?: 0 DAYS

## 2022-04-13 SDOH — ECONOMIC STABILITY: HOUSING INSECURITY: IN THE LAST 12 MONTHS, HOW MANY PLACES HAVE YOU LIVED?: 1

## 2022-04-13 SDOH — ECONOMIC STABILITY: INCOME INSECURITY: HOW HARD IS IT FOR YOU TO PAY FOR THE VERY BASICS LIKE FOOD, HOUSING, MEDICAL CARE, AND HEATING?: NOT HARD AT ALL

## 2022-04-13 ASSESSMENT — SOCIAL DETERMINANTS OF HEALTH (SDOH)
HOW OFTEN DO YOU ATTEND CHURCH OR RELIGIOUS SERVICES?: NEVER
HOW OFTEN DO YOU ATTENT MEETINGS OF THE CLUB OR ORGANIZATION YOU BELONG TO?: NEVER
HOW OFTEN DO YOU GET TOGETHER WITH FRIENDS OR RELATIVES?: MORE THAN THREE TIMES A WEEK
HOW OFTEN DO YOU HAVE A DRINK CONTAINING ALCOHOL: 4 OR MORE TIMES A WEEK
HOW HARD IS IT FOR YOU TO PAY FOR THE VERY BASICS LIKE FOOD, HOUSING, MEDICAL CARE, AND HEATING?: NOT HARD AT ALL
HOW OFTEN DO YOU ATTENT MEETINGS OF THE CLUB OR ORGANIZATION YOU BELONG TO?: NEVER
HOW OFTEN DO YOU GET TOGETHER WITH FRIENDS OR RELATIVES?: MORE THAN THREE TIMES A WEEK
IN A TYPICAL WEEK, HOW MANY TIMES DO YOU TALK ON THE PHONE WITH FAMILY, FRIENDS, OR NEIGHBORS?: MORE THAN THREE TIMES A WEEK
HOW OFTEN DO YOU HAVE SIX OR MORE DRINKS ON ONE OCCASION: NEVER
DO YOU BELONG TO ANY CLUBS OR ORGANIZATIONS SUCH AS CHURCH GROUPS UNIONS, FRATERNAL OR ATHLETIC GROUPS, OR SCHOOL GROUPS?: NO
WITHIN THE PAST 12 MONTHS, YOU WORRIED THAT YOUR FOOD WOULD RUN OUT BEFORE YOU GOT THE MONEY TO BUY MORE: NEVER TRUE
HOW MANY DRINKS CONTAINING ALCOHOL DO YOU HAVE ON A TYPICAL DAY WHEN YOU ARE DRINKING: 1 OR 2
HOW OFTEN DO YOU ATTEND CHURCH OR RELIGIOUS SERVICES?: NEVER
DO YOU BELONG TO ANY CLUBS OR ORGANIZATIONS SUCH AS CHURCH GROUPS UNIONS, FRATERNAL OR ATHLETIC GROUPS, OR SCHOOL GROUPS?: NO
IN A TYPICAL WEEK, HOW MANY TIMES DO YOU TALK ON THE PHONE WITH FAMILY, FRIENDS, OR NEIGHBORS?: MORE THAN THREE TIMES A WEEK

## 2022-04-14 ENCOUNTER — OFFICE VISIT (OUTPATIENT)
Dept: INTERNAL MEDICINE | Facility: OTHER | Age: 82
End: 2022-04-14
Payer: MEDICARE

## 2022-04-14 VITALS
HEIGHT: 61 IN | TEMPERATURE: 98.1 F | BODY MASS INDEX: 18.09 KG/M2 | DIASTOLIC BLOOD PRESSURE: 67 MMHG | WEIGHT: 95.8 LBS | SYSTOLIC BLOOD PRESSURE: 125 MMHG | OXYGEN SATURATION: 96 % | HEART RATE: 70 BPM

## 2022-04-14 DIAGNOSIS — E03.9 HYPOTHYROIDISM, UNSPECIFIED TYPE: ICD-10-CM

## 2022-04-14 DIAGNOSIS — Z71.89 ADVANCE DIRECTIVE DISCUSSED WITH PATIENT: ICD-10-CM

## 2022-04-14 DIAGNOSIS — Z78.9 POLST (PHYSICIAN ORDERS FOR LIFE-SUSTAINING TREATMENT): ICD-10-CM

## 2022-04-14 DIAGNOSIS — R79.89 ABNORMAL CBC: ICD-10-CM

## 2022-04-14 DIAGNOSIS — E55.9 VITAMIN D DEFICIENCY, UNSPECIFIED: ICD-10-CM

## 2022-04-14 DIAGNOSIS — D75.89 MACROCYTOSIS: ICD-10-CM

## 2022-04-14 DIAGNOSIS — M81.0 AGE-RELATED OSTEOPOROSIS WITHOUT CURRENT PATHOLOGICAL FRACTURE: ICD-10-CM

## 2022-04-14 DIAGNOSIS — Z00.00 MEDICARE ANNUAL WELLNESS VISIT, SUBSEQUENT: Primary | ICD-10-CM

## 2022-04-14 PROCEDURE — G0439 PPPS, SUBSEQ VISIT: HCPCS | Mod: GC | Performed by: STUDENT IN AN ORGANIZED HEALTH CARE EDUCATION/TRAINING PROGRAM

## 2022-04-14 RX ORDER — VITAMIN B COMPLEX
1000 TABLET ORAL DAILY
COMMUNITY
End: 2023-09-06

## 2022-04-14 RX ORDER — ZINC SULFATE 50(220)MG
220 CAPSULE ORAL DAILY
COMMUNITY
End: 2022-04-14 | Stop reason: CLARIF

## 2022-04-14 ASSESSMENT — ACTIVITIES OF DAILY LIVING (ADL): BATHING_REQUIRES_ASSISTANCE: 0

## 2022-04-14 ASSESSMENT — PATIENT HEALTH QUESTIONNAIRE - PHQ9: CLINICAL INTERPRETATION OF PHQ2 SCORE: 0

## 2022-04-14 ASSESSMENT — ENCOUNTER SYMPTOMS: GENERAL WELL-BEING: GOOD

## 2022-04-14 NOTE — PATIENT INSTRUCTIONS
Please get DEXA bone scan done after September 22 2022 preferably at [facility].    Please get lab tests done one week before next visit.    Please get a (Tdap) shot at the pharmacy    Advance Directive    Advance directives are legal documents that let you make choices ahead of time about your health care and medical treatment in case you become unable to communicate for yourself. Advance directives are a way for you to communicate your wishes to family, friends, and health care providers. This can help convey your decisions about end-of-life care if you become unable to communicate.  Discussing and writing advance directives should happen over time rather than all at once. Advance directives can be changed depending on your situation and what you want, even after you have signed the advance directives.  If you do not have an advance directive, some states assign family decision makers to act on your behalf based on how closely you are related to them. Each state has its own laws regarding advance directives. You may want to check with your health care provider, , or state representative about the laws in your state. There are different types of advance directives, such as:  · Medical power of .  · Living will.  · Do not resuscitate (DNR) or do not attempt resuscitation (DNAR) order.  Health care proxy and medical power of   A health care proxy, also called a health care agent, is a person who is appointed to make medical decisions for you in cases in which you are unable to make the decisions yourself. Generally, people choose someone they know well and trust to represent their preferences. Make sure to ask this person for an agreement to act as your proxy. A proxy may have to exercise judgment in the event of a medical decision for which your wishes are not known.  A medical power of  is a legal document that names your health care proxy. Depending on the laws  in your state, after the document is written, it may also need to be:  · Signed.  · Notarized.  · Dated.  · Copied.  · Witnessed.  · Incorporated into your medical record.  You may also want to appoint someone to manage your financial affairs in a situation in which you are unable to do so. This is called a durable power of  for finances. It is a separate legal document from the durable power of  for health care. You may choose the same person or someone different from your health care proxy to act as your agent in financial matters.  If you do not appoint a proxy, or if there is a concern that the proxy is not acting in your best interests, a court-appointed guardian may be designated to act on your behalf.  Living will  A living will is a set of instructions documenting your wishes about medical care when you cannot express them yourself. Health care providers should keep a copy of your living will in your medical record. You may want to give a copy to family members or friends. To alert caregivers in case of an emergency, you can place a card in your wallet to let them know that you have a living will and where they can find it. A living will is used if you become:  · Terminally ill.  · Incapacitated.  · Unable to communicate or make decisions.  Items to consider in your living will include:  · The use or non-use of life-sustaining equipment, such as dialysis machines and breathing machines (ventilators).  · A DNR or DNAR order, which is the instruction not to use cardiopulmonary resuscitation (CPR) if breathing or heartbeat stops.  · The use or non-use of tube feeding.  · Withholding of food and fluids.  · Comfort (palliative) care when the goal becomes comfort rather than a cure.  · Organ and tissue donation.  A living will does not give instructions for distributing your money and property if you should pass away. It is recommended that you seek the advice of a  when writing a will.  Decisions about taxes, beneficiaries, and asset distribution will be legally binding. This process can relieve your family and friends of any concerns surrounding disputes or questions that may come up about the distribution of your assets.  DNR or DNAR  A DNR or DNAR order is a request not to have CPR in the event that your heart stops beating or you stop breathing. If a DNR or DNAR order has not been made and shared, a health care provider will try to help any patient whose heart has stopped or who has stopped breathing. If you plan to have surgery, talk with your health care provider about how your DNR or DNAR order will be followed if problems occur.  Summary  · Advance directives are the legal documents that allow you to make choices ahead of time about your health care and medical treatment in case you become unable to communicate for yourself.  · The process of discussing and writing advance directives should happen over time. You can change the advance directives, even after you have signed them.  · Advance directives include DNR or DNAR orders, living rhodes, and designating an agent as your medical power of .  This information is not intended to replace advice given to you by your health care provider. Make sure you discuss any questions you have with your health care provider.  Document Released: 03/26/2009 Document Revised: 01/22/2020 Document Reviewed: 11/06/2017  Elsevier Patient Education © 2020 [employer]

## 2022-04-14 NOTE — PROGRESS NOTES
Chief Complaint   Patient presents with   • Other     Pt states she is supposed to be here for an annual, she is not a new patient, she was seen by Sadie in July   • Medicare Annual Wellness                HPI:  Ruby is a 81 y.o. here for Medicare Annual Wellness Visit    Reports her mother passed away few months ago on hospice at the age of 97.  She is coping well and has strong family support  Declines COVID Vaccine    Dr rbothers- eye doctor saw in last 6mo  Seeing dentist yearly, next visit in next few months.    Patient Active Problem List    Diagnosis Date Noted   • Macrocytosis 07/02/2021   • Leukocytosis 07/02/2021   • Advance directive discussed with patient 02/09/2021   • Age-related osteoporosis without current pathological fracture 12/22/2020   • Menopause 08/07/2020   • Osteoporosis 02/28/2020   • Vitamin D deficiency, unspecified 02/28/2020   • Smoking greater than 30 pack years 02/28/2020   • Osteoarthritis, generalized 02/28/2020   • Neuropathy 02/28/2020   • Hypothyroidism 02/28/2020   • Hand joint pain 02/28/2020   • Caregiver role strain 02/28/2020       Current Outpatient Medications   Medication Sig Dispense Refill   • vitamin D3 (CHOLECALCIFEROL) 1000 Unit (25 mcg) Tab Take 1,000 Units by mouth every day.     • amoxicillin (AMOXIL) 500 MG Cap      • zinc sulfate (ZINCATE) 220 (50 Zn) MG Cap Take 220 mg by mouth every day.     • levothyroxine (SYNTHROID) 100 MCG Tab Take 1 Tablet by mouth every morning on an empty stomach. 90 Tablet 3   • denosumab (PROLIA) 60 MG/ML Solution Prefilled Syringe PROLIA 60 MG/ML SOSY     • valACYclovir (VALTREX) 500 MG Tab VALTREX 500 MG TABS     • acetaminophen (TYLENOL) 325 MG Tab Take 650 mg by mouth every four hours as needed.       No current facility-administered medications for this visit.        Patient is taking medications as noted in medication list.  Current supplements as per medication list.     Allergies: Patient has no known allergies.    Current  social contact/activities: keeping busy with family    Is patient current with immunizations? Yes.    She  reports that she has been smoking cigarettes. She has been smoking about 0.50 packs per day. She has never used smokeless tobacco. She reports current alcohol use of about 8.4 oz of alcohol per week. She reports that she does not use drugs.  Ready to quit: Not Answered  Counseling given: Not Answered        DPA/Advanced directive: Patient has Advanced Directive and Durable Power of  on file.     ROS:    Gait: Uses no assistive device   Ostomy: No   Other tubes: No   Amputations: No   Chronic oxygen use No   Last eye exam   Wears hearing aids: No  : Denies any urinary leakage during the last 6 months      Screening:        Depression Screening  Little interest or pleasure in doing things?  0 - not at all  Feeling down, depressed, or hopeless? 0 - not at all  Patient Health Questionnaire Score: 0    If depressive symptoms identified deferred to follow up visit unless specifically addressed in assessment and plan.    Interpretation of PHQ-9 Total Score   Score Severity   1-4 No Depression   5-9 Mild Depression   10-14 Moderate Depression   15-19 Moderately Severe Depression   20-27 Severe Depression    Screening for Cognitive Impairment  Three Minute Recall (daughter, heaven, mountain)  3/3    Christopher clock face with all 12 numbers and set the hands to show 10 past 11.  Yes Pt got everything correct  If cognitive concerns identified, deferred for follow up unless specifically addressed in assessment and plan.    Fall Risk Assessment  Has the patient had two or more falls in the last year or any fall with injury in the last year?  No  If fall risk identified, deferred for follow up unless specifically addressed in assessment and plan.    Safety Assessment  Throw rugs on floor.  Yes  Handrails on all stairs.  Yes  Good lighting in all hallways.  Yes  Difficulty hearing.  No  Patient counseled about all safety  risks that were identified.    Functional Assessment ADLs  Are there any barriers preventing you from cooking for yourself or meeting nutritional needs?  No.    Are there any barriers preventing you from driving safely or obtaining transportation?  No.    Are there any barriers preventing you from using a telephone or calling for help?  No.    Are there any barriers preventing you from shopping?  No.    Are there any barriers preventing you from taking care of your own finances?  No.    Are there any barriers preventing you from managing your medications?  No.    Are there any barriers preventing you from showering, bathing or dressing yourself?  No.    Are you currently engaging in any exercise or physical activity?  No.  Pt said not really, pt has 2 story house and she goes up and down the stairs constantly, sometimes walks but has been dealing with family issues and weather that have prohibited her exercise.  What is your perception of your health?  Good.    Health Maintenance Summary          Overdue - Annual Wellness Visit (Once) Overdue - never done    No completion history exists for this topic.          Overdue - IMM PNEUMOCOCCAL VACCINE: 65+ Years (1 - PCV) Overdue - never done    No completion history exists for this topic.          Overdue - IMM DTaP/Tdap/Td Vaccine (1 - Tdap) Overdue - never done    No completion history exists for this topic.          Overdue - IMM ZOSTER VACCINES (1 of 2) Overdue - never done    No completion history exists for this topic.          Postponed - COVID-19 Vaccine (1) Postponed until 4/14/2023    No completion history exists for this topic.          IMM INFLUENZA (Season Ended) Next due on 9/1/2022 09/28/2020  Imm Admin: Influenza, Unspecified - HISTORICAL DATA    09/11/2019  Imm Admin: Influenza Vaccine Adult HD    09/14/2018  Imm Admin: Influenza Vaccine Adult HD    08/31/2017  Imm Admin: Influenza Vaccine Adult HD    09/15/2016  Imm Admin: Influenza Vaccine Adult HD  "   Only the first 5 history entries have been loaded, but more history exists.          Ordered - BONE DENSITY (Every 5 Years) Ordered on 4/14/2022 09/22/2020  DS-BONE DENSITY STUDY (DEXA)          IMM HEP B VACCINE (Series Information) Aged Out    No completion history exists for this topic.          IMM MENINGOCOCCAL VACCINE (MCV4) (Series Information) Aged Out    No completion history exists for this topic.          Discontinued - MAMMOGRAM  Discontinued    No completion history exists for this topic.          Discontinued - PAP SMEAR  Discontinued    No completion history exists for this topic.          Discontinued - COLORECTAL CANCER SCREENING  Discontinued    No completion history exists for this topic.                Patient Care Team:  Alli Noel M.D. as PCP - General (Internal Medicine)    Social History     Tobacco Use   • Smoking status: Current Every Day Smoker     Packs/day: 0.50     Types: Cigarettes   • Smokeless tobacco: Never Used   Vaping Use   • Vaping Use: Never used   Substance Use Topics   • Alcohol use: Yes     Alcohol/week: 8.4 oz     Types: 14 Glasses of wine per week   • Drug use: No     No family history on file.  She  has a past medical history of Basal cell adenocarcinoma and Squamous cell skin cancer.   Past Surgical History:   Procedure Laterality Date   • HIP REPLACEMENT, TOTAL  2017   • ROTATOR CUFF REPAIR  1990    three surgeries   • ABDOMINAL HYSTERECTOMY TOTAL Bilateral    • CATARACT EXTRACTION WITH IOL     • TONSILLECTOMY             Exam:     /67 (BP Location: Left arm, Patient Position: Sitting, BP Cuff Size: Adult)   Pulse 70   Temp 36.7 °C (98.1 °F) (Temporal)   Ht 1.549 m (5' 1\")   Wt 43.5 kg (95 lb 12.8 oz)   SpO2 96%  Body mass index is 18.1 kg/m².    Hearing excellent.    Dentition good  Alert, oriented in no acute distress.  Heart regular rate and rhythm, no lower extremity edema  Eye contact is good, speech goal directed, affect " calm      Assessment and Plan. The following treatment and monitoring plan is recommended:    1. Age-related osteoporosis without current pathological fracture  DS-BONE DENSITY STUDY (DEXA)    Comp Metabolic Panel   2. Vitamin D deficiency, unspecified  VITAMIN D,25 HYDROXY   3. Hypothyroidism, unspecified type  TSH WITH REFLEX TO FT4  Continue current dose of levothyroxine   4. Abnormal CBC     5. Macrocytosis  CBC WITH DIFFERENTIAL   6. Advance directive discussed with patient     7. POLST (Physician Orders for Life-Sustaining Treatment)     Discussed lifestyle modifications including smoking cessation patient does not interested at this time.      Services suggested: No services needed at this time  Health Care Screening recommendations as per orders if indicated.  Referrals offered: PT/OT/Nutrition counseling/Behavioral Health/Smoking cessation as per orders if indicated.    Discussion today about general wellness and lifestyle habits:    · Prevent falls and reduce trip hazards; Cautioned about securing or removing rugs.  · Have a working fire alarm and carbon monoxide detector;   · Engage in regular physical activity and social activities       Follow-up: Return in about 6 months (around 10/14/2022).

## 2022-06-17 ENCOUNTER — TELEPHONE (OUTPATIENT)
Dept: INTERNAL MEDICINE | Facility: OTHER | Age: 82
End: 2022-06-17
Payer: MEDICARE

## 2022-06-17 RX ORDER — VALACYCLOVIR HYDROCHLORIDE 500 MG/1
TABLET, FILM COATED ORAL
Qty: 40 TABLET | Status: CANCELLED | OUTPATIENT
Start: 2022-06-17

## 2022-06-17 NOTE — LETTER
Brady Beltran,    I received a request to refill your Valtrex. Since it had been a while, this was transferred from our old system so it does not have a correct dose. Were you taking it for cold sores? And what was the dose and instructions on the bottle? Let me know by Polar message or call our office and then I can send the right prescription in.    Thanks,  Dr. Noel

## 2022-06-17 NOTE — TELEPHONE ENCOUNTER
Last seen: 04.14.2022 by Dr. Noel  Next appt: none    Was the patient seen in the last year in this department? Yes   Does patient have an active prescription for medications requested? No   Received Request Via: Pharmacy

## 2022-06-20 NOTE — TELEPHONE ENCOUNTER
Sent Unifysquarehart message to patient to clarify dose and then can send the right prescription in.

## 2022-06-29 ENCOUNTER — HOSPITAL ENCOUNTER (OUTPATIENT)
Dept: LAB | Facility: MEDICAL CENTER | Age: 82
End: 2022-06-29
Attending: STUDENT IN AN ORGANIZED HEALTH CARE EDUCATION/TRAINING PROGRAM
Payer: MEDICARE

## 2022-06-29 DIAGNOSIS — D75.89 MACROCYTOSIS: ICD-10-CM

## 2022-06-29 DIAGNOSIS — E55.9 VITAMIN D DEFICIENCY, UNSPECIFIED: ICD-10-CM

## 2022-06-29 DIAGNOSIS — E03.9 HYPOTHYROIDISM, UNSPECIFIED TYPE: ICD-10-CM

## 2022-06-29 DIAGNOSIS — M81.0 AGE-RELATED OSTEOPOROSIS WITHOUT CURRENT PATHOLOGICAL FRACTURE: ICD-10-CM

## 2022-06-29 LAB
25(OH)D3 SERPL-MCNC: 64 NG/ML (ref 30–100)
ALBUMIN SERPL BCP-MCNC: 4.8 G/DL (ref 3.2–4.9)
ALBUMIN/GLOB SERPL: 1.9 G/DL
ALP SERPL-CCNC: 70 U/L (ref 30–99)
ALT SERPL-CCNC: 16 U/L (ref 2–50)
ANION GAP SERPL CALC-SCNC: 12 MMOL/L (ref 7–16)
AST SERPL-CCNC: 20 U/L (ref 12–45)
BASOPHILS # BLD AUTO: 1.1 % (ref 0–1.8)
BASOPHILS # BLD: 0.06 K/UL (ref 0–0.12)
BILIRUB SERPL-MCNC: 0.4 MG/DL (ref 0.1–1.5)
BUN SERPL-MCNC: 16 MG/DL (ref 8–22)
CALCIUM SERPL-MCNC: 9.1 MG/DL (ref 8.5–10.5)
CHLORIDE SERPL-SCNC: 96 MMOL/L (ref 96–112)
CO2 SERPL-SCNC: 24 MMOL/L (ref 20–33)
CREAT SERPL-MCNC: 0.67 MG/DL (ref 0.5–1.4)
EOSINOPHIL # BLD AUTO: 0.15 K/UL (ref 0–0.51)
EOSINOPHIL NFR BLD: 2.7 % (ref 0–6.9)
ERYTHROCYTE [DISTWIDTH] IN BLOOD BY AUTOMATED COUNT: 49.7 FL (ref 35.9–50)
GFR SERPLBLD CREATININE-BSD FMLA CKD-EPI: 88 ML/MIN/1.73 M 2
GLOBULIN SER CALC-MCNC: 2.5 G/DL (ref 1.9–3.5)
GLUCOSE SERPL-MCNC: 87 MG/DL (ref 65–99)
HCT VFR BLD AUTO: 40.5 % (ref 37–47)
HGB BLD-MCNC: 14 G/DL (ref 12–16)
IMM GRANULOCYTES # BLD AUTO: 0.03 K/UL (ref 0–0.11)
IMM GRANULOCYTES NFR BLD AUTO: 0.5 % (ref 0–0.9)
LYMPHOCYTES # BLD AUTO: 1.82 K/UL (ref 1–4.8)
LYMPHOCYTES NFR BLD: 32.3 % (ref 22–41)
MCH RBC QN AUTO: 35.7 PG (ref 27–33)
MCHC RBC AUTO-ENTMCNC: 34.6 G/DL (ref 33.6–35)
MCV RBC AUTO: 103.3 FL (ref 81.4–97.8)
MONOCYTES # BLD AUTO: 0.57 K/UL (ref 0–0.85)
MONOCYTES NFR BLD AUTO: 10.1 % (ref 0–13.4)
NEUTROPHILS # BLD AUTO: 3.01 K/UL (ref 2–7.15)
NEUTROPHILS NFR BLD: 53.3 % (ref 44–72)
NRBC # BLD AUTO: 0 K/UL
NRBC BLD-RTO: 0 /100 WBC
PLATELET # BLD AUTO: 377 K/UL (ref 164–446)
PMV BLD AUTO: 9.4 FL (ref 9–12.9)
POTASSIUM SERPL-SCNC: 4.5 MMOL/L (ref 3.6–5.5)
PROT SERPL-MCNC: 7.3 G/DL (ref 6–8.2)
RBC # BLD AUTO: 3.92 M/UL (ref 4.2–5.4)
SODIUM SERPL-SCNC: 132 MMOL/L (ref 135–145)
TSH SERPL DL<=0.005 MIU/L-ACNC: 3.19 UIU/ML (ref 0.38–5.33)
WBC # BLD AUTO: 5.6 K/UL (ref 4.8–10.8)

## 2022-06-29 PROCEDURE — 80053 COMPREHEN METABOLIC PANEL: CPT

## 2022-06-29 PROCEDURE — 82306 VITAMIN D 25 HYDROXY: CPT

## 2022-06-29 PROCEDURE — 85025 COMPLETE CBC W/AUTO DIFF WBC: CPT

## 2022-06-29 PROCEDURE — 84443 ASSAY THYROID STIM HORMONE: CPT

## 2022-06-29 PROCEDURE — 36415 COLL VENOUS BLD VENIPUNCTURE: CPT

## 2022-07-05 ENCOUNTER — TELEPHONE (OUTPATIENT)
Dept: INTERNAL MEDICINE | Facility: OTHER | Age: 82
End: 2022-07-05
Payer: MEDICARE

## 2022-07-07 NOTE — TELEPHONE ENCOUNTER
Spoke to patient she needs a new order for Prolia. Patient has an appointment with Dr Mondragon on 08/01/2022 will review request at time of appointment.

## 2022-07-12 ENCOUNTER — APPOINTMENT (OUTPATIENT)
Dept: ONCOLOGY | Facility: MEDICAL CENTER | Age: 82
End: 2022-07-12
Attending: STUDENT IN AN ORGANIZED HEALTH CARE EDUCATION/TRAINING PROGRAM
Payer: MEDICARE

## 2022-08-01 ENCOUNTER — OFFICE VISIT (OUTPATIENT)
Dept: INTERNAL MEDICINE | Facility: OTHER | Age: 82
End: 2022-08-01
Payer: MEDICARE

## 2022-08-01 VITALS
OXYGEN SATURATION: 96 % | TEMPERATURE: 97.9 F | SYSTOLIC BLOOD PRESSURE: 118 MMHG | HEIGHT: 61 IN | WEIGHT: 93 LBS | BODY MASS INDEX: 17.56 KG/M2 | HEART RATE: 69 BPM | DIASTOLIC BLOOD PRESSURE: 75 MMHG

## 2022-08-01 DIAGNOSIS — F43.21 FEELING GRIEF: ICD-10-CM

## 2022-08-01 DIAGNOSIS — F19.10 SUBSTANCE ABUSE (HCC): ICD-10-CM

## 2022-08-01 DIAGNOSIS — M81.0 OSTEOPOROSIS, UNSPECIFIED OSTEOPOROSIS TYPE, UNSPECIFIED PATHOLOGICAL FRACTURE PRESENCE: ICD-10-CM

## 2022-08-01 DIAGNOSIS — E03.9 HYPOTHYROIDISM, UNSPECIFIED TYPE: ICD-10-CM

## 2022-08-01 PROCEDURE — 99214 OFFICE O/P EST MOD 30 MIN: CPT | Mod: GC | Performed by: STUDENT IN AN ORGANIZED HEALTH CARE EDUCATION/TRAINING PROGRAM

## 2022-08-01 RX ORDER — LEVOTHYROXINE SODIUM 0.1 MG/1
100 TABLET ORAL
Qty: 90 TABLET | Refills: 3 | Status: SHIPPED | OUTPATIENT
Start: 2022-08-01 | End: 2023-08-02

## 2022-08-01 RX ORDER — DENOSUMAB 60 MG/ML
INJECTION SUBCUTANEOUS
Qty: 1 ML | Refills: 2 | Status: SHIPPED | OUTPATIENT
Start: 2022-08-01

## 2022-08-01 ASSESSMENT — ENCOUNTER SYMPTOMS
COUGH: 0
DEPRESSION: 0
BLURRED VISION: 0
DIZZINESS: 0
FEVER: 0
PALPITATIONS: 0
DOUBLE VISION: 0
CHILLS: 0
HALLUCINATIONS: 0
VOMITING: 0
SORE THROAT: 0
HEARTBURN: 0
NAUSEA: 0
HEADACHES: 0
SHORTNESS OF BREATH: 0

## 2022-08-01 ASSESSMENT — FIBROSIS 4 INDEX: FIB4 SCORE: 1.05

## 2022-08-01 ASSESSMENT — LIFESTYLE VARIABLES: SUBSTANCE_ABUSE: 1

## 2022-08-01 NOTE — PATIENT INSTRUCTIONS
Thank you for coming in today, please follow-up with me in 5 weeks  Please call our clinic if you have any future questions or concerns

## 2022-08-01 NOTE — PROGRESS NOTES
Chief Complaint   Patient presents with   • Follow-Up     Re-establish care. She states that she has joint pain    • Medication Refill     Prolia    • Lab Results       HISTORY OF PRESENT ILLNESS: Patient is a 81 y.o. female established patient who presents today for the following.      1. Hypothyroidism, unspecified type  Patient with history of hypothyroidism, currently on 100 mg of Synthroid daily.  Patient reports good compliance with medication, no issues at this time.    2. Osteoporosis, unspecified osteoporosis type, unspecified pathological fracture presence  Patient with history of osteoporosis, currently on Prolia 60 mg every 6 months.  Patient requesting refill on this medication due to change in PCP.  Patient denies any recent falls or fractures.    3. Substance abuse (HCC)  Patient with history of tobacco and alcohol use.  Patient states at this time she is smoking half a pack a day for greater than 30 years, in addition she also reports drinking 2 to 3 glasses of wine daily.  Patient has no interest in quitting at this time given recent loss of her mother.    4. Feeling grief  Patient states she is currently grieving after recent  of her mother.  She denies any SI, HI, but does report overall depressed mood at this time.  Her mother passed a couple weeks ago, and she has a celebration of life for her mother planned for .      Past Medical History:   Diagnosis Date   • Basal cell adenocarcinoma    • Squamous cell skin cancer        Patient Active Problem List    Diagnosis Date Noted   • Substance abuse (HCC) 2022   • Macrocytosis 2021   • Leukocytosis 2021   • Advance directive discussed with patient 2021   • Age-related osteoporosis without current pathological fracture 2020   • Menopause 2020   • Osteoporosis 2020   • Vitamin D deficiency, unspecified 2020   • Smoking greater than 30 pack years 2020   • Osteoarthritis, generalized  02/28/2020   • Neuropathy 02/28/2020   • Hypothyroidism 02/28/2020   • Hand joint pain 02/28/2020   • Caregiver role strain 02/28/2020       Allergies:Patient has no known allergies.    Current Outpatient Medications   Medication Sig Dispense Refill   • denosumab (PROLIA) 60 MG/ML Solution Prefilled Syringe injection PROLIA 60 MG/ML SOSY 1 mL 2   • levothyroxine (SYNTHROID) 100 MCG Tab Take 1 Tablet by mouth every morning on an empty stomach. 90 Tablet 3   • vitamin D3 (CHOLECALCIFEROL) 1000 Unit (25 mcg) Tab Take 1,000 Units by mouth every day.     • zinc sulfate (ZINCATE) 220 (50 Zn) MG Cap Take 220 mg by mouth every day.     • acetaminophen (TYLENOL) 325 MG Tab Take 650 mg by mouth every four hours as needed.       No current facility-administered medications for this visit.       Social History     Tobacco Use   • Smoking status: Current Every Day Smoker     Packs/day: 0.50     Types: Cigarettes   • Smokeless tobacco: Never Used   Vaping Use   • Vaping Use: Never used   Substance Use Topics   • Alcohol use: Yes     Alcohol/week: 8.4 oz     Types: 14 Glasses of wine per week   • Drug use: No       No family history on file.      Review of Systems   Review of Systems   Constitutional: Negative for chills and fever.   HENT: Negative for hearing loss and sore throat.    Eyes: Negative for blurred vision and double vision.   Respiratory: Negative for cough and shortness of breath.    Cardiovascular: Negative for chest pain and palpitations.   Gastrointestinal: Negative for heartburn, nausea and vomiting.   Genitourinary: Negative for dysuria and hematuria.   Skin: Negative for itching and rash.   Neurological: Negative for dizziness and headaches.   Psychiatric/Behavioral: Positive for substance abuse. Negative for depression, hallucinations and suicidal ideas.       Exam:  /75 (BP Location: Left arm, Patient Position: Sitting, BP Cuff Size: Small adult)   Pulse 69   Temp 36.6 °C (97.9 °F) (Temporal)   Ht  "1.549 m (5' 1\")   Wt 42.2 kg (93 lb)   SpO2 96%  Body mass index is 17.57 kg/m².    Constitutional:  Not in acute distress, well appearing.  HEENT:   NC/AT  Cardiovascular: Regular rate and rhythm. No murmurs or gallops.      Lungs:   Clear to auscultation bilaterally. No wheezes or crackles. No respiratory distress.  Abdomen: Not distended, soft, not tender. No guarding or rigidity. No masses.  Extremities:  No cyanosis/clubbing/edema. No obvious deformities.  Skin:  Warm and dry.  No visible rashes.  Neurologic: Alert & oriented x 3, CN II-XII grossly intact, strength and sensation grossly intact.  No focal deficits noted.  Psychiatric:  Affect normal, mood normal, judgment normal.    Assessment/Plan:     1. Hypothyroidism, unspecified type  Patient with history of hypothyroidism, currently on 100 mg of Synthroid.  Most recent TSH 1 month ago 3.19.  Patient requesting refill of Synthroid, request sent  - levothyroxine (SYNTHROID) 100 MCG Tab; Take 1 Tablet by mouth every morning on an empty stomach.  Dispense: 90 Tablet; Refill: 3    2. Osteoporosis, unspecified osteoporosis type, unspecified pathological fracture presence  Patient with history of osteoporosis, most likely secondary to age.  Patient's last DEXA scan was September 2020, currently has future appointment for September 2022.  - Patient requesting refill of her Prolia, refill sent    3. Substance abuse (HCC)  Patient currently smoking half a pack a day of cigarettes, as well as consuming 2 to 3 glasses of wine nightly.  Patient states she has been sticking with this regimen for the past several years due to stress, particularly more recently given the recent loss of her mother.  - Counseled patient on dangers of chronic tobacco and alcohol use, patient would like to defer at this time given the need to grieve the loss of her mother.  - We will plan to follow-up with patient in 5 weeks in order to reassess    4. Feeling grief  Patient unfortunately " lost her mother a couple weeks ago, currently experiencing normal grief.  Denies any SI, HI.  - Provided patient with comfort and active listening during this encounter.  We will follow-up with patient in 5 weeks to continue monitoring mood and behavior.      All imaging results and lab results and consult notes are reviewed at this visit.  Followup: Return in about 5 weeks (around 9/5/2022).    Please note that this dictation was created using voice recognition software. I have made every reasonable attempt to correct obvious errors, but I expect that there are errors of grammar and possibly content that I did not discover before finalizing the note.    Jonny Mondragon MD  PGY-2  Internal Medicine

## 2022-08-02 ENCOUNTER — PATIENT MESSAGE (OUTPATIENT)
Dept: INTERNAL MEDICINE | Facility: OTHER | Age: 82
End: 2022-08-02
Payer: MEDICARE

## 2022-08-02 NOTE — PATIENT COMMUNICATION
Spoke with patient I called the Infusion center but it was closed already. I will be calling them tomorrow morning. I went over the vaccines and checked on Webiz and it looks like the time she got a vaccine was in 9/2020 for the flu. On the covid vaccine I told her it shows on Mychart that it postponed because she denied the vaccine. Patient stated that she will get the shingles, pneumonia, and flu at [facility].

## 2022-09-03 ENCOUNTER — OUTPATIENT INFUSION SERVICES (OUTPATIENT)
Dept: ONCOLOGY | Facility: MEDICAL CENTER | Age: 82
End: 2022-09-03
Attending: FAMILY MEDICINE
Payer: MEDICARE

## 2022-09-03 VITALS
RESPIRATION RATE: 16 BRPM | TEMPERATURE: 97.5 F | BODY MASS INDEX: 18.18 KG/M2 | OXYGEN SATURATION: 96 % | HEART RATE: 68 BPM | WEIGHT: 92.59 LBS | HEIGHT: 60 IN | SYSTOLIC BLOOD PRESSURE: 139 MMHG | DIASTOLIC BLOOD PRESSURE: 70 MMHG

## 2022-09-03 DIAGNOSIS — M81.0 AGE-RELATED OSTEOPOROSIS WITHOUT CURRENT PATHOLOGICAL FRACTURE: ICD-10-CM

## 2022-09-03 LAB
CA-I BLD ISE-SCNC: 1.13 MMOL/L (ref 1.1–1.3)
CREAT BLD-MCNC: 0.6 MG/DL (ref 0.5–1.4)

## 2022-09-03 PROCEDURE — 82330 ASSAY OF CALCIUM: CPT

## 2022-09-03 PROCEDURE — 700111 HCHG RX REV CODE 636 W/ 250 OVERRIDE (IP): Mod: JG | Performed by: STUDENT IN AN ORGANIZED HEALTH CARE EDUCATION/TRAINING PROGRAM

## 2022-09-03 PROCEDURE — 96372 THER/PROPH/DIAG INJ SC/IM: CPT

## 2022-09-03 PROCEDURE — 82565 ASSAY OF CREATININE: CPT

## 2022-09-03 PROCEDURE — 36415 COLL VENOUS BLD VENIPUNCTURE: CPT

## 2022-09-03 RX ORDER — DICLOFENAC SODIUM 75 MG/1
75 TABLET, DELAYED RELEASE ORAL 2 TIMES DAILY
COMMUNITY
Start: 2022-08-13 | End: 2022-10-25

## 2022-09-03 RX ORDER — METHOCARBAMOL 500 MG/1
500 TABLET, FILM COATED ORAL 3 TIMES DAILY
COMMUNITY
Start: 2022-08-13 | End: 2022-10-25

## 2022-09-03 RX ADMIN — DENOSUMAB 60 MG: 60 INJECTION SUBCUTANEOUS at 11:05

## 2022-09-03 ASSESSMENT — FIBROSIS 4 INDEX: FIB4 SCORE: 1.05

## 2022-09-03 NOTE — PROGRESS NOTES
Patient to hospitals for Prolia injection. Istat Ca+ and Cr performed. Patient meets parameters for Prolia. Prolia given in left back of arm.  Emailed scheduling for future appointment. Patient to home in care of self.

## 2022-09-23 ENCOUNTER — HOSPITAL ENCOUNTER (OUTPATIENT)
Dept: RADIOLOGY | Facility: MEDICAL CENTER | Age: 82
End: 2022-09-23
Attending: STUDENT IN AN ORGANIZED HEALTH CARE EDUCATION/TRAINING PROGRAM
Payer: MEDICARE

## 2022-09-23 DIAGNOSIS — M81.0 AGE-RELATED OSTEOPOROSIS WITHOUT CURRENT PATHOLOGICAL FRACTURE: ICD-10-CM

## 2022-09-23 PROCEDURE — 77080 DXA BONE DENSITY AXIAL: CPT

## 2022-10-25 ENCOUNTER — OFFICE VISIT (OUTPATIENT)
Dept: INTERNAL MEDICINE | Facility: OTHER | Age: 82
End: 2022-10-25
Payer: MEDICARE

## 2022-10-25 VITALS
WEIGHT: 91.6 LBS | OXYGEN SATURATION: 95 % | HEART RATE: 59 BPM | HEIGHT: 61 IN | DIASTOLIC BLOOD PRESSURE: 81 MMHG | BODY MASS INDEX: 17.29 KG/M2 | SYSTOLIC BLOOD PRESSURE: 139 MMHG | TEMPERATURE: 97.4 F

## 2022-10-25 DIAGNOSIS — M81.0 AGE-RELATED OSTEOPOROSIS WITHOUT CURRENT PATHOLOGICAL FRACTURE: ICD-10-CM

## 2022-10-25 DIAGNOSIS — Z72.0 TOBACCO USE: ICD-10-CM

## 2022-10-25 DIAGNOSIS — E03.9 HYPOTHYROIDISM, UNSPECIFIED TYPE: ICD-10-CM

## 2022-10-25 DIAGNOSIS — T14.8XXA MUSCLE STRAIN: ICD-10-CM

## 2022-10-25 PROBLEM — Z78.9 ALCOHOL USE: Status: ACTIVE | Noted: 2022-08-01

## 2022-10-25 PROBLEM — F10.90 ALCOHOL USE: Status: ACTIVE | Noted: 2022-08-01

## 2022-10-25 PROCEDURE — 99214 OFFICE O/P EST MOD 30 MIN: CPT | Mod: GC | Performed by: STUDENT IN AN ORGANIZED HEALTH CARE EDUCATION/TRAINING PROGRAM

## 2022-10-25 ASSESSMENT — ENCOUNTER SYMPTOMS
BLURRED VISION: 0
NAUSEA: 0
DEPRESSION: 0
PALPITATIONS: 0
FALLS: 0
FEVER: 0
VOMITING: 0
BACK PAIN: 0
HEARTBURN: 0
HEADACHES: 0
COUGH: 0
SORE THROAT: 0
SHORTNESS OF BREATH: 0
CHILLS: 0
ABDOMINAL PAIN: 0
MYALGIAS: 0
DIZZINESS: 0
DOUBLE VISION: 0

## 2022-10-25 ASSESSMENT — FIBROSIS 4 INDEX: FIB4 SCORE: 1.05

## 2022-10-25 NOTE — PATIENT INSTRUCTIONS
Thank you for coming in today, please follow-up with me in 6 months for an annual exam  Please get labs drawn 1 week prior to our next appointment  Please call our clinic if you have any future questions or concerns, or if you'd like to be seen sooner for any reason.

## 2022-10-26 NOTE — PROGRESS NOTES
Chief Complaint   Patient presents with    Follow-Up     5 week follow up, still having back pain       HISTORY OF PRESENT ILLNESS: Patient is a 81 y.o. female established patient who presents today for the following.        Patient presents today to discuss recent DEXA scan in addition to follow-up of her back pain.  Patient states that couple months ago she experienced a muscle strain in her back, and has been recovering slowly since then.  Patient states her pain is focused in her right lower back midway between her flank and spine.  Pain does not radiate anywhere, but did cause her significant debility early on.  Patient states she followed up with physical therapy and a chiropractor, and she has slows began to recuperate and return almost completely to her baseline.  Patient only utilizes Tylenol for the pain.  Patient is not interested in utilizing any other medications, and now that she has completed physical therapy she plans to begin working out with a .  Patient states denies any numbness, weakness, saddle anesthesia, bladder, bowel incontinence.      Past Medical History:   Diagnosis Date    Basal cell adenocarcinoma     Squamous cell skin cancer        Patient Active Problem List    Diagnosis Date Noted    Alcohol use 08/01/2022    Macrocytosis 07/02/2021    Leukocytosis 07/02/2021    Advance directive discussed with patient 02/09/2021    Age-related osteoporosis without current pathological fracture 12/22/2020    Menopause 08/07/2020    Osteoporosis 02/28/2020    Vitamin D deficiency, unspecified 02/28/2020    Tobacco use 02/28/2020    Osteoarthritis, generalized 02/28/2020    Neuropathy 02/28/2020    Hypothyroidism 02/28/2020    Hand joint pain 02/28/2020    Caregiver role strain 02/28/2020       Allergies:Patient has no known allergies.    Current Outpatient Medications   Medication Sig Dispense Refill    denosumab (PROLIA) 60 MG/ML Solution Prefilled Syringe injection PROLIA 60 MG/ML  "SOSY 1 mL 2    levothyroxine (SYNTHROID) 100 MCG Tab Take 1 Tablet by mouth every morning on an empty stomach. 90 Tablet 3    vitamin D3 (CHOLECALCIFEROL) 1000 Unit (25 mcg) Tab Take 1,000 Units by mouth every day.      zinc sulfate (ZINCATE) 220 (50 Zn) MG Cap Take 220 mg by mouth every day.      acetaminophen (TYLENOL) 325 MG Tab Take 650 mg by mouth every four hours as needed.       No current facility-administered medications for this visit.       Social History     Tobacco Use    Smoking status: Every Day     Packs/day: 0.50     Types: Cigarettes    Smokeless tobacco: Never   Vaping Use    Vaping Use: Never used   Substance Use Topics    Alcohol use: Yes     Alcohol/week: 8.4 oz     Types: 14 Glasses of wine per week    Drug use: No       No family history on file.      Review of Systems   Review of Systems   Constitutional:  Negative for chills and fever.   HENT:  Negative for hearing loss and sore throat.    Eyes:  Negative for blurred vision and double vision.   Respiratory:  Negative for cough and shortness of breath.    Cardiovascular:  Negative for chest pain and palpitations.   Gastrointestinal:  Negative for abdominal pain, heartburn, nausea and vomiting.   Musculoskeletal:  Negative for back pain, falls and myalgias.   Neurological:  Negative for dizziness and headaches.   Psychiatric/Behavioral:  Negative for depression and suicidal ideas.      Exam:  /81 (BP Location: Right arm, Patient Position: Sitting, BP Cuff Size: Small adult)   Pulse (!) 59   Temp 36.3 °C (97.4 °F) (Temporal)   Ht 1.547 m (5' 0.9\")   Wt 41.5 kg (91 lb 9.6 oz)   SpO2 95%  Body mass index is 17.36 kg/m².    Constitutional:  Not in acute distress, well appearing.  HEENT:   NC/AT  Cardiovascular: Regular rate and rhythm. No murmurs or gallops.      Lungs:   Clear to auscultation bilaterally. No wheezes or crackles. No respiratory distress.  Abdomen: Not distended, soft, not tender. No guarding or rigidity. No " masses.  Extremities:  No cyanosis/clubbing/edema. No obvious deformities.  Skin:  Warm and dry.  No visible rashes.  Neurologic: Alert & oriented x 3, CN II-XII grossly intact, strength and sensation grossly intact.  No focal deficits noted.  Psychiatric:  Affect normal, mood normal, judgment normal.    Assessment/Plan:     1. Muscle strain  Patient experiencing muscle strain in lower back, improving with conservative management.  - Encourage patient to continue to remain active, and to continue utilizing stretching and other resources learned from physical therapy in addition to her  as able.    2. Age-related osteoporosis without current pathological fracture  Patient had repeat DEXA completed in September 23, 2022.  When compared with the most recent study from September 22, 2020, there appears to be 1.2% increase in overall bone mineral density of the lumbar spine and a 5.1% increase in the bone mineral density of the left femur.  Unfortunately ago, patient's 10-year probability of fracture has increased to a 53.3% risk of major osteoporotic fracture and a 48.4% increase in hip fracture.  Of note though, patient has not experienced any fractures, and does not have any history of falls.  We will therefore continue current regimen of Prolia every 6 months and labs as below  - CBC WITH DIFFERENTIAL; Future  - Comp Metabolic Panel; Future  - TSH WITH REFLEX TO FT4; Future  - VITAMIN D,25 HYDROXY (DEFICIENCY); Future    3. Tobacco use  Patient continues to smoke half a pack a day, with 27.5-pack-year smoking history.  Encourage patient to pursue smoking cessation, however patient respectfully declines at this time.  - Patient not currently eligible for formal lung cancer screening due to less than 30-pack-year smoking history.  Will however continue to monitor for potential signs or symptoms of lung cancer.    4. Hypothyroidism, unspecified type  Patient with history of hypothyroidism, currently well  controlled on Synthroid 100 mcg  - Continue current dose and follow-up TSH  - TSH WITH REFLEX TO FT4; Future      All imaging results and lab results and consult notes are reviewed at this visit.  Followup: Return in about 6 months (around 4/25/2023) for Annual Exam.    Please note that this dictation was created using voice recognition software. I have made every reasonable attempt to correct obvious errors, but I expect that there are errors of grammar and possibly content that I did not discover before finalizing the note.    Jonny Mondragon MD  PGY-2  Internal Medicine

## 2022-11-09 ENCOUNTER — PATIENT MESSAGE (OUTPATIENT)
Dept: HEALTH INFORMATION MANAGEMENT | Facility: OTHER | Age: 82
End: 2022-11-09

## 2023-02-13 ENCOUNTER — OFFICE VISIT (OUTPATIENT)
Dept: CARDIOLOGY | Facility: MEDICAL CENTER | Age: 83
End: 2023-02-13
Payer: MEDICARE

## 2023-02-13 VITALS
WEIGHT: 95.3 LBS | RESPIRATION RATE: 14 BRPM | BODY MASS INDEX: 17.99 KG/M2 | DIASTOLIC BLOOD PRESSURE: 76 MMHG | HEIGHT: 61 IN | OXYGEN SATURATION: 95 % | HEART RATE: 67 BPM | SYSTOLIC BLOOD PRESSURE: 124 MMHG

## 2023-02-13 DIAGNOSIS — F17.200 TOBACCO DEPENDENCE: ICD-10-CM

## 2023-02-13 DIAGNOSIS — R09.89 LEFT CAROTID BRUIT: ICD-10-CM

## 2023-02-13 DIAGNOSIS — R07.89 OTHER CHEST PAIN: ICD-10-CM

## 2023-02-13 DIAGNOSIS — Z82.49 FAMILY HISTORY OF CONGESTIVE HEART FAILURE: ICD-10-CM

## 2023-02-13 LAB — EKG IMPRESSION: NORMAL

## 2023-02-13 PROCEDURE — 99204 OFFICE O/P NEW MOD 45 MIN: CPT | Mod: 25 | Performed by: INTERNAL MEDICINE

## 2023-02-13 PROCEDURE — 93000 ELECTROCARDIOGRAM COMPLETE: CPT | Performed by: INTERNAL MEDICINE

## 2023-02-13 RX ORDER — IBUPROFEN 200 MG
200 TABLET ORAL EVERY 6 HOURS PRN
COMMUNITY

## 2023-02-13 ASSESSMENT — ENCOUNTER SYMPTOMS
SORE THROAT: 0
DIZZINESS: 0
BLURRED VISION: 0
LOSS OF BALANCE: 0
NIGHT SWEATS: 0
EXCESSIVE DAYTIME SLEEPINESS: 0
LIGHT-HEADEDNESS: 0
DYSPNEA ON EXERTION: 0
CONSTIPATION: 0
ORTHOPNEA: 0
BACK PAIN: 1
MYALGIAS: 0
NAUSEA: 0
WEAKNESS: 0
FEVER: 0
SLEEP DISTURBANCES DUE TO BREATHING: 0
SYNCOPE: 0
COUGH: 0
NUMBNESS: 0
VOMITING: 0
DECREASED APPETITE: 0
SHORTNESS OF BREATH: 0
DOUBLE VISION: 0
NEAR-SYNCOPE: 0
IRREGULAR HEARTBEAT: 0
HEADACHES: 0
BLOATING: 0
PARESTHESIAS: 0
PALPITATIONS: 0
FALLS: 0
WHEEZING: 0
DIARRHEA: 0
FLANK PAIN: 0
PND: 0
DIAPHORESIS: 0

## 2023-02-13 ASSESSMENT — FIBROSIS 4 INDEX: FIB4 SCORE: 1.06

## 2023-02-13 NOTE — PROGRESS NOTES
Cardiology Initial Consultation Note    Date of note:    2023    Primary Care Provider: Jonny Mondragon M.D.  Referring Provider: Jonny Mondragon M.D.    Patient Name: Ruby Buckner   YOB: 1940  MRN:              [id number]    Chief Complaint   Patient presents with    New Patient       History of Present Illness: Ms. Ruby Buckner is a 82 y.o. female whose current medical problems include hypothyroidism and osteoporosis who is here for cardiac consultation for family history of CHF.    Patient states that she has been dealing with ongoing left ear pain.  Has been following with an audiologist with no abnormal findings.  Was told that she may have blockages in her arteries that could be contributing to her ongoing symptoms.  Has family history of congestive heart failure in several of her family members.    In terms of physical activity, has a  who she works with 2 times/week.  Is not limited by dyspnea, dizziness, lightheadedness or chest pressure.    Cardiovascular Risk Factors:  1. Smoking status: Current smoker  2. Type II Diabetes Mellitus: No   3. Hypertension: No  4. Dyslipidemia: No   5. Family history of early Coronary Artery Disease: Denies. Sister  with CHF at age 73, mother  in  with CHF at age 97  6.  Obesity and/or Metabolic Syndrome: No  7. Sedentary lifestyle: No    Review of Systems   Constitutional: Negative for decreased appetite, diaphoresis, fever, malaise/fatigue and night sweats.   HENT:  Positive for ear pain. Negative for congestion and sore throat.    Eyes:  Negative for blurred vision and double vision.   Cardiovascular:  Negative for chest pain, cyanosis, dyspnea on exertion, irregular heartbeat, leg swelling, near-syncope, orthopnea, palpitations, paroxysmal nocturnal dyspnea and syncope.   Respiratory:  Negative for cough, shortness of breath, sleep disturbances due to breathing and wheezing.    Endocrine: Negative for cold  intolerance and heat intolerance.   Musculoskeletal:  Positive for back pain and joint pain. Negative for falls and myalgias.   Gastrointestinal:  Negative for bloating, constipation, diarrhea, nausea and vomiting.   Genitourinary:  Negative for dysuria and flank pain.   Neurological:  Negative for excessive daytime sleepiness, dizziness, headaches, light-headedness, loss of balance, numbness, paresthesias and weakness.       Past Medical History:   Diagnosis Date    Basal cell adenocarcinoma     Squamous cell skin cancer          Past Surgical History:   Procedure Laterality Date    HIP REPLACEMENT, TOTAL  2017    ROTATOR CUFF REPAIR  1990    three surgeries    ABDOMINAL HYSTERECTOMY TOTAL Bilateral     CATARACT EXTRACTION WITH IOL      TONSILLECTOMY           Current Outpatient Medications   Medication Sig Dispense Refill    ibuprofen (MOTRIN) 200 MG Tab Take 200 mg by mouth every 6 hours as needed.      denosumab (PROLIA) 60 MG/ML Solution Prefilled Syringe injection PROLIA 60 MG/ML SOSY 1 mL 2    levothyroxine (SYNTHROID) 100 MCG Tab Take 1 Tablet by mouth every morning on an empty stomach. 90 Tablet 3    vitamin D3 (CHOLECALCIFEROL) 1000 Unit (25 mcg) Tab Take 1,000 Units by mouth every day.      zinc sulfate (ZINCATE) 220 (50 Zn) MG Cap Take 220 mg by mouth every day.       No current facility-administered medications for this visit.         No Known Allergies      History reviewed. No pertinent family history.      Social History     Socioeconomic History    Marital status: Single     Spouse name: Not on file    Number of children: Not on file    Years of education: Not on file    Highest education level: Bachelor's degree (e.g., BA, AB, BS)   Occupational History    Not on file   Tobacco Use    Smoking status: Every Day     Packs/day: 0.50     Types: Cigarettes    Smokeless tobacco: Never   Vaping Use    Vaping Use: Never used   Substance and Sexual Activity    Alcohol use: Yes     Alcohol/week: 8.4 oz      "Types: 14 Glasses of wine per week    Drug use: No    Sexual activity: Not on file   Other Topics Concern    Not on file   Social History Narrative    Not on file     Social Determinants of Health     Financial Resource Strain: Low Risk     Difficulty of Paying Living Expenses: Not hard at all   Food Insecurity: No Food Insecurity    Worried About Running Out of Food in the Last Year: Never true    Ran Out of Food in the Last Year: Never true   Transportation Needs: No Transportation Needs    Lack of Transportation (Medical): No    Lack of Transportation (Non-Medical): No   Physical Activity: Inactive    Days of Exercise per Week: 0 days    Minutes of Exercise per Session: 0 min   Stress: No Stress Concern Present    Feeling of Stress : Not at all   Social Connections: Socially Isolated    Frequency of Communication with Friends and Family: More than three times a week    Frequency of Social Gatherings with Friends and Family: More than three times a week    Attends Taoist Services: Never    Active Member of Clubs or Organizations: No    Attends Club or Organization Meetings: Never    Marital Status:    Intimate Partner Violence: Not on file   Housing Stability: Low Risk     Unable to Pay for Housing in the Last Year: No    Number of Places Lived in the Last Year: 1    Unstable Housing in the Last Year: No         Physical Exam:  Ambulatory Vitals  /76 (BP Location: Left arm, Patient Position: Sitting, BP Cuff Size: Adult)   Pulse 67   Resp 14   Ht 1.549 m (5' 1\")   Wt 43.2 kg (95 lb 4.8 oz)   SpO2 95%    Oxygen Therapy:  Pulse Oximetry: 95 %  BP Readings from Last 4 Encounters:   02/13/23 124/76   10/25/22 139/81   09/03/22 139/70   08/01/22 118/75       Weight/BMI: Body mass index is 18.01 kg/m².  Wt Readings from Last 4 Encounters:   02/13/23 43.2 kg (95 lb 4.8 oz)   10/25/22 41.5 kg (91 lb 9.6 oz)   09/03/22 42 kg (92 lb 9.5 oz)   08/01/22 42.2 kg (93 lb)         General: Well appearing and " in no apparent distress  Eyes: nl conjunctiva, no icteric sclera  ENT: wearing a mask, normal external appearance of ears  Neck: no visible JVP,  + left carotid bruit  Lungs: normal respiratory effort, CTAB  Heart: RRR, no murmurs, no rubs or gallops,  no edema bilateral lower extremities. No LV/RV heave on cardiac palpatation. 2+ bilateral radial pulses.    Abdomen: soft, non tender, non distended, no masses, normal bowel sounds.  No HSM.  Extremities/MSK: no clubbing, no cyanosis  Neurological: No focal sensory deficits  Psychiatric: Appropriate affect, A/O x 3, intact judgement and insight  Skin: Warm extremities      Lab Data Review:  No results found for: CHOLSTRLTOT, LDL, HDL, TRIGLYCERIDE    Lab Results   Component Value Date/Time    SODIUM 132 (L) 06/29/2022 08:27 AM    POTASSIUM 4.5 06/29/2022 08:27 AM    CHLORIDE 96 06/29/2022 08:27 AM    CO2 24 06/29/2022 08:27 AM    GLUCOSE 87 06/29/2022 08:27 AM    BUN 16 06/29/2022 08:27 AM    CREATININE 0.67 06/29/2022 08:27 AM     Lab Results   Component Value Date/Time    ALKPHOSPHAT 70 06/29/2022 08:27 AM    ASTSGOT 20 06/29/2022 08:27 AM    ALTSGPT 16 06/29/2022 08:27 AM    TBILIRUBIN 0.4 06/29/2022 08:27 AM      Lab Results   Component Value Date/Time    WBC 5.6 06/29/2022 08:27 AM     No results found for: HBA1C      Cardiac Imaging and Procedures Review:    EKG dated 2/13/2023: My personal interpretation is sinus rhythm        Assessment & Plan     1. Other chest pain  EKG    EC-ECHOCARDIOGRAM COMPLETE W/O CONT      2. Family history of congestive heart failure  EC-ECHOCARDIOGRAM COMPLETE W/O CONT      3. Left carotid bruit  US-CAROTID DOPPLER BILAT            Shared Medical Decision Making:  We discussed her ongoing symptoms in extensive detail.  Has strong family history of CHF in sister and mother.  Obtain transthoracic echocardiogram to evaluate underlying cardiac structure and function.  Rule out structural or valvular heart abnormality.    Carotid bruit  heard on physical exam today.  Obtain bilateral carotid artery ultrasound to rule out hemodynamically significant stenosis.    I have independently interpreted test results and discussed results and management with the patient.    All of patient's excellent questions were answered to the best of my knowledge and to her satisfaction.  It was a pleasure seeing Ms. Ruby Buckner in my clinic today. RTC if abnormal test results otherwise as needed. Patient is aware to call the cardiology clinic with any questions or concerns.      Julian Vieyra MD  [facility], [facility]  [address] [address]  Phone: [phone]  Fax: [phone]    Please note that this dictation was created using voice recognition software. I have made every reasonable attempt to correct obvious errors, but it is possible there are errors of grammar and possibly content that I did not discover before finalizing the note.

## 2023-02-23 ENCOUNTER — HOSPITAL ENCOUNTER (OUTPATIENT)
Dept: RADIOLOGY | Facility: MEDICAL CENTER | Age: 83
End: 2023-02-23
Attending: INTERNAL MEDICINE
Payer: MEDICARE

## 2023-02-28 ENCOUNTER — HOSPITAL ENCOUNTER (OUTPATIENT)
Dept: RADIOLOGY | Facility: MEDICAL CENTER | Age: 83
End: 2023-02-28
Attending: INTERNAL MEDICINE
Payer: MEDICARE

## 2023-02-28 DIAGNOSIS — R09.89 LEFT CAROTID BRUIT: ICD-10-CM

## 2023-02-28 PROCEDURE — 93880 EXTRACRANIAL BILAT STUDY: CPT | Mod: 26 | Performed by: INTERNAL MEDICINE

## 2023-02-28 PROCEDURE — 93880 EXTRACRANIAL BILAT STUDY: CPT

## 2023-03-11 ENCOUNTER — OUTPATIENT INFUSION SERVICES (OUTPATIENT)
Dept: ONCOLOGY | Facility: MEDICAL CENTER | Age: 83
End: 2023-03-11
Attending: FAMILY MEDICINE
Payer: MEDICARE

## 2023-03-11 VITALS
WEIGHT: 94.14 LBS | BODY MASS INDEX: 18.48 KG/M2 | TEMPERATURE: 97.3 F | DIASTOLIC BLOOD PRESSURE: 67 MMHG | HEART RATE: 69 BPM | HEIGHT: 60 IN | RESPIRATION RATE: 16 BRPM | SYSTOLIC BLOOD PRESSURE: 129 MMHG | OXYGEN SATURATION: 95 %

## 2023-03-11 DIAGNOSIS — M81.0 AGE-RELATED OSTEOPOROSIS WITHOUT CURRENT PATHOLOGICAL FRACTURE: ICD-10-CM

## 2023-03-11 LAB
CA-I BLD ISE-SCNC: 1.16 MMOL/L (ref 1.1–1.3)
CREAT BLD-MCNC: 0.6 MG/DL (ref 0.5–1.4)

## 2023-03-11 PROCEDURE — 96372 THER/PROPH/DIAG INJ SC/IM: CPT

## 2023-03-11 PROCEDURE — 700111 HCHG RX REV CODE 636 W/ 250 OVERRIDE (IP): Mod: JG | Performed by: STUDENT IN AN ORGANIZED HEALTH CARE EDUCATION/TRAINING PROGRAM

## 2023-03-11 PROCEDURE — 36415 COLL VENOUS BLD VENIPUNCTURE: CPT

## 2023-03-11 PROCEDURE — 82330 ASSAY OF CALCIUM: CPT

## 2023-03-11 PROCEDURE — 82565 ASSAY OF CREATININE: CPT

## 2023-03-11 RX ADMIN — DENOSUMAB 60 MG: 60 INJECTION SUBCUTANEOUS at 11:49

## 2023-03-11 ASSESSMENT — FIBROSIS 4 INDEX: FIB4 SCORE: 1.06

## 2023-03-12 NOTE — PROGRESS NOTES
Patient to [facility] for Prolia injection. Istat Ca+ and Cr performed. Patient meets parameters for injection. Prolia injected into right back of arm. Emailed scheduling for future appointment. Patient to home in care of self.

## 2023-04-13 SDOH — HEALTH STABILITY: PHYSICAL HEALTH: ON AVERAGE, HOW MANY MINUTES DO YOU ENGAGE IN EXERCISE AT THIS LEVEL?: 50 MIN

## 2023-04-13 SDOH — ECONOMIC STABILITY: HOUSING INSECURITY: IN THE LAST 12 MONTHS, HOW MANY PLACES HAVE YOU LIVED?: 1

## 2023-04-13 SDOH — ECONOMIC STABILITY: FOOD INSECURITY: WITHIN THE PAST 12 MONTHS, YOU WORRIED THAT YOUR FOOD WOULD RUN OUT BEFORE YOU GOT MONEY TO BUY MORE.: NEVER TRUE

## 2023-04-13 SDOH — ECONOMIC STABILITY: INCOME INSECURITY: IN THE LAST 12 MONTHS, WAS THERE A TIME WHEN YOU WERE NOT ABLE TO PAY THE MORTGAGE OR RENT ON TIME?: NO

## 2023-04-13 SDOH — ECONOMIC STABILITY: FOOD INSECURITY: WITHIN THE PAST 12 MONTHS, THE FOOD YOU BOUGHT JUST DIDN'T LAST AND YOU DIDN'T HAVE MONEY TO GET MORE.: NEVER TRUE

## 2023-04-13 SDOH — HEALTH STABILITY: PHYSICAL HEALTH: ON AVERAGE, HOW MANY DAYS PER WEEK DO YOU ENGAGE IN MODERATE TO STRENUOUS EXERCISE (LIKE A BRISK WALK)?: 3 DAYS

## 2023-04-13 ASSESSMENT — LIFESTYLE VARIABLES
HOW OFTEN DO YOU HAVE A DRINK CONTAINING ALCOHOL: 4 OR MORE TIMES A WEEK
HOW MANY STANDARD DRINKS CONTAINING ALCOHOL DO YOU HAVE ON A TYPICAL DAY: 1 OR 2
AUDIT-C TOTAL SCORE: 4
SKIP TO QUESTIONS 9-10: 1
HOW OFTEN DO YOU HAVE SIX OR MORE DRINKS ON ONE OCCASION: NEVER

## 2023-04-13 ASSESSMENT — SOCIAL DETERMINANTS OF HEALTH (SDOH)
HOW OFTEN DO YOU HAVE A DRINK CONTAINING ALCOHOL: 4 OR MORE TIMES A WEEK
IN A TYPICAL WEEK, HOW MANY TIMES DO YOU TALK ON THE PHONE WITH FAMILY, FRIENDS, OR NEIGHBORS?: MORE THAN THREE TIMES A WEEK
HOW OFTEN DO YOU GET TOGETHER WITH FRIENDS OR RELATIVES?: MORE THAN THREE TIMES A WEEK
HOW OFTEN DO YOU ATTEND CHURCH OR RELIGIOUS SERVICES?: NEVER
DO YOU BELONG TO ANY CLUBS OR ORGANIZATIONS SUCH AS CHURCH GROUPS UNIONS, FRATERNAL OR ATHLETIC GROUPS, OR SCHOOL GROUPS?: NO
HOW OFTEN DO YOU GET TOGETHER WITH FRIENDS OR RELATIVES?: MORE THAN THREE TIMES A WEEK
HOW OFTEN DO YOU ATTENT MEETINGS OF THE CLUB OR ORGANIZATION YOU BELONG TO?: NEVER
DO YOU BELONG TO ANY CLUBS OR ORGANIZATIONS SUCH AS CHURCH GROUPS UNIONS, FRATERNAL OR ATHLETIC GROUPS, OR SCHOOL GROUPS?: NO
WITHIN THE PAST 12 MONTHS, YOU WORRIED THAT YOUR FOOD WOULD RUN OUT BEFORE YOU GOT THE MONEY TO BUY MORE: NEVER TRUE
HOW MANY DRINKS CONTAINING ALCOHOL DO YOU HAVE ON A TYPICAL DAY WHEN YOU ARE DRINKING: 1 OR 2
IN A TYPICAL WEEK, HOW MANY TIMES DO YOU TALK ON THE PHONE WITH FAMILY, FRIENDS, OR NEIGHBORS?: MORE THAN THREE TIMES A WEEK
HOW OFTEN DO YOU ATTEND CHURCH OR RELIGIOUS SERVICES?: NEVER
HOW OFTEN DO YOU ATTENT MEETINGS OF THE CLUB OR ORGANIZATION YOU BELONG TO?: NEVER
HOW OFTEN DO YOU HAVE SIX OR MORE DRINKS ON ONE OCCASION: NEVER

## 2023-04-14 ENCOUNTER — OFFICE VISIT (OUTPATIENT)
Dept: INTERNAL MEDICINE | Facility: OTHER | Age: 83
End: 2023-04-14
Payer: MEDICARE

## 2023-04-14 VITALS
BODY MASS INDEX: 18.81 KG/M2 | HEART RATE: 66 BPM | OXYGEN SATURATION: 94 % | WEIGHT: 95.8 LBS | HEIGHT: 60 IN | TEMPERATURE: 98.5 F | DIASTOLIC BLOOD PRESSURE: 84 MMHG | SYSTOLIC BLOOD PRESSURE: 138 MMHG

## 2023-04-14 DIAGNOSIS — Z23 NEED FOR STREPTOCOCCUS PNEUMONIAE VACCINATION: ICD-10-CM

## 2023-04-14 DIAGNOSIS — Z00.00 MEDICARE ANNUAL WELLNESS VISIT, SUBSEQUENT: ICD-10-CM

## 2023-04-14 PROCEDURE — G0009 ADMIN PNEUMOCOCCAL VACCINE: HCPCS | Performed by: STUDENT IN AN ORGANIZED HEALTH CARE EDUCATION/TRAINING PROGRAM

## 2023-04-14 PROCEDURE — G0439 PPPS, SUBSEQ VISIT: HCPCS | Performed by: STUDENT IN AN ORGANIZED HEALTH CARE EDUCATION/TRAINING PROGRAM

## 2023-04-14 PROCEDURE — 90677 PCV20 VACCINE IM: CPT | Performed by: STUDENT IN AN ORGANIZED HEALTH CARE EDUCATION/TRAINING PROGRAM

## 2023-04-14 RX ORDER — VALACYCLOVIR HYDROCHLORIDE 500 MG/1
500 TABLET, FILM COATED ORAL 2 TIMES DAILY
COMMUNITY
End: 2023-04-14 | Stop reason: SDUPTHER

## 2023-04-14 RX ORDER — VALACYCLOVIR HYDROCHLORIDE 500 MG/1
500 TABLET, FILM COATED ORAL 2 TIMES DAILY PRN
Qty: 30 TABLET | Refills: 0 | Status: SHIPPED | OUTPATIENT
Start: 2023-04-14 | End: 2023-12-06 | Stop reason: SDUPTHER

## 2023-04-14 ASSESSMENT — PATIENT HEALTH QUESTIONNAIRE - PHQ9: CLINICAL INTERPRETATION OF PHQ2 SCORE: 0

## 2023-04-14 ASSESSMENT — ENCOUNTER SYMPTOMS: GENERAL WELL-BEING: GOOD

## 2023-04-14 ASSESSMENT — FIBROSIS 4 INDEX: FIB4 SCORE: 1.06

## 2023-04-14 ASSESSMENT — ACTIVITIES OF DAILY LIVING (ADL): BATHING_REQUIRES_ASSISTANCE: 0

## 2023-04-14 NOTE — PROGRESS NOTES
Chief Complaint   Patient presents with    Annual Exam       HPI:  Ruby is a 82 y.o. here for Medicare Annual Wellness Visit      Patient Active Problem List    Diagnosis Date Noted    Alcohol use 08/01/2022    Macrocytosis 07/02/2021    Leukocytosis 07/02/2021    Advance directive discussed with patient 02/09/2021    Age-related osteoporosis without current pathological fracture 12/22/2020    Menopause 08/07/2020    Osteoporosis 02/28/2020    Vitamin D deficiency, unspecified 02/28/2020    Tobacco use 02/28/2020    Osteoarthritis, generalized 02/28/2020    Neuropathy 02/28/2020    Hypothyroidism 02/28/2020    Hand joint pain 02/28/2020    Caregiver role strain 02/28/2020       Current Outpatient Medications   Medication Sig Dispense Refill    ibuprofen (MOTRIN) 200 MG Tab Take 200 mg by mouth every 6 hours as needed.      denosumab (PROLIA) 60 MG/ML Solution Prefilled Syringe injection PROLIA 60 MG/ML SOSY 1 mL 2    levothyroxine (SYNTHROID) 100 MCG Tab Take 1 Tablet by mouth every morning on an empty stomach. 90 Tablet 3    vitamin D3 (CHOLECALCIFEROL) 1000 Unit (25 mcg) Tab Take 1,000 Units by mouth every day.      zinc sulfate (ZINCATE) 220 (50 Zn) MG Cap Take 220 mg by mouth every day.       No current facility-administered medications for this visit.        Patient is taking medications as noted in medication list.  Current supplements as per medication list.     Allergies: Patient has no known allergies.    Current social contact/activities: has a       Is patient current with immunizations? No, due for Patient is up to date on all vaccines. Patient is interested in receiving NONE today.    She  reports that she has been smoking cigarettes. She has been smoking an average of .5 packs per day. She has never used smokeless tobacco. She reports current alcohol use of about 8.4 oz per week. She reports that she does not use drugs.  Ready to quit: Not Answered  Counseling given: Not  Answered      ROS:    Gait: Uses no assistive device  Ostomy: No   Other tubes: No   Amputations: No   Chronic oxygen use No   Last eye exam 12/2022   Wears hearing aids: No   : Denies any urinary leakage during the last 6 months    Screening:    Depression Screening  Little interest or pleasure in doing things?  0 - not at all  Feeling down, depressed, or hopeless? 0 - not at all  Patient Health Questionnaire Score: 0    If depressive symptoms identified deferred to follow up visit unless specifically addressed in assessment and plan.    Interpretation of PHQ-9 Total Score   Score Severity   1-4 No Depression   5-9 Mild Depression   10-14 Moderate Depression   15-19 Moderately Severe Depression   20-27 Severe Depression    Screening for Cognitive Impairment  Three Minute Recall (daughter, heaven, mountain)   /3    Christopher clock face with all 12 numbers and set the hands to show 10 past 11.       If cognitive concerns identified, deferred for follow up unless specifically addressed in assessment and plan.    Fall Risk Assessment  Has the patient had two or more falls in the last year or any fall with injury in the last year?  No  If fall risk identified, deferred for follow up unless specifically addressed in assessment and plan.    Safety Assessment  Throw rugs on floor.  No   Handrails on all stairs.  Yes   Good lighting in all hallways. Yes    Difficulty hearing.  Yes   Patient counseled about all safety risks that were identified.    Functional Assessment ADLs  Are there any barriers preventing you from cooking for yourself or meeting nutritional needs?   No    Are there any barriers preventing you from driving safely or obtaining transportation?   No    Are there any barriers preventing you from using a telephone or calling for help?   No    Are there any barriers preventing you from shopping?   No    Are there any barriers preventing you from taking care of your own finances?   No    Are there any barriers  preventing you from managing your medications?   No    Are there any barriers preventing you from showering, bathing or dressing yourself?   No    Are you currently engaging in any exercise or physical activity?   No     What is your perception of your health?   .Good    Advance Care Planning  Do you have an Advance Directive, Living Will, Durable Power of , or POLST?   Has POLST on file, will bring advanced directive               Health Maintenance Summary            Overdue - IMM PNEUMOCOCCAL VACCINE: 65+ Years (1 - PCV) Overdue - never done      No completion history exists for this topic.              Overdue - IMM DTaP/Tdap/Td Vaccine (1 - Tdap) Overdue - never done      No completion history exists for this topic.              Overdue - IMM ZOSTER VACCINES (1 of 2) Overdue - never done      No completion history exists for this topic.              Postponed - COVID-19 Vaccine (1) Postponed until 4/14/2023      No completion history exists for this topic.              Annual Wellness Visit (Every 366 Days) Next due on 4/15/2023      04/14/2022  Level of Service: [place] ANNUAL WELLNESS VISIT-INCLUDES PPPS SUBSEQUE*    04/14/2022  Visit Dx: Medicare annual wellness visit, subsequent              IMM INFLUENZA (Season Ended) Next due on 9/1/2023 09/28/2020  Imm Admin: Influenza, Unspecified - HISTORICAL DATA    09/11/2019  Imm Admin: Influenza Vaccine Adult HD    09/14/2018  Imm Admin: Influenza Vaccine Adult HD    08/31/2017  Imm Admin: Influenza Vaccine Adult HD    09/15/2016  Imm Admin: Influenza Vaccine Adult HD    Only the first 5 history entries have been loaded, but more history exists.              BONE DENSITY (Every 5 Years) Tentatively due on 9/23/2027 09/23/2022  DS-BONE DENSITY STUDY (DEXA)    09/22/2020  DS-BONE DENSITY STUDY (DEXA)              IMM HEP B VACCINE (Series Information) Aged Out      No completion history exists for this topic.              IMM MENINGOCOCCAL ACWY VACCINE  "(Series Information) Aged Out      No completion history exists for this topic.                    Patient Care Team:  Jonny Mondragon M.D. as PCP - General (Internal Medicine)    Social History     Tobacco Use    Smoking status: Every Day     Packs/day: 0.50     Types: Cigarettes    Smokeless tobacco: Never   Vaping Use    Vaping Use: Never used   Substance Use Topics    Alcohol use: Yes     Alcohol/week: 8.4 oz     Types: 14 Glasses of wine per week    Drug use: No     No family history on file.  She  has a past medical history of Basal cell adenocarcinoma and Squamous cell skin cancer.   Past Surgical History:   Procedure Laterality Date    HIP REPLACEMENT, TOTAL  2017    ROTATOR CUFF REPAIR  1990    three surgeries    ABDOMINAL HYSTERECTOMY TOTAL Bilateral     CATARACT EXTRACTION WITH IOL      TONSILLECTOMY         Exam:   /84 (BP Location: Left arm, Patient Position: Sitting, BP Cuff Size: Small adult)   Pulse 66   Temp 36.9 °C (98.5 °F) (Temporal)   Ht 1.53 m (5' 0.24\")   Wt 43.5 kg (95 lb 12.8 oz)   SpO2 94%  Body mass index is 18.56 kg/m².    Hearing excellent.    Dentition good  Alert, oriented in no acute distress.  Eye contact is good, speech goal directed, affect calm  Good eye contact     Assessment and Plan. The following treatment and monitoring plan is recommended:      1. Medicare annual wellness visit, subsequent    2. Need for Streptococcus pneumoniae vaccination  - Pneumococcal Conjugate Vaccine 20-Valent (19 yrs+)    General Wellness:  Encouraged patient to obtain shingles and TDAP booster at her earliest convenience     Services suggested: No services needed at this time  Health Care Screening recommendations as per orders if indicated.  Referrals offered: PT/OT/Nutrition counseling/Behavioral Health/Smoking cessation as per orders if indicated.    Discussion today about general wellness and lifestyle habits:    Prevent falls and reduce trip hazards; Cautioned about securing or " removing rugs.  Have a working fire alarm and carbon monoxide detector;   Engage in regular physical activity and social activities     Follow-up: Return in about 6 months (around 10/14/2023) for Routine follow-up, monitor labs.

## 2023-04-14 NOTE — PATIENT INSTRUCTIONS
Thank you for coming in today, please follow-up with me in 6 months  Please get your labs drawn at least one week before you see me  Please call our clinic if you have any future questions or concerns, or if you'd like to be seen sooner for any reason.

## 2023-06-02 ENCOUNTER — HOSPITAL ENCOUNTER (OUTPATIENT)
Dept: CARDIOLOGY | Facility: MEDICAL CENTER | Age: 83
End: 2023-06-02
Attending: INTERNAL MEDICINE
Payer: MEDICARE

## 2023-06-02 DIAGNOSIS — R07.89 OTHER CHEST PAIN: ICD-10-CM

## 2023-06-02 DIAGNOSIS — Z82.49 FAMILY HISTORY OF CONGESTIVE HEART FAILURE: ICD-10-CM

## 2023-06-02 PROCEDURE — 93306 TTE W/DOPPLER COMPLETE: CPT

## 2023-06-05 ENCOUNTER — PATIENT MESSAGE (OUTPATIENT)
Dept: CARDIOLOGY | Facility: MEDICAL CENTER | Age: 83
End: 2023-06-05

## 2023-06-05 LAB
LV EJECT FRACT  99904: 65
LV EJECT FRACT MOD 2C 99903: 76.21
LV EJECT FRACT MOD 4C 99902: 71.04
LV EJECT FRACT MOD BP 99901: 74.63

## 2023-06-05 PROCEDURE — 93306 TTE W/DOPPLER COMPLETE: CPT | Mod: 26 | Performed by: INTERNAL MEDICINE

## 2023-06-13 NOTE — PATIENT COMMUNICATION
To STEVEN: FYTAZ-pt is keeping track of her BPs now since you recommended it. She states it is averaging around 128/73. I told her to let us know if it keeps averaging in the higher 120s and in to the 130s, as you will want to know about it.

## 2023-06-14 ENCOUNTER — HOSPITAL ENCOUNTER (OUTPATIENT)
Dept: LAB | Facility: MEDICAL CENTER | Age: 83
End: 2023-06-14
Attending: CLINICAL NURSE SPECIALIST
Payer: MEDICARE

## 2023-06-14 DIAGNOSIS — E03.9 HYPOTHYROIDISM, UNSPECIFIED TYPE: ICD-10-CM

## 2023-06-14 DIAGNOSIS — M81.0 AGE-RELATED OSTEOPOROSIS WITHOUT CURRENT PATHOLOGICAL FRACTURE: ICD-10-CM

## 2023-06-14 LAB
25(OH)D3 SERPL-MCNC: 72 NG/ML (ref 30–100)
ALBUMIN SERPL BCP-MCNC: 4.4 G/DL (ref 3.2–4.9)
ALBUMIN/GLOB SERPL: 2.2 G/DL
ALP SERPL-CCNC: 67 U/L (ref 30–99)
ALT SERPL-CCNC: 17 U/L (ref 2–50)
ANION GAP SERPL CALC-SCNC: 11 MMOL/L (ref 7–16)
AST SERPL-CCNC: 20 U/L (ref 12–45)
BASOPHILS # BLD AUTO: 0.7 % (ref 0–1.8)
BASOPHILS # BLD: 0.05 K/UL (ref 0–0.12)
BILIRUB SERPL-MCNC: 0.5 MG/DL (ref 0.1–1.5)
BUN SERPL-MCNC: 10 MG/DL (ref 8–22)
CALCIUM ALBUM COR SERPL-MCNC: 8.6 MG/DL (ref 8.5–10.5)
CALCIUM SERPL-MCNC: 8.9 MG/DL (ref 8.5–10.5)
CHLORIDE SERPL-SCNC: 97 MMOL/L (ref 96–112)
CO2 SERPL-SCNC: 25 MMOL/L (ref 20–33)
CREAT SERPL-MCNC: 0.55 MG/DL (ref 0.5–1.4)
EOSINOPHIL # BLD AUTO: 0.12 K/UL (ref 0–0.51)
EOSINOPHIL NFR BLD: 1.6 % (ref 0–6.9)
ERYTHROCYTE [DISTWIDTH] IN BLOOD BY AUTOMATED COUNT: 50.3 FL (ref 35.9–50)
GFR SERPLBLD CREATININE-BSD FMLA CKD-EPI: 92 ML/MIN/1.73 M 2
GLOBULIN SER CALC-MCNC: 2 G/DL (ref 1.9–3.5)
GLUCOSE SERPL-MCNC: 96 MG/DL (ref 65–99)
HCT VFR BLD AUTO: 39.8 % (ref 37–47)
HGB BLD-MCNC: 13.5 G/DL (ref 12–16)
IMM GRANULOCYTES # BLD AUTO: 0.04 K/UL (ref 0–0.11)
IMM GRANULOCYTES NFR BLD AUTO: 0.5 % (ref 0–0.9)
LYMPHOCYTES # BLD AUTO: 1.85 K/UL (ref 1–4.8)
LYMPHOCYTES NFR BLD: 24.2 % (ref 22–41)
MCH RBC QN AUTO: 35.2 PG (ref 27–33)
MCHC RBC AUTO-ENTMCNC: 33.9 G/DL (ref 32.2–35.5)
MCV RBC AUTO: 103.9 FL (ref 81.4–97.8)
MONOCYTES # BLD AUTO: 0.71 K/UL (ref 0–0.85)
MONOCYTES NFR BLD AUTO: 9.3 % (ref 0–13.4)
NEUTROPHILS # BLD AUTO: 4.86 K/UL (ref 1.82–7.42)
NEUTROPHILS NFR BLD: 63.7 % (ref 44–72)
NRBC # BLD AUTO: 0 K/UL
NRBC BLD-RTO: 0 /100 WBC (ref 0–0.2)
PLATELET # BLD AUTO: 329 K/UL (ref 164–446)
PMV BLD AUTO: 9.1 FL (ref 9–12.9)
POTASSIUM SERPL-SCNC: 5 MMOL/L (ref 3.6–5.5)
PROT SERPL-MCNC: 6.4 G/DL (ref 6–8.2)
RBC # BLD AUTO: 3.83 M/UL (ref 4.2–5.4)
SODIUM SERPL-SCNC: 133 MMOL/L (ref 135–145)
TSH SERPL DL<=0.005 MIU/L-ACNC: 2.09 UIU/ML (ref 0.38–5.33)
WBC # BLD AUTO: 7.6 K/UL (ref 4.8–10.8)

## 2023-06-14 PROCEDURE — 84443 ASSAY THYROID STIM HORMONE: CPT

## 2023-06-14 PROCEDURE — 36415 COLL VENOUS BLD VENIPUNCTURE: CPT

## 2023-06-14 PROCEDURE — 85025 COMPLETE CBC W/AUTO DIFF WBC: CPT

## 2023-06-14 PROCEDURE — 80053 COMPREHEN METABOLIC PANEL: CPT

## 2023-06-14 PROCEDURE — 82306 VITAMIN D 25 HYDROXY: CPT

## 2023-07-11 ENCOUNTER — OFFICE VISIT (OUTPATIENT)
Dept: INTERNAL MEDICINE | Facility: OTHER | Age: 83
End: 2023-07-11
Payer: MEDICARE

## 2023-07-11 VITALS
TEMPERATURE: 98.3 F | OXYGEN SATURATION: 95 % | HEIGHT: 60 IN | SYSTOLIC BLOOD PRESSURE: 137 MMHG | HEART RATE: 61 BPM | WEIGHT: 94.8 LBS | DIASTOLIC BLOOD PRESSURE: 71 MMHG | BODY MASS INDEX: 18.61 KG/M2

## 2023-07-11 DIAGNOSIS — R05.3 CHRONIC COUGH: ICD-10-CM

## 2023-07-11 DIAGNOSIS — Z72.0 TOBACCO USE: ICD-10-CM

## 2023-07-11 DIAGNOSIS — R06.02 SHORTNESS OF BREATH: ICD-10-CM

## 2023-07-11 PROCEDURE — 3075F SYST BP GE 130 - 139MM HG: CPT | Performed by: STUDENT IN AN ORGANIZED HEALTH CARE EDUCATION/TRAINING PROGRAM

## 2023-07-11 PROCEDURE — 99214 OFFICE O/P EST MOD 30 MIN: CPT | Mod: GC | Performed by: STUDENT IN AN ORGANIZED HEALTH CARE EDUCATION/TRAINING PROGRAM

## 2023-07-11 PROCEDURE — 3078F DIAST BP <80 MM HG: CPT | Performed by: STUDENT IN AN ORGANIZED HEALTH CARE EDUCATION/TRAINING PROGRAM

## 2023-07-11 RX ORDER — OMEPRAZOLE 20 MG/1
20 CAPSULE, DELAYED RELEASE ORAL DAILY
Qty: 30 CAPSULE | Refills: 3 | Status: SHIPPED | OUTPATIENT
Start: 2023-07-11 | End: 2023-08-04

## 2023-07-11 ASSESSMENT — ENCOUNTER SYMPTOMS
BLURRED VISION: 0
DOUBLE VISION: 0
NAUSEA: 0
FEVER: 0
DIZZINESS: 0
SHORTNESS OF BREATH: 1
HEADACHES: 0
PALPITATIONS: 0
CHILLS: 0
VOMITING: 0
COUGH: 1
HEARTBURN: 1

## 2023-07-11 ASSESSMENT — FIBROSIS 4 INDEX: FIB4 SCORE: 1.18

## 2023-07-11 NOTE — PROGRESS NOTES
"Chief Complaint   Patient presents with    Referral Needed     Pulmonary/cough/SOB        HISTORY OF PRESENT ILLNESS: Patient is a 82 y.o. female established patient who presents today for the following.      1. Chronic cough  Patient states for the past 4 weeks she has been experiencing ongoing cough.  At first she described it as a \"wet\" cough, however is now persistently dry.  She reports at night she feels fluid crawling in the back of her throat.  She denies any fevers chills, nausea vomiting.    2. Shortness of breath  Patient reports experiencing some shortness of breath recently with exertion.  Patient states has been going on for several months now.  Patient saw cardiologist and underwent echocardiogram which revealed only mild mitral valve regurgitation.      Past Medical History:   Diagnosis Date    Basal cell adenocarcinoma     Squamous cell skin cancer        Patient Active Problem List    Diagnosis Date Noted    Alcohol use 08/01/2022    Macrocytosis 07/02/2021    Leukocytosis 07/02/2021    Advance directive discussed with patient 02/09/2021    Age-related osteoporosis without current pathological fracture 12/22/2020    Menopause 08/07/2020    Osteoporosis 02/28/2020    Vitamin D deficiency, unspecified 02/28/2020    Tobacco use 02/28/2020    Osteoarthritis, generalized 02/28/2020    Neuropathy 02/28/2020    Hypothyroidism 02/28/2020    Hand joint pain 02/28/2020    Caregiver role strain 02/28/2020       Allergies:Patient has no known allergies.    Current Outpatient Medications   Medication Sig Dispense Refill    Cyanocobalamin (VITAMIN B 12 PO) Take  by mouth.      omeprazole (PRILOSEC) 20 MG delayed-release capsule Take 1 Capsule by mouth every day. 30 Capsule 3    valACYclovir (VALTREX) 500 MG Tab Take 1 Tablet by mouth 2 times a day as needed (severe cold sore). 30 Tablet 0    ibuprofen (MOTRIN) 200 MG Tab Take 200 mg by mouth every 6 hours as needed.      denosumab (PROLIA) 60 MG/ML Solution " Prefilled Syringe injection PROLIA 60 MG/ML SOSY 1 mL 2    levothyroxine (SYNTHROID) 100 MCG Tab Take 1 Tablet by mouth every morning on an empty stomach. 90 Tablet 3    vitamin D3 (CHOLECALCIFEROL) 1000 Unit (25 mcg) Tab Take 1,000 Units by mouth every day.      zinc sulfate (ZINCATE) 220 (50 Zn) MG Cap Take 220 mg by mouth every day.       No current facility-administered medications for this visit.       Social History     Tobacco Use    Smoking status: Every Day     Packs/day: 0.50     Types: Cigarettes    Smokeless tobacco: Never   Vaping Use    Vaping Use: Never used   Substance Use Topics    Alcohol use: Yes     Alcohol/week: 8.4 oz     Types: 14 Glasses of wine per week    Drug use: No       No family history on file.      Review of Systems   Review of Systems   Constitutional:  Negative for chills and fever.   Eyes:  Negative for blurred vision and double vision.   Respiratory:  Positive for cough and shortness of breath.    Cardiovascular:  Negative for chest pain and palpitations.   Gastrointestinal:  Positive for heartburn. Negative for nausea and vomiting.   Genitourinary:  Negative for dysuria and urgency.   Neurological:  Negative for dizziness and headaches.       Exam:  /71 (BP Location: Left arm, Patient Position: Sitting, BP Cuff Size: Small adult)   Pulse 61   Temp 36.8 °C (98.3 °F) (Temporal)   Ht 1.524 m (5')   Wt 43 kg (94 lb 12.8 oz)   SpO2 95%  Body mass index is 18.51 kg/m².    Constitutional:  Not in acute distress, well appearing.  HEENT:   NC/AT  Cardiovascular: Regular rate and rhythm. No murmurs or gallops.      Lungs:   Clear to auscultation bilaterally. No wheezes or crackles. No respiratory distress.  Abdomen: Not distended, soft, not tender. No guarding or rigidity. No masses.  Extremities:  No cyanosis/clubbing/edema. No obvious deformities.  Skin:  Warm and dry.  No visible rashes.  Neurologic: Alert & oriented x 3, CN II-XII grossly intact, strength and sensation  grossly intact.  No focal deficits noted.  Psychiatric:  Affect normal, mood normal, judgment normal.    Assessment/Plan:     1. Chronic cough  Suspect chronic cough most likely secondary to underlying GERD.  We will therefore trial patient on Prilosec.  We will also order chest x-ray to rule out potential underlying pneumonia.  - DX-CHEST-2 VIEWS; Future  - omeprazole (PRILOSEC) 20 MG delayed-release capsule; Take 1 Capsule by mouth every day.  Dispense: 30 Capsule; Refill: 3    2. Shortness of breath  3. Tobacco use  Unclear cause of exertional shortness of breath, however given significant tobacco use history, will proceed with PFTs to evaluate for potential underlying COPD.  - PULMONARY FUNCTION TESTS -Test requested: Complete Pulmonary Function Test; Future  - DX-CHEST-2 VIEWS; Future  -Discussed with patient      All imaging results and lab results and consult notes are reviewed at this visit.  Followup: Return in about 5 weeks (around 8/15/2023) for Cough follow-up, osteoporosis management.    Please note that this dictation was created using voice recognition software. I have made every reasonable attempt to correct obvious errors, but I expect that there are errors of grammar and possibly content that I did not discover before finalizing the note.    Jonny Mondragon MD  PGY-3  Internal Medicine

## 2023-07-11 NOTE — PATIENT INSTRUCTIONS
Thank you for coming in today, please follow-up with me in 5-6 weeks  Please get your chest xray completed at your earliest convenience   Please get your pulmonary lung test completed at your earliest convenience as well  Please take your omeprazole daily  Please call our clinic if you have any future questions or concerns, or if you'd like to be seen sooner for any reason

## 2023-07-22 ENCOUNTER — HOSPITAL ENCOUNTER (OUTPATIENT)
Dept: RADIOLOGY | Facility: MEDICAL CENTER | Age: 83
End: 2023-07-22
Attending: STUDENT IN AN ORGANIZED HEALTH CARE EDUCATION/TRAINING PROGRAM
Payer: MEDICARE

## 2023-07-22 DIAGNOSIS — R05.3 CHRONIC COUGH: ICD-10-CM

## 2023-07-22 DIAGNOSIS — R06.02 SHORTNESS OF BREATH: ICD-10-CM

## 2023-07-22 PROCEDURE — 71046 X-RAY EXAM CHEST 2 VIEWS: CPT

## 2023-07-24 ENCOUNTER — TELEPHONE (OUTPATIENT)
Dept: INTERNAL MEDICINE | Facility: OTHER | Age: 83
End: 2023-07-24
Payer: MEDICARE

## 2023-07-24 DIAGNOSIS — R91.1 LUNG NODULE SEEN ON IMAGING STUDY: ICD-10-CM

## 2023-07-24 NOTE — TELEPHONE ENCOUNTER
Called patient to discuss findings from most recent CXR which reveal concern for lung nodule. Will place order for CT scan for further evaluation. Patient understands, orders placed.

## 2023-07-27 ENCOUNTER — NON-PROVIDER VISIT (OUTPATIENT)
Dept: SLEEP MEDICINE | Facility: MEDICAL CENTER | Age: 83
End: 2023-07-27
Attending: STUDENT IN AN ORGANIZED HEALTH CARE EDUCATION/TRAINING PROGRAM
Payer: MEDICARE

## 2023-07-27 VITALS — BODY MASS INDEX: 17.88 KG/M2 | WEIGHT: 94.7 LBS | HEIGHT: 61 IN

## 2023-07-27 DIAGNOSIS — Z72.0 TOBACCO USE: ICD-10-CM

## 2023-07-27 DIAGNOSIS — R06.02 SHORTNESS OF BREATH: ICD-10-CM

## 2023-07-27 PROCEDURE — 94726 PLETHYSMOGRAPHY LUNG VOLUMES: CPT | Performed by: STUDENT IN AN ORGANIZED HEALTH CARE EDUCATION/TRAINING PROGRAM

## 2023-07-27 PROCEDURE — 94726 PLETHYSMOGRAPHY LUNG VOLUMES: CPT | Mod: 26 | Performed by: STUDENT IN AN ORGANIZED HEALTH CARE EDUCATION/TRAINING PROGRAM

## 2023-07-27 PROCEDURE — 94729 DIFFUSING CAPACITY: CPT | Performed by: STUDENT IN AN ORGANIZED HEALTH CARE EDUCATION/TRAINING PROGRAM

## 2023-07-27 PROCEDURE — 94729 DIFFUSING CAPACITY: CPT | Mod: 26 | Performed by: STUDENT IN AN ORGANIZED HEALTH CARE EDUCATION/TRAINING PROGRAM

## 2023-07-27 PROCEDURE — 94060 EVALUATION OF WHEEZING: CPT | Mod: 26 | Performed by: STUDENT IN AN ORGANIZED HEALTH CARE EDUCATION/TRAINING PROGRAM

## 2023-07-27 PROCEDURE — 94060 EVALUATION OF WHEEZING: CPT | Performed by: STUDENT IN AN ORGANIZED HEALTH CARE EDUCATION/TRAINING PROGRAM

## 2023-07-27 ASSESSMENT — PULMONARY FUNCTION TESTS
FEV1_PERCENT_PREDICTED: 76
FVC_PREDICTED: 2.35
FVC_PERCENT_PREDICTED: 118
FEV1/FVC: 50
FVC: 2.79
FEV1/FVC_PERCENT_PREDICTED: 64
FEV1_PREDICTED: 1.82
FEV1_PERCENT_CHANGE: 23
FEV1/FVC: 49.82
FVC: 2.23
FVC_PERCENT_PREDICTED: 94
FEV1: 1.39
FEV1/FVC_PERCENT_CHANGE: -9
FEV1/FVC_PERCENT_PREDICTED: 71
FEV1/FVC_PERCENT_PREDICTED: 77
FEV1/FVC: 56
FVC_LLN: 1.96
FEV1/FVC_PERCENT_CHANGE: 35
FEV1/FVC: 56
FEV1/FVC_PREDICTED: 78
FEV1_PERCENT_PREDICTED: 68
FEV1/FVC_PERCENT_PREDICTED: 64
FEV1: 1.24
FEV1_PERCENT_CHANGE: 8
FEV1/FVC_PERCENT_LLN: 65
FEV1_LLN: 1.52
FEV1/FVC_PERCENT_PREDICTED: 72

## 2023-07-27 ASSESSMENT — FIBROSIS 4 INDEX: FIB4 SCORE: 1.18

## 2023-07-27 NOTE — PROCEDURES
Tech: Brandy Dias, RT  Good patient effort & cooperation.  Test was performed on the [employer] Body Plethysmograph- Elite DX system.  The predicted sets used for Spirometry are GLI-2012, for Lung Volumes are ITS, and for DLCO is GLI 2017.  The results of this test meet the ATS standards for acceptability and repeatability.  The DLCO was uncorrected for Hb.  A bronchodilator of Ventolin HFA 2 puffs via spacer was administered.  DLCO was performed during dilation period.  IV less than 90%.    Interpretation:     There is moderate obstructioni without a significant bronchodilator response. The increase in RV and TLC is suggestive of hyperinflation. The reduced DLCO and DL/VA are consistent pulmonary emphysema and can also be seen with pulmonary vacular disease - recommend clinical correlation.   The MVV is reduced proportionally to the degree of reduction in FEV1.

## 2023-08-02 DIAGNOSIS — E03.9 HYPOTHYROIDISM, UNSPECIFIED TYPE: ICD-10-CM

## 2023-08-02 RX ORDER — LEVOTHYROXINE SODIUM 100 MCG
100 TABLET ORAL
Qty: 90 TABLET | Refills: 3 | Status: SHIPPED | OUTPATIENT
Start: 2023-08-02 | End: 2023-08-10 | Stop reason: SDUPTHER

## 2023-08-04 DIAGNOSIS — R05.3 CHRONIC COUGH: ICD-10-CM

## 2023-08-04 RX ORDER — OMEPRAZOLE 20 MG/1
20 CAPSULE, DELAYED RELEASE ORAL DAILY
Qty: 30 CAPSULE | Refills: 3 | Status: SHIPPED | OUTPATIENT
Start: 2023-08-04 | End: 2023-08-15

## 2023-08-04 RX ORDER — OMEPRAZOLE 20 MG/1
20 CAPSULE, DELAYED RELEASE ORAL DAILY
Qty: 30 CAPSULE | Refills: 3 | OUTPATIENT
Start: 2023-08-04

## 2023-08-10 ENCOUNTER — HOSPITAL ENCOUNTER (OUTPATIENT)
Dept: RADIOLOGY | Facility: MEDICAL CENTER | Age: 83
End: 2023-08-10
Attending: STUDENT IN AN ORGANIZED HEALTH CARE EDUCATION/TRAINING PROGRAM
Payer: MEDICARE

## 2023-08-10 ENCOUNTER — TELEPHONE (OUTPATIENT)
Dept: INTERNAL MEDICINE | Facility: OTHER | Age: 83
End: 2023-08-10
Payer: MEDICARE

## 2023-08-10 DIAGNOSIS — E03.9 HYPOTHYROIDISM, UNSPECIFIED TYPE: ICD-10-CM

## 2023-08-10 DIAGNOSIS — R91.1 LUNG NODULE SEEN ON IMAGING STUDY: ICD-10-CM

## 2023-08-10 DIAGNOSIS — D49.1 PULMONARY NEOPLASM: ICD-10-CM

## 2023-08-10 PROCEDURE — 71250 CT THORAX DX C-: CPT

## 2023-08-10 RX ORDER — LEVOTHYROXINE SODIUM 0.1 MG/1
100 TABLET ORAL
Qty: 90 TABLET | Refills: 1 | Status: SHIPPED | OUTPATIENT
Start: 2023-08-10 | End: 2024-01-12

## 2023-08-11 NOTE — TELEPHONE ENCOUNTER
Called patient to discuss results of CT scan concerning for pulmonary malignancy. Will place referral for pulmonology. Patient also requesting refill of synthroid, orders placed.

## 2023-08-15 ENCOUNTER — OFFICE VISIT (OUTPATIENT)
Dept: INTERNAL MEDICINE | Facility: OTHER | Age: 83
End: 2023-08-15
Payer: MEDICARE

## 2023-08-15 VITALS
OXYGEN SATURATION: 97 % | HEART RATE: 61 BPM | DIASTOLIC BLOOD PRESSURE: 86 MMHG | WEIGHT: 95.8 LBS | HEIGHT: 61 IN | SYSTOLIC BLOOD PRESSURE: 142 MMHG | TEMPERATURE: 97.8 F | BODY MASS INDEX: 18.09 KG/M2

## 2023-08-15 DIAGNOSIS — M81.0 AGE-RELATED OSTEOPOROSIS WITHOUT CURRENT PATHOLOGICAL FRACTURE: ICD-10-CM

## 2023-08-15 DIAGNOSIS — M54.50 ACUTE RIGHT-SIDED LOW BACK PAIN WITHOUT SCIATICA: ICD-10-CM

## 2023-08-15 DIAGNOSIS — R03.0 ELEVATED BLOOD PRESSURE READING: ICD-10-CM

## 2023-08-15 DIAGNOSIS — R05.3 CHRONIC COUGH: ICD-10-CM

## 2023-08-15 PROBLEM — M25.549 HAND JOINT PAIN: Status: RESOLVED | Noted: 2020-02-28 | Resolved: 2023-08-15

## 2023-08-15 PROBLEM — Z78.0 MENOPAUSE: Status: RESOLVED | Noted: 2020-08-07 | Resolved: 2023-08-15

## 2023-08-15 PROBLEM — Z63.8 CAREGIVER ROLE STRAIN: Status: RESOLVED | Noted: 2020-02-28 | Resolved: 2023-08-15

## 2023-08-15 PROCEDURE — 3079F DIAST BP 80-89 MM HG: CPT | Performed by: STUDENT IN AN ORGANIZED HEALTH CARE EDUCATION/TRAINING PROGRAM

## 2023-08-15 PROCEDURE — 99213 OFFICE O/P EST LOW 20 MIN: CPT | Mod: GE | Performed by: STUDENT IN AN ORGANIZED HEALTH CARE EDUCATION/TRAINING PROGRAM

## 2023-08-15 PROCEDURE — 3077F SYST BP >= 140 MM HG: CPT | Performed by: STUDENT IN AN ORGANIZED HEALTH CARE EDUCATION/TRAINING PROGRAM

## 2023-08-15 RX ORDER — FLUTICASONE PROPIONATE 50 MCG
1 SPRAY, SUSPENSION (ML) NASAL DAILY
Qty: 16 G | Refills: 1 | Status: SHIPPED | OUTPATIENT
Start: 2023-08-15 | End: 2023-09-11

## 2023-08-15 ASSESSMENT — ENCOUNTER SYMPTOMS
HEADACHES: 0
BLURRED VISION: 0
VOMITING: 0
PALPITATIONS: 0
SHORTNESS OF BREATH: 0
DOUBLE VISION: 0
NAUSEA: 0
COUGH: 1
CHILLS: 0
FEVER: 0
DIZZINESS: 0
BACK PAIN: 1
WHEEZING: 0
HEARTBURN: 0

## 2023-08-15 ASSESSMENT — FIBROSIS 4 INDEX: FIB4 SCORE: 1.18

## 2023-08-15 NOTE — PROGRESS NOTES
Teaching Physician Attestation      Level of Participation    I discussed with the resident physician the patient's history, exam, assessment and plan in detail.  Topics listed in my addendum were the focus of the visit.  Healthcare maintenance was not addressed this visit unless listed as a topic in my addendum.  I agree with plan as written along with the following additions/modifications:      Chronic cough possibly secondary to postnasal drip versus borderline COPD  -CT with with emphysematous changes, PFTs borderline ratio, also has seasonal allergies  -Plan to begin Shelly pot and/or Flonase, follow-up in 1 month.  Trial of PPI did not improve symptoms.  -pulm referral pending for spiculated mass eval, appreciate support.  Pulm hemorrhage precautions given.      Lbp  -Clear onset with bending over/mechanical event, improving, has home .  No alarm symptoms although does have recent diagnosis of possible lung cancer.  Given it is improving, will continue with home PT and conservative care, follow-up in 1 month.  If has not resolved would consider imaging to ensure not metastatic disease    Osteoporosis  -Continue calcium to 1200 mg daily, vitamin D 1000 units daily, Prolia.    Elevated bp in setting of pain - monitor.

## 2023-08-15 NOTE — PATIENT INSTRUCTIONS
Thank you for coming in today, please follow-up with me in 5-6 weeks  Please follow-up with your pulmonologist as scheduled  Please bring your blood pressure machine with you at your next visit  Please call our clinic if you have any future questions or concerns, or if you'd like to be seen sooner for any reason

## 2023-08-16 NOTE — PROCEDURES
Chief Complaint   Patient presents with    Follow-Up     Ct scan results, pulmonary function test        HISTORY OF PRESENT ILLNESS: Patient is a 82 y.o. female established patient who presents today for the following.      1. Chronic cough  Patient reports ongoing chronic cough.  States she trialed the PPI previously prescribed to her, unfortunately did not notice any improvement in symptomatology.  Chest x-ray, CT, PFT findings discussed with patient previously via telephone, and patient will follow-up with pulmonology for evaluation of possible pulmonary neoplasm.    Patient does however report concern for possible postnasal drip.  Patient states that she has noticed a tremendous amount of pollen outside of her home the past few weeks and exacerbation of her allergies.    2. Acute right-sided low back pain without sciatica  Patient states that last week while attempting to put on her socks, she noticed a twinge in her back and subsequent back pain.  She describes the pain as right-sided in her low back, worse with movement and improved with rest and stretching.  She denies any bowel/bladder incontinence, saddle anesthesia.  She currently works with a  and has been continuing stretches to improve her overall pain and mobility.    3. Elevated blood pressure reading  Patient noted to have blood pressure of 148/80 with repeat 142/86.  Patient denies any chest pain, headache, shortness of breath.        Past Medical History:   Diagnosis Date    Basal cell adenocarcinoma     Squamous cell skin cancer        Patient Active Problem List    Diagnosis Date Noted    Alcohol use 08/01/2022    Macrocytosis 07/02/2021    Leukocytosis 07/02/2021    Advance directive discussed with patient 02/09/2021    Age-related osteoporosis without current pathological fracture 12/22/2020    Osteoporosis 02/28/2020    Vitamin D deficiency, unspecified 02/28/2020    Tobacco use 02/28/2020    Osteoarthritis, generalized  02/28/2020    Neuropathy 02/28/2020    Hypothyroidism 02/28/2020       Allergies:Patient has no known allergies.    Current Outpatient Medications   Medication Sig Dispense Refill    fluticasone (FLONASE) 50 MCG/ACT nasal spray Administer 1 Spray into affected nostril(S) every day. 16 g 1    Cyanocobalamin (VITAMIN B 12 PO) Take  by mouth.      valACYclovir (VALTREX) 500 MG Tab Take 1 Tablet by mouth 2 times a day as needed (severe cold sore). 30 Tablet 0    ibuprofen (MOTRIN) 200 MG Tab Take 200 mg by mouth every 6 hours as needed.      denosumab (PROLIA) 60 MG/ML Solution Prefilled Syringe injection PROLIA 60 MG/ML SOSY 1 mL 2    vitamin D3 (CHOLECALCIFEROL) 1000 Unit (25 mcg) Tab Take 1,000 Units by mouth every day.      zinc sulfate (ZINCATE) 220 (50 Zn) MG Cap Take 220 mg by mouth every day.      levothyroxine (SYNTHROID) 100 MCG Tab Take 1 Tablet by mouth every morning on an empty stomach. 90 Tablet 1     No current facility-administered medications for this visit.       Social History     Tobacco Use    Smoking status: Every Day     Packs/day: 0.50     Types: Cigarettes    Smokeless tobacco: Never   Vaping Use    Vaping Use: Never used   Substance Use Topics    Alcohol use: Yes     Alcohol/week: 8.4 oz     Types: 14 Glasses of wine per week    Drug use: No       No family history on file.      Review of Systems   Review of Systems   Constitutional:  Negative for chills and fever.   Eyes:  Negative for blurred vision and double vision.   Respiratory:  Positive for cough. Negative for shortness of breath and wheezing.    Cardiovascular:  Negative for chest pain and palpitations.   Gastrointestinal:  Negative for heartburn, nausea and vomiting.   Genitourinary:  Negative for dysuria.   Musculoskeletal:  Positive for back pain.   Neurological:  Negative for dizziness and headaches.       Exam:  BP (!) 148/80 (BP Location: Left arm, Patient Position: Sitting, BP Cuff Size: Small adult)   Pulse 61   Temp 36.6 °C  "(97.8 °F) (Temporal)   Ht 1.537 m (5' 0.5\")   Wt 43.5 kg (95 lb 12.8 oz)   SpO2 97%  Body mass index is 18.4 kg/m².    Constitutional:  Not in acute distress, well appearing.  HEENT:   NC/AT  Cardiovascular: Regular rate and rhythm. No murmurs or gallops.      Lungs:   Clear to auscultation bilaterally. No wheezes or crackles. No respiratory distress.  Abdomen: Not distended, soft, not tender. No guarding or rigidity. No masses.  Extremities:  No cyanosis/clubbing/edema. No obvious deformities.  Skin:  Warm and dry.  No visible rashes.  Neurologic: Alert & oriented x 3, CN II-XII grossly intact, strength and sensation grossly intact.  No focal deficits noted.  Psychiatric:  Affect normal, mood normal, judgment normal.    Assessment/Plan:     1. Chronic cough  Suspect possibly secondary to postnasal drip.  CT with findings of emphysematous changes and PFTs with borderline COPD.  We will trial patient on fluticasone as below  - fluticasone (FLONASE) 50 MCG/ACT nasal spray; Administer 1 Spray into affected nostril(S) every day.  Dispense: 16 g; Refill: 1    2. Acute right-sided low back pain without sciatica  Suspect musculoskeletal nature, no red flag signs on exam or history.  - Discussed with patient consideration for physical therapy, patient states she previously followed with physical therapy and continues to have access to stretches and utilizes the stretches with assistance from her .  - Encouraged continued conservative management with Tylenol ibuprofen as needed, patient respectfully declines utilization of muscle relaxers at this time.    3. Elevated blood pressure reading  Patient without history of hypertension, suspect elevated blood pressure readings likely secondary to pain as discussed above.  - We will continue to monitor blood pressure at future encounters    4. Age-related osteoporosis without current pathological fracture  Patient without history of above, encourage patient to " continue utilizing vitamin D and calcium supplements in addition to ongoing Prolia infusions.      All imaging results and lab results and consult notes are reviewed at this visit.  Followup: Return in about 5 weeks (around 9/19/2023).    Please note that this dictation was created using voice recognition software. I have made every reasonable attempt to correct obvious errors, but I expect that there are errors of grammar and possibly content that I did not discover before finalizing the note.    Jonny Mondragon MD  PGY-3  Internal Medicine

## 2023-08-20 ASSESSMENT — ENCOUNTER SYMPTOMS
DYSPNEA AT REST: 0
CHEST TIGHTNESS: 1
RESPIRATORY SYMPTOMS COMMENTS: NO
SHORTNESS OF BREATH: 0
WHEEZING: 0
HEMOPTYSIS: 0

## 2023-08-25 NOTE — PROGRESS NOTES
Chief Complaint   Patient presents with    Follow-Up       Ct scan results, pulmonary function test          HISTORY OF PRESENT ILLNESS: Patient is a 82 y.o. female established patient who presents today for the following.       1. Chronic cough  Patient reports ongoing chronic cough.  States she trialed the PPI previously prescribed to her, unfortunately did not notice any improvement in symptomatology.  Chest x-ray, CT, PFT findings discussed with patient previously via telephone, and patient will follow-up with pulmonology for evaluation of possible pulmonary neoplasm.     Patient does however report concern for possible postnasal drip.  Patient states that she has noticed a tremendous amount of pollen outside of her home the past few weeks and exacerbation of her allergies.     2. Acute right-sided low back pain without sciatica  Patient states that last week while attempting to put on her socks, she noticed a twinge in her back and subsequent back pain.  She describes the pain as right-sided in her low back, worse with movement and improved with rest and stretching.  She denies any bowel/bladder incontinence, saddle anesthesia.  She currently works with a  and has been continuing stretches to improve her overall pain and mobility.     3. Elevated blood pressure reading  Patient noted to have blood pressure of 148/80 with repeat 142/86.  Patient denies any chest pain, headache, shortness of breath.           Past Medical History        Past Medical History:   Diagnosis Date    Basal cell adenocarcinoma      Squamous cell skin cancer                   Patient Active Problem List     Diagnosis Date Noted    Alcohol use 08/01/2022    Macrocytosis 07/02/2021    Leukocytosis 07/02/2021    Advance directive discussed with patient 02/09/2021    Age-related osteoporosis without current pathological fracture 12/22/2020    Osteoporosis 02/28/2020    Vitamin D deficiency, unspecified 02/28/2020    Tobacco  use 02/28/2020    Osteoarthritis, generalized 02/28/2020    Neuropathy 02/28/2020    Hypothyroidism 02/28/2020         Allergies:Patient has no known allergies.     Current Medications          Current Outpatient Medications   Medication Sig Dispense Refill    fluticasone (FLONASE) 50 MCG/ACT nasal spray Administer 1 Spray into affected nostril(S) every day. 16 g 1    Cyanocobalamin (VITAMIN B 12 PO) Take  by mouth.        valACYclovir (VALTREX) 500 MG Tab Take 1 Tablet by mouth 2 times a day as needed (severe cold sore). 30 Tablet 0    ibuprofen (MOTRIN) 200 MG Tab Take 200 mg by mouth every 6 hours as needed.        denosumab (PROLIA) 60 MG/ML Solution Prefilled Syringe injection PROLIA 60 MG/ML SOSY 1 mL 2    vitamin D3 (CHOLECALCIFEROL) 1000 Unit (25 mcg) Tab Take 1,000 Units by mouth every day.        zinc sulfate (ZINCATE) 220 (50 Zn) MG Cap Take 220 mg by mouth every day.        levothyroxine (SYNTHROID) 100 MCG Tab Take 1 Tablet by mouth every morning on an empty stomach. 90 Tablet 1      No current facility-administered medications for this visit.            Social History            Tobacco Use    Smoking status: Every Day       Packs/day: 0.50       Types: Cigarettes    Smokeless tobacco: Never   Vaping Use    Vaping Use: Never used   Substance Use Topics    Alcohol use: Yes       Alcohol/week: 8.4 oz       Types: 14 Glasses of wine per week    Drug use: No         Family History   No family history on file.           Review of Systems   Review of Systems   Constitutional:  Negative for chills and fever.   Eyes:  Negative for blurred vision and double vision.   Respiratory:  Positive for cough. Negative for shortness of breath and wheezing.    Cardiovascular:  Negative for chest pain and palpitations.   Gastrointestinal:  Negative for heartburn, nausea and vomiting.   Genitourinary:  Negative for dysuria.   Musculoskeletal:  Positive for back pain.   Neurological:  Negative for dizziness and headaches.     "     Exam:  BP (!) 148/80 (BP Location: Left arm, Patient Position: Sitting, BP Cuff Size: Small adult)   Pulse 61   Temp 36.6 °C (97.8 °F) (Temporal)   Ht 1.537 m (5' 0.5\")   Wt 43.5 kg (95 lb 12.8 oz)   SpO2 97%  Body mass index is 18.4 kg/m².     Constitutional:  Not in acute distress, well appearing.  HEENT:   NC/AT  Cardiovascular: Regular rate and rhythm. No murmurs or gallops.      Lungs:   Clear to auscultation bilaterally. No wheezes or crackles. No respiratory distress.  Abdomen: Not distended, soft, not tender. No guarding or rigidity. No masses.  Extremities:  No cyanosis/clubbing/edema. No obvious deformities.  Skin:  Warm and dry.  No visible rashes.  Neurologic: Alert & oriented x 3, CN II-XII grossly intact, strength and sensation grossly intact.  No focal deficits noted.  Psychiatric:  Affect normal, mood normal, judgment normal.     Assessment/Plan:      1. Chronic cough  Suspect possibly secondary to postnasal drip.  CT with findings of emphysematous changes and PFTs with borderline COPD.  We will trial patient on fluticasone as below  - fluticasone (FLONASE) 50 MCG/ACT nasal spray; Administer 1 Spray into affected nostril(S) every day.  Dispense: 16 g; Refill: 1     2. Acute right-sided low back pain without sciatica  Suspect musculoskeletal nature, no red flag signs on exam or history.  - Discussed with patient consideration for physical therapy, patient states she previously followed with physical therapy and continues to have access to stretches and utilizes the stretches with assistance from her .  - Encouraged continued conservative management with Tylenol ibuprofen as needed, patient respectfully declines utilization of muscle relaxers at this time.     3. Elevated blood pressure reading  Patient without history of hypertension, suspect elevated blood pressure readings likely secondary to pain as discussed above.  - We will continue to monitor blood pressure at future " encounters     4. Age-related osteoporosis without current pathological fracture  Patient without history of above, encourage patient to continue utilizing vitamin D and calcium supplements in addition to ongoing Prolia infusions.        All imaging results and lab results and consult notes are reviewed at this visit.  Followup: Return in about 5 weeks (around 9/19/2023).     Please note that this dictation was created using voice recognition software. I have made every reasonable attempt to correct obvious errors, but I expect that there are errors of grammar and possibly content that I did not discover before finalizing the note.     Jonny Mondragon MD  PGY-3  Internal Medicine

## 2023-08-31 ENCOUNTER — OFFICE VISIT (OUTPATIENT)
Dept: SLEEP MEDICINE | Facility: MEDICAL CENTER | Age: 83
End: 2023-08-31
Attending: INTERNAL MEDICINE
Payer: MEDICARE

## 2023-08-31 VITALS
SYSTOLIC BLOOD PRESSURE: 134 MMHG | OXYGEN SATURATION: 95 % | WEIGHT: 94 LBS | BODY MASS INDEX: 17.75 KG/M2 | HEIGHT: 61 IN | HEART RATE: 68 BPM | DIASTOLIC BLOOD PRESSURE: 78 MMHG

## 2023-08-31 DIAGNOSIS — Z72.0 TOBACCO USE: ICD-10-CM

## 2023-08-31 DIAGNOSIS — Z28.21 IMMUNIZATION DECLINED: ICD-10-CM

## 2023-08-31 DIAGNOSIS — R91.8 RIGHT LOWER LOBE LUNG MASS: ICD-10-CM

## 2023-08-31 PROCEDURE — 3075F SYST BP GE 130 - 139MM HG: CPT | Performed by: INTERNAL MEDICINE

## 2023-08-31 PROCEDURE — 3078F DIAST BP <80 MM HG: CPT | Performed by: INTERNAL MEDICINE

## 2023-08-31 PROCEDURE — 99213 OFFICE O/P EST LOW 20 MIN: CPT | Performed by: INTERNAL MEDICINE

## 2023-08-31 PROCEDURE — 99205 OFFICE O/P NEW HI 60 MIN: CPT | Performed by: INTERNAL MEDICINE

## 2023-08-31 ASSESSMENT — FIBROSIS 4 INDEX: FIB4 SCORE: 1.18

## 2023-08-31 NOTE — PROGRESS NOTES
Pulmonary Consult    Date of Initial Consult: 8/31/23    Reason for consult: Active smoker, spiculated mass RLL superior segment, Asthma-COPD overlap syndrome    Chief Complaint:  Chief Complaint   Patient presents with    Establish Care     Referred by DR Mondragon for Lung Nodule/Pulmonary Neoplasm    Other     CT-Chest 08/10/23, PFT 07/27/23     HPI:   Ruby Buckner is a very pleasant 82 y.o. female     Answers submitted by the patient for this visit:  Pulmonary History Questionnaire (Submitted on 8/20/2023)  What is the reason for your visit today?: Cough and slight shortness of breath  Do you cough first thing in the morning or at other times during the day?: Usually at night  Do you have a cough on most days? If so, how long have you had this cough?: Off and on  Bring up phlegm (mucus, sputum) in the morning or other times during the day?: During night  If so, how long have you produced phlegm (Months, Years), and what is the usual color of your phlegm?: 2 months clear  Do you cough up blood from your chest?: No  Do you experience wheezing?: No  Do you experience any chest tightness?: Yes  How often?: constant  Experience shortness of breath?: No  Experience shortness of breath at rest?: No  How far can you walk before becoming short of breath or need to rest? : Normally  Please list what causes you to become short of breath:: My work outs w/  Have you ever been hospitalized?: Yes  Reason, year, and hospital in which you were hospitalized:: 2017 [facility] for hip replacement  Have you ever needed to be intubated or placed on a ventilator? : No  Have you ever had problems with anesthesia?: No  Have you experienced post-operative delirium?: No  Any complications with surgery?: No  What year did you receive your last Flu shot?: c 2023  What year did you receive you last Pneumonia shot?: c 2023  Have you had a TB skin test? If so, please list the year and result:: Not recently  Have you had Allergy  skin testing? If so, please list the year and result:: No  Please list the places you have lived, the year, and Country or State in the [place]:: [place] always except for brief time in [place] [id number]  Birds?: No  Dogs?: No  Cats?: No  Mice and/or Deer Mice?: No  Reptiles?: No  Blood Chemistries: 2023 [facility]  Blood Count: 2023 [facility]  Cardiac Ultrasound: 2023 [facility]  Chest X-ray: 2023 [facility]  Pulmonary Function Test: 2023 [facility]  CT Scan, Sinus: No  Electrocardiogram: No  Sleep Study: No  Coffee: 1 to 2  Decaffeinated coffee: No  Energy drinks: No  Tea: Hibiscus tea 1 cup/day  Carbonated soft drinks: No    Patient has not been seen by pulmonary at [facility] in the past.  she has prior PFTs    She continues to smoke. She is here today to review recent testing. She is very active and exercises regularly. Animal dander and pollens do bother her breathing. She has no pets.     Past Medical History:   Diagnosis Date    Back pain     Basal cell adenocarcinoma     Chickenpox     Cough     Hearing difficulty     Hypothyroidism     Osteoporosis     Painful joint     Right lower lobe lung mass 8/31/2023    Superior segment. >3 cm. Spiculated.     Shortness of breath     Sputum production     Squamous cell skin cancer     Tobacco use 2/28/2020    Wears glasses        Past Surgical History:   Procedure Laterality Date    HIP REPLACEMENT, TOTAL  2017    ROTATOR CUFF REPAIR  1990    three surgeries    ABDOMINAL HYSTERECTOMY TOTAL Bilateral     CATARACT EXTRACTION WITH IOL      TONSILLECTOMY         Social History     Socioeconomic History    Marital status: Single     Spouse name: Not on file    Number of children: Not on file    Years of education: Not on file    Highest education level: Bachelor's degree (e.g., BA, AB, BS)   Occupational History    Not on file   Tobacco Use    Smoking status: Every Day     Current packs/day: 0.50     Types: Cigarettes    Smokeless tobacco: Never   Vaping Use    Vaping Use: Never used   Substance and  Sexual Activity    Alcohol use: Yes     Alcohol/week: 8.4 oz     Types: 14 Glasses of wine per week    Drug use: No    Sexual activity: Not on file   Other Topics Concern    Not on file   Social History Narrative    Not on file     Social Determinants of Health     Financial Resource Strain: Low Risk  (4/13/2022)    Overall Financial Resource Strain (CARDIA)     Difficulty of Paying Living Expenses: Not hard at all   Food Insecurity: No Food Insecurity (4/13/2023)    Hunger Vital Sign     Worried About Running Out of Food in the Last Year: Never true     Ran Out of Food in the Last Year: Never true   Transportation Needs: No Transportation Needs (4/13/2023)    PRAPARE - Transportation     Lack of Transportation (Medical): No     Lack of Transportation (Non-Medical): No   Physical Activity: Sufficiently Active (4/13/2023)    Exercise Vital Sign     Days of Exercise per Week: 3 days     Minutes of Exercise per Session: 50 min   Stress: No Stress Concern Present (4/13/2023)    [employer] - Occupational Stress Questionnaire     Feeling of Stress : Not at all   Social Connections: Socially Isolated (4/13/2023)    Social Connection and Isolation Panel [NHANES]     Frequency of Communication with Friends and Family: More than three times a week     Frequency of Social Gatherings with Friends and Family: More than three times a week     Attends Spiritism Services: Never     Active Member of Clubs or Organizations: No     Attends Club or Organization Meetings: Never     Marital Status:    Intimate Partner Violence: Not on file   Housing Stability: Low Risk  (4/13/2023)    Housing Stability Vital Sign     Unable to Pay for Housing in the Last Year: No     Number of Places Lived in the Last Year: 1     Unstable Housing in the Last Year: No          No family history on file.    Current Outpatient Medications on File Prior to Visit   Medication Sig Dispense Refill    fluticasone (FLONASE)  "50 MCG/ACT nasal spray Administer 1 Spray into affected nostril(S) every day. 16 g 1    levothyroxine (SYNTHROID) 100 MCG Tab Take 1 Tablet by mouth every morning on an empty stomach. 90 Tablet 1    Cyanocobalamin (VITAMIN B 12 PO) Take  by mouth.      valACYclovir (VALTREX) 500 MG Tab Take 1 Tablet by mouth 2 times a day as needed (severe cold sore). 30 Tablet 0    ibuprofen (MOTRIN) 200 MG Tab Take 200 mg by mouth every 6 hours as needed.      denosumab (PROLIA) 60 MG/ML Solution Prefilled Syringe injection PROLIA 60 MG/ML SOSY 1 mL 2    vitamin D3 (CHOLECALCIFEROL) 1000 Unit (25 mcg) Tab Take 1,000 Units by mouth every day.      zinc sulfate (ZINCATE) 220 (50 Zn) MG Cap Take 220 mg by mouth every day.       No current facility-administered medications on file prior to visit.       Allergies: Patient has no known allergies.      ROS: A 12 point ROS was performed on intake and during my interview. ROS negative unless specifically noted in HPI.     Vitals:  /78 (BP Location: Left arm, Patient Position: Sitting, BP Cuff Size: Adult)   Pulse 68   Ht 1.537 m (5' 0.5\")   Wt 42.6 kg (94 lb)   SpO2 95%     Wt Readings from Last 3 Encounters:   08/15/23 43.5 kg (95 lb 12.8 oz)   07/27/23 43 kg (94 lb 11.2 oz)   07/11/23 43 kg (94 lb 12.8 oz)       Physical Exam:  Physical Exam  Vitals reviewed. Exam conducted with a chaperone present.   Constitutional:       General: She is not in acute distress.     Appearance: Normal appearance. She is not ill-appearing, toxic-appearing or diaphoretic.   Eyes:      General:         Right eye: No discharge.         Left eye: No discharge.      Conjunctiva/sclera: Conjunctivae normal.   Cardiovascular:      Rate and Rhythm: Normal rate and regular rhythm.   Pulmonary:      Effort: Pulmonary effort is normal.      Breath sounds: Normal breath sounds.   Musculoskeletal:      Right lower leg: No edema.      Left lower leg: No edema.   Skin:     General: Skin is warm.      Capillary " Refill: Capillary refill takes less than 2 seconds.      Coloration: Skin is not jaundiced.   Neurological:      General: No focal deficit present.      Mental Status: She is alert.   Psychiatric:         Behavior: Behavior normal.         Laboratory Data: I personally reviewed labs including historical lab trends.     Immunization History   Administered Date(s) Administered    Influenza Vaccine Adult HD 10/13/2015, 09/15/2016, 08/31/2017, 09/14/2018, 09/11/2019    Influenza, Unspecified - HISTORICAL DATA 09/28/2020    Pneumococcal Conjugate Vaccine (PCV20) 04/14/2023       PFTs as reviewed by me personally show: Moderate obstruction, +BDR, air trapping, reduced DLCO      Imaging as reviewed by me personally show:  spiculated mass RLL superior segment, severe emphysema upper lobes, dilated esophagus       Assessment/Plan:    Pulmonary problem list:  #Lung mass   #Tobacco abuse - active  #Asthma-COPD overlap syndrome  #Declines vaccines    Plan:  -extensive discussion surrounding CT scan. Highly concerning for malignancy. She is undecided on ion robotic bronchoschopy vs. IR biopsy vs. PET/CT first to try to identify another target. We went over these options including the upside/downside to each. She will discuss further with her family. I did place referrals for PET/CT and bronchoscopy in the meantime.  -discussed smoking cessation  -discussed her asthma/COPD diagnosis - no inhalers at this time  -discussed Influenza and COVID vaccines and that she is a high risk patient for respiratory failure and death.     Total consult time was 65 minutes. This includes reviewing previous provider notes, personally reviewing previous imaging and spirometry, educating the patient about their disease process, and inhaler education.   __________  Andrez Michael, DO  Pulmonary and Critical Care Medicine  [facility]    Please note that this dictation was created using voice recognition software. The accuracy of the dictation is  limited to the abilities of the software. I have made every reasonable attempt to correct obvious errors, but I expect that there are errors of grammar and possibly content that I did not discover before finalizing the note.

## 2023-08-31 NOTE — PATIENT INSTRUCTIONS
Your options for the mass in your lung are:  Interventional radiology doing a needle biopsy.    Upside: only local anesthetic used, no general anesthesia   Downside: 1 in 4 risk of a leak in the lung (aka pneumothorax)  2.   Robotic bronchoscopy   Upside: high precision, very low risk (<1%) of lung leak (pneumothorax)   Downside: general anesthesia  3.   PET-CT scan first   Upside: you may find an easier target to biopsy    Downside: if there is not an easier target, we are back to the other options.   4.   Surgical resection   Downside: not likely to happen because of the proximity to the spine. You would also need general anesthesia and this is the most invasive option.

## 2023-09-01 ENCOUNTER — APPOINTMENT (OUTPATIENT)
Dept: ADMISSIONS | Facility: MEDICAL CENTER | Age: 83
End: 2023-09-01
Attending: INTERNAL MEDICINE
Payer: MEDICARE

## 2023-09-01 PROBLEM — Z28.21 IMMUNIZATION DECLINED: Status: ACTIVE | Noted: 2023-09-01

## 2023-09-06 ENCOUNTER — PRE-ADMISSION TESTING (OUTPATIENT)
Dept: ADMISSIONS | Facility: MEDICAL CENTER | Age: 83
End: 2023-09-06
Attending: INTERNAL MEDICINE
Payer: MEDICARE

## 2023-09-06 DIAGNOSIS — Z01.810 PRE-OPERATIVE CARDIOVASCULAR EXAMINATION: ICD-10-CM

## 2023-09-06 DIAGNOSIS — Z01.812 PRE-OPERATIVE LABORATORY EXAMINATION: ICD-10-CM

## 2023-09-06 RX ORDER — AMOXICILLIN 500 MG/1
500 CAPSULE ORAL
COMMUNITY
End: 2023-12-12

## 2023-09-06 NOTE — PREPROCEDURE INSTRUCTIONS
PreAdmit Telephone Appointment: Reviewed the Preparing for your procedure handout with patient over the phone. Patient instructed per pharmacy guidelines regarding taking or holding regularly prescribed medications before surgery. Instructed to take the following medications the day of surgery with a sip of water per pharmacy medication guidelines: Levothyroxine  Patient instructed to notify physician of any illness prior to surgery. Pt reports unable to spinal anesthesia due to degenerative bone disease.

## 2023-09-11 DIAGNOSIS — R05.3 CHRONIC COUGH: ICD-10-CM

## 2023-09-11 RX ORDER — FLUTICASONE PROPIONATE 50 MCG
SPRAY, SUSPENSION (ML) NASAL
Qty: 16 ML | Refills: 1 | Status: SHIPPED | OUTPATIENT
Start: 2023-09-11 | End: 2023-09-18

## 2023-09-13 ENCOUNTER — PRE-ADMISSION TESTING (OUTPATIENT)
Dept: ADMISSIONS | Facility: MEDICAL CENTER | Age: 83
End: 2023-09-13
Attending: INTERNAL MEDICINE
Payer: MEDICARE

## 2023-09-13 DIAGNOSIS — Z01.812 PRE-OPERATIVE LABORATORY EXAMINATION: ICD-10-CM

## 2023-09-13 DIAGNOSIS — Z01.810 PRE-OPERATIVE CARDIOVASCULAR EXAMINATION: ICD-10-CM

## 2023-09-13 LAB
ALBUMIN SERPL BCP-MCNC: 4.3 G/DL (ref 3.2–4.9)
ALBUMIN/GLOB SERPL: 2 G/DL
ALP SERPL-CCNC: 78 U/L (ref 30–99)
ALT SERPL-CCNC: 17 U/L (ref 2–50)
ANION GAP SERPL CALC-SCNC: 12 MMOL/L (ref 7–16)
AST SERPL-CCNC: 21 U/L (ref 12–45)
BILIRUB SERPL-MCNC: 0.7 MG/DL (ref 0.1–1.5)
BUN SERPL-MCNC: 12 MG/DL (ref 8–22)
CALCIUM ALBUM COR SERPL-MCNC: 8.7 MG/DL (ref 8.5–10.5)
CALCIUM SERPL-MCNC: 8.9 MG/DL (ref 8.4–10.2)
CHLORIDE SERPL-SCNC: 92 MMOL/L (ref 96–112)
CO2 SERPL-SCNC: 24 MMOL/L (ref 20–33)
CREAT SERPL-MCNC: 0.51 MG/DL (ref 0.5–1.4)
ERYTHROCYTE [DISTWIDTH] IN BLOOD BY AUTOMATED COUNT: 46.2 FL (ref 35.9–50)
GFR SERPLBLD CREATININE-BSD FMLA CKD-EPI: 94 ML/MIN/1.73 M 2
GLOBULIN SER CALC-MCNC: 2.2 G/DL (ref 1.9–3.5)
GLUCOSE SERPL-MCNC: 98 MG/DL (ref 65–99)
HCT VFR BLD AUTO: 39.1 % (ref 37–47)
HGB BLD-MCNC: 13.7 G/DL (ref 12–16)
MCH RBC QN AUTO: 35.9 PG (ref 27–33)
MCHC RBC AUTO-ENTMCNC: 35 G/DL (ref 32.2–35.5)
MCV RBC AUTO: 102.4 FL (ref 81.4–97.8)
PLATELET # BLD AUTO: 267 K/UL (ref 164–446)
PMV BLD AUTO: 9.8 FL (ref 9–12.9)
POTASSIUM SERPL-SCNC: 4.5 MMOL/L (ref 3.6–5.5)
PROT SERPL-MCNC: 6.5 G/DL (ref 6–8.2)
RBC # BLD AUTO: 3.82 M/UL (ref 4.2–5.4)
SODIUM SERPL-SCNC: 128 MMOL/L (ref 135–145)
WBC # BLD AUTO: 8.8 K/UL (ref 4.8–10.8)

## 2023-09-13 PROCEDURE — 93005 ELECTROCARDIOGRAM TRACING: CPT

## 2023-09-13 PROCEDURE — 85027 COMPLETE CBC AUTOMATED: CPT

## 2023-09-13 PROCEDURE — 80053 COMPREHEN METABOLIC PANEL: CPT

## 2023-09-13 PROCEDURE — 36415 COLL VENOUS BLD VENIPUNCTURE: CPT

## 2023-09-14 LAB — EKG IMPRESSION: NORMAL

## 2023-09-14 PROCEDURE — 93010 ELECTROCARDIOGRAM REPORT: CPT | Performed by: INTERNAL MEDICINE

## 2023-09-16 ENCOUNTER — OUTPATIENT INFUSION SERVICES (OUTPATIENT)
Dept: ONCOLOGY | Facility: MEDICAL CENTER | Age: 83
End: 2023-09-16
Attending: STUDENT IN AN ORGANIZED HEALTH CARE EDUCATION/TRAINING PROGRAM
Payer: MEDICARE

## 2023-09-16 VITALS
SYSTOLIC BLOOD PRESSURE: 138 MMHG | DIASTOLIC BLOOD PRESSURE: 77 MMHG | HEART RATE: 67 BPM | BODY MASS INDEX: 18.31 KG/M2 | WEIGHT: 93.25 LBS | OXYGEN SATURATION: 95 % | RESPIRATION RATE: 18 BRPM | TEMPERATURE: 97.2 F | HEIGHT: 60 IN

## 2023-09-16 DIAGNOSIS — R05.3 CHRONIC COUGH: ICD-10-CM

## 2023-09-16 DIAGNOSIS — M81.0 AGE-RELATED OSTEOPOROSIS WITHOUT CURRENT PATHOLOGICAL FRACTURE: ICD-10-CM

## 2023-09-16 LAB
CA-I BLD ISE-SCNC: 1.16 MMOL/L (ref 1.1–1.3)
CREAT BLD-MCNC: 0.5 MG/DL (ref 0.5–1.4)

## 2023-09-16 PROCEDURE — 36415 COLL VENOUS BLD VENIPUNCTURE: CPT

## 2023-09-16 PROCEDURE — 700111 HCHG RX REV CODE 636 W/ 250 OVERRIDE (IP): Mod: JZ,JG | Performed by: STUDENT IN AN ORGANIZED HEALTH CARE EDUCATION/TRAINING PROGRAM

## 2023-09-16 PROCEDURE — 82565 ASSAY OF CREATININE: CPT

## 2023-09-16 PROCEDURE — 96372 THER/PROPH/DIAG INJ SC/IM: CPT

## 2023-09-16 PROCEDURE — 82330 ASSAY OF CALCIUM: CPT

## 2023-09-16 RX ADMIN — DENOSUMAB 60 MG: 60 INJECTION SUBCUTANEOUS at 12:17

## 2023-09-16 ASSESSMENT — FIBROSIS 4 INDEX: FIB4 SCORE: 1.53

## 2023-09-16 NOTE — PROGRESS NOTES
Ruby came into infusion services today for prolia injection for osteoporosis. No complaints. Pt has not had dental work in the past 4 weeks and does not plan on it in the coming 4 weeks. Labs drawn from right forearm, covered with gauze and coban. Labs within parameters. Injection given in back of right arm SQ, covered with band-aid. Tolerated well. Emailing scheduling for future appointment. Discharged to self care.

## 2023-09-18 RX ORDER — FLUTICASONE PROPIONATE 50 MCG
1 SPRAY, SUSPENSION (ML) NASAL DAILY
Qty: 48 ML | Refills: 1 | Status: SHIPPED | OUTPATIENT
Start: 2023-09-18

## 2023-09-28 ENCOUNTER — HOSPITAL ENCOUNTER (OUTPATIENT)
Dept: RADIOLOGY | Facility: MEDICAL CENTER | Age: 83
End: 2023-09-28
Attending: INTERNAL MEDICINE
Payer: MEDICARE

## 2023-09-28 DIAGNOSIS — Z72.0 TOBACCO USE: ICD-10-CM

## 2023-09-28 DIAGNOSIS — R91.8 RIGHT LOWER LOBE LUNG MASS: ICD-10-CM

## 2023-09-28 PROCEDURE — A9552 F18 FDG: HCPCS

## 2023-10-02 ENCOUNTER — PATIENT MESSAGE (OUTPATIENT)
Dept: SLEEP MEDICINE | Facility: MEDICAL CENTER | Age: 83
End: 2023-10-02
Payer: MEDICARE

## 2023-10-02 DIAGNOSIS — R91.8 RIGHT LOWER LOBE LUNG MASS: ICD-10-CM

## 2023-10-04 NOTE — PROGRESS NOTES
PET with hypermetabolic RLL mass - plan for Ion navigational bronch w/biopsy on 10/10/23. Patient requesting referral to Dr Marco Lovelace at [facility] - will place referral now notified on referral about plan for bronch so they can schedule patient after she gets her biopsy done.        Helena Bacon MD  Pulmonary and Critical Care Medicine  [facility]

## 2023-10-10 ENCOUNTER — APPOINTMENT (OUTPATIENT)
Dept: RADIOLOGY | Facility: MEDICAL CENTER | Age: 83
End: 2023-10-10
Attending: INTERNAL MEDICINE
Payer: MEDICARE

## 2023-10-10 ENCOUNTER — HOSPITAL ENCOUNTER (OUTPATIENT)
Facility: MEDICAL CENTER | Age: 83
End: 2023-10-10
Attending: INTERNAL MEDICINE | Admitting: INTERNAL MEDICINE
Payer: MEDICARE

## 2023-10-10 ENCOUNTER — ANESTHESIA (OUTPATIENT)
Dept: SURGERY | Facility: MEDICAL CENTER | Age: 83
End: 2023-10-10
Payer: MEDICARE

## 2023-10-10 ENCOUNTER — ANESTHESIA EVENT (OUTPATIENT)
Dept: SURGERY | Facility: MEDICAL CENTER | Age: 83
End: 2023-10-10
Payer: MEDICARE

## 2023-10-10 VITALS
SYSTOLIC BLOOD PRESSURE: 125 MMHG | BODY MASS INDEX: 18.27 KG/M2 | HEIGHT: 60 IN | TEMPERATURE: 97.3 F | RESPIRATION RATE: 16 BRPM | WEIGHT: 93.03 LBS | HEART RATE: 84 BPM | OXYGEN SATURATION: 90 % | DIASTOLIC BLOOD PRESSURE: 70 MMHG

## 2023-10-10 DIAGNOSIS — R91.8 RIGHT LOWER LOBE LUNG MASS: ICD-10-CM

## 2023-10-10 LAB
PATHOLOGY CONSULT NOTE: NORMAL
PATHOLOGY CONSULT NOTE: NORMAL

## 2023-10-10 PROCEDURE — 31629 BRONCHOSCOPY/NEEDLE BX EACH: CPT | Performed by: INTERNAL MEDICINE

## 2023-10-10 PROCEDURE — 88332 PATH CONSLTJ SURG EA ADD BLK: CPT

## 2023-10-10 PROCEDURE — 87205 SMEAR GRAM STAIN: CPT

## 2023-10-10 PROCEDURE — 160035 HCHG PACU - 1ST 60 MINS PHASE I: Performed by: INTERNAL MEDICINE

## 2023-10-10 PROCEDURE — 71045 X-RAY EXAM CHEST 1 VIEW: CPT

## 2023-10-10 PROCEDURE — C1887 CATHETER, GUIDING: HCPCS | Performed by: INTERNAL MEDICINE

## 2023-10-10 PROCEDURE — 160002 HCHG RECOVERY MINUTES (STAT): Performed by: INTERNAL MEDICINE

## 2023-10-10 PROCEDURE — 88305 TISSUE EXAM BY PATHOLOGIST: CPT

## 2023-10-10 PROCEDURE — 160048 HCHG OR STATISTICAL LEVEL 1-5: Performed by: INTERNAL MEDICINE

## 2023-10-10 PROCEDURE — 88172 CYTP DX EVAL FNA 1ST EA SITE: CPT

## 2023-10-10 PROCEDURE — 88331 PATH CONSLTJ SURG 1 BLK 1SPC: CPT

## 2023-10-10 PROCEDURE — 160009 HCHG ANES TIME/MIN: Performed by: INTERNAL MEDICINE

## 2023-10-10 PROCEDURE — 88112 CYTOPATH CELL ENHANCE TECH: CPT | Mod: XU

## 2023-10-10 PROCEDURE — 71250 CT THORAX DX C-: CPT

## 2023-10-10 PROCEDURE — 31628 BRONCHOSCOPY/LUNG BX EACH: CPT | Performed by: INTERNAL MEDICINE

## 2023-10-10 PROCEDURE — 88342 IMHCHEM/IMCYTCHM 1ST ANTB: CPT

## 2023-10-10 PROCEDURE — 160046 HCHG PACU - 1ST 60 MINS PHASE II: Performed by: INTERNAL MEDICINE

## 2023-10-10 PROCEDURE — 160031 HCHG SURGERY MINUTES - 1ST 30 MINS LEVEL 5: Performed by: INTERNAL MEDICINE

## 2023-10-10 PROCEDURE — 700105 HCHG RX REV CODE 258: Performed by: INTERNAL MEDICINE

## 2023-10-10 PROCEDURE — 31627 NAVIGATIONAL BRONCHOSCOPY: CPT | Performed by: INTERNAL MEDICINE

## 2023-10-10 PROCEDURE — 700101 HCHG RX REV CODE 250: Performed by: ANESTHESIOLOGY

## 2023-10-10 PROCEDURE — 88341 IMHCHEM/IMCYTCHM EA ADD ANTB: CPT | Mod: 91

## 2023-10-10 PROCEDURE — 160042 HCHG SURGERY MINUTES - EA ADDL 1 MIN LEVEL 5: Performed by: INTERNAL MEDICINE

## 2023-10-10 PROCEDURE — 502714 HCHG ROBOTIC SURGERY SERVICES: Performed by: INTERNAL MEDICINE

## 2023-10-10 PROCEDURE — 88173 CYTOPATH EVAL FNA REPORT: CPT

## 2023-10-10 PROCEDURE — 31624 DX BRONCHOSCOPE/LAVAGE: CPT | Performed by: INTERNAL MEDICINE

## 2023-10-10 PROCEDURE — 87070 CULTURE OTHR SPECIMN AEROBIC: CPT

## 2023-10-10 PROCEDURE — 160025 RECOVERY II MINUTES (STATS): Performed by: INTERNAL MEDICINE

## 2023-10-10 PROCEDURE — 31654 BRONCH EBUS IVNTJ PERPH LES: CPT | Performed by: INTERNAL MEDICINE

## 2023-10-10 PROCEDURE — 700111 HCHG RX REV CODE 636 W/ 250 OVERRIDE (IP): Performed by: ANESTHESIOLOGY

## 2023-10-10 RX ORDER — OXYCODONE HCL 5 MG/5 ML
5 SOLUTION, ORAL ORAL
Status: DISCONTINUED | OUTPATIENT
Start: 2023-10-10 | End: 2023-10-10 | Stop reason: HOSPADM

## 2023-10-10 RX ORDER — DEXAMETHASONE SODIUM PHOSPHATE 4 MG/ML
INJECTION, SOLUTION INTRA-ARTICULAR; INTRALESIONAL; INTRAMUSCULAR; INTRAVENOUS; SOFT TISSUE PRN
Status: DISCONTINUED | OUTPATIENT
Start: 2023-10-10 | End: 2023-10-10 | Stop reason: SURG

## 2023-10-10 RX ORDER — DIPHENHYDRAMINE HYDROCHLORIDE 50 MG/ML
12.5 INJECTION INTRAMUSCULAR; INTRAVENOUS
Status: DISCONTINUED | OUTPATIENT
Start: 2023-10-10 | End: 2023-10-10 | Stop reason: HOSPADM

## 2023-10-10 RX ORDER — EPHEDRINE SULFATE 50 MG/ML
5 INJECTION, SOLUTION INTRAVENOUS
Status: DISCONTINUED | OUTPATIENT
Start: 2023-10-10 | End: 2023-10-10 | Stop reason: HOSPADM

## 2023-10-10 RX ORDER — HALOPERIDOL 5 MG/ML
1 INJECTION INTRAMUSCULAR
Status: DISCONTINUED | OUTPATIENT
Start: 2023-10-10 | End: 2023-10-10 | Stop reason: HOSPADM

## 2023-10-10 RX ORDER — LIDOCAINE HYDROCHLORIDE 20 MG/ML
INJECTION, SOLUTION EPIDURAL; INFILTRATION; INTRACAUDAL; PERINEURAL PRN
Status: DISCONTINUED | OUTPATIENT
Start: 2023-10-10 | End: 2023-10-10 | Stop reason: SURG

## 2023-10-10 RX ORDER — MEPERIDINE HYDROCHLORIDE 25 MG/ML
12.5 INJECTION INTRAMUSCULAR; INTRAVENOUS; SUBCUTANEOUS
Status: DISCONTINUED | OUTPATIENT
Start: 2023-10-10 | End: 2023-10-10 | Stop reason: HOSPADM

## 2023-10-10 RX ORDER — IPRATROPIUM BROMIDE AND ALBUTEROL SULFATE 2.5; .5 MG/3ML; MG/3ML
3 SOLUTION RESPIRATORY (INHALATION)
Status: DISCONTINUED | OUTPATIENT
Start: 2023-10-10 | End: 2023-10-10 | Stop reason: HOSPADM

## 2023-10-10 RX ORDER — ONDANSETRON 2 MG/ML
4 INJECTION INTRAMUSCULAR; INTRAVENOUS
Status: DISCONTINUED | OUTPATIENT
Start: 2023-10-10 | End: 2023-10-10 | Stop reason: HOSPADM

## 2023-10-10 RX ORDER — SODIUM CHLORIDE, SODIUM LACTATE, POTASSIUM CHLORIDE, CALCIUM CHLORIDE 600; 310; 30; 20 MG/100ML; MG/100ML; MG/100ML; MG/100ML
INJECTION, SOLUTION INTRAVENOUS CONTINUOUS
Status: ACTIVE | OUTPATIENT
Start: 2023-10-10 | End: 2023-10-10

## 2023-10-10 RX ORDER — HYDRALAZINE HYDROCHLORIDE 20 MG/ML
5 INJECTION INTRAMUSCULAR; INTRAVENOUS
Status: DISCONTINUED | OUTPATIENT
Start: 2023-10-10 | End: 2023-10-10 | Stop reason: HOSPADM

## 2023-10-10 RX ORDER — LABETALOL HYDROCHLORIDE 5 MG/ML
5 INJECTION, SOLUTION INTRAVENOUS
Status: DISCONTINUED | OUTPATIENT
Start: 2023-10-10 | End: 2023-10-10 | Stop reason: HOSPADM

## 2023-10-10 RX ORDER — EPHEDRINE SULFATE 50 MG/ML
INJECTION, SOLUTION INTRAVENOUS PRN
Status: DISCONTINUED | OUTPATIENT
Start: 2023-10-10 | End: 2023-10-10 | Stop reason: SURG

## 2023-10-10 RX ORDER — ONDANSETRON 2 MG/ML
INJECTION INTRAMUSCULAR; INTRAVENOUS PRN
Status: DISCONTINUED | OUTPATIENT
Start: 2023-10-10 | End: 2023-10-10 | Stop reason: SURG

## 2023-10-10 RX ORDER — OXYCODONE HCL 5 MG/5 ML
10 SOLUTION, ORAL ORAL
Status: DISCONTINUED | OUTPATIENT
Start: 2023-10-10 | End: 2023-10-10 | Stop reason: HOSPADM

## 2023-10-10 RX ORDER — METOPROLOL TARTRATE 1 MG/ML
1 INJECTION, SOLUTION INTRAVENOUS
Status: DISCONTINUED | OUTPATIENT
Start: 2023-10-10 | End: 2023-10-10 | Stop reason: HOSPADM

## 2023-10-10 RX ADMIN — DEXAMETHASONE SODIUM PHOSPHATE 8 MG: 4 INJECTION INTRA-ARTICULAR; INTRALESIONAL; INTRAMUSCULAR; INTRAVENOUS; SOFT TISSUE at 16:25

## 2023-10-10 RX ADMIN — PROPOFOL 50 MG: 10 INJECTION, EMULSION INTRAVENOUS at 16:25

## 2023-10-10 RX ADMIN — SODIUM CHLORIDE, POTASSIUM CHLORIDE, SODIUM LACTATE AND CALCIUM CHLORIDE: 600; 310; 30; 20 INJECTION, SOLUTION INTRAVENOUS at 16:04

## 2023-10-10 RX ADMIN — SODIUM CHLORIDE, POTASSIUM CHLORIDE, SODIUM LACTATE AND CALCIUM CHLORIDE: 600; 310; 30; 20 INJECTION, SOLUTION INTRAVENOUS at 16:37

## 2023-10-10 RX ADMIN — EPHEDRINE SULFATE 20 MG: 50 INJECTION INTRAVENOUS at 17:03

## 2023-10-10 RX ADMIN — ONDANSETRON 4 MG: 2 INJECTION INTRAMUSCULAR; INTRAVENOUS at 16:41

## 2023-10-10 RX ADMIN — PROPOFOL 50 MG: 10 INJECTION, EMULSION INTRAVENOUS at 16:46

## 2023-10-10 RX ADMIN — SUGAMMADEX 200 MG: 100 INJECTION, SOLUTION INTRAVENOUS at 17:33

## 2023-10-10 RX ADMIN — PROPOFOL 50 MG: 10 INJECTION, EMULSION INTRAVENOUS at 17:19

## 2023-10-10 RX ADMIN — FENTANYL CITRATE 100 MCG: 50 INJECTION, SOLUTION INTRAMUSCULAR; INTRAVENOUS at 16:11

## 2023-10-10 RX ADMIN — EPHEDRINE SULFATE 25 MG: 50 INJECTION INTRAVENOUS at 16:28

## 2023-10-10 RX ADMIN — PROPOFOL 150 MG: 10 INJECTION, EMULSION INTRAVENOUS at 16:15

## 2023-10-10 RX ADMIN — ROCURONIUM BROMIDE 50 MG: 10 INJECTION, SOLUTION INTRAVENOUS at 16:15

## 2023-10-10 RX ADMIN — LIDOCAINE HYDROCHLORIDE 100 MG: 20 INJECTION, SOLUTION EPIDURAL; INFILTRATION; INTRACAUDAL at 16:15

## 2023-10-10 ASSESSMENT — ENCOUNTER SYMPTOMS
NEUROLOGICAL NEGATIVE: 1
SHORTNESS OF BREATH: 1
EYES NEGATIVE: 1
COUGH: 1
PSYCHIATRIC NEGATIVE: 1
CARDIOVASCULAR NEGATIVE: 1
CONSTITUTIONAL NEGATIVE: 1
GASTROINTESTINAL NEGATIVE: 1
MUSCULOSKELETAL NEGATIVE: 1

## 2023-10-10 ASSESSMENT — FIBROSIS 4 INDEX: FIB4 SCORE: 1.53

## 2023-10-10 ASSESSMENT — PAIN SCALES - GENERAL: PAIN_LEVEL: 1

## 2023-10-10 NOTE — CONSULTS
"Pulmonary Consultation    Date of consult: 10/10/2023    Referring Physician  Dr. Michael    Reason for Consultation  RLL mass    History of Presenting Illness  82 y.o. female who presented 10/10/2023 with hx of active smoking who has RLL mass that is PET avid   PET scan done 9/28/2023  \"FINDINGS:     VISUALIZED BRAIN: Normal metabolic activity.     HEAD AND NECK: Normal physiologic uptake.     CHEST: Spiculated right lower lobe mass measures 3.5 x 2.6 cm on today's study. It has an SUV max of 15.92.     Hypermetabolic metastatic right hilar node with an SUV max of 7.9. No other FDG avid nodes in the chest.     Emphysema. Atherosclerosis and coronary calcifications.     ABDOMEN AND PELVIS: There is normal, uniform metabolic activity in the liver, spleen, adrenal glands, kidneys.     There is no hypermetabolic mesenteric, retroperitoneal, iliac, or inguinal lymphadenopathy.     Nonspecific physiologic activity in the colon and intestine is noted.     Benign right hepatic cyst, which does not require imaging follow-up. Colonic diverticulosis.     VISUALIZED MUSCULOSKELETAL SYSTEM: No hypermetabolic osseous lesions. Right hip arthroplasty.     IMPRESSION:     1.  Hypermetabolic right lower lobe mass, consistent with malignancy.  2.  A single hypermetabolic right hilar geovanny metastasis.  3.  No other FDG avid lymphadenopathy or metastatic disease.\"  Code Status  No Order    Review of Systems  Review of Systems   Constitutional: Negative.    HENT: Negative.     Eyes: Negative.    Respiratory:  Positive for cough and shortness of breath.    Cardiovascular: Negative.    Gastrointestinal: Negative.    Genitourinary: Negative.    Musculoskeletal: Negative.    Skin: Negative.    Neurological: Negative.    Endo/Heme/Allergies: Negative.    Psychiatric/Behavioral: Negative.         Past Medical History   has a past medical history of Anesthesia, Arthritis, Back pain, Basal cell adenocarcinoma, Chickenpox, Cough, Emphysema of " lung (HCC), Hearing difficulty, Heart valve disease (Leakage in mitral), Hypertension, Hypothyroidism, Immunization declined (09/01/2023), Osteoporosis, Painful joint, Right lower lobe lung mass (08/31/2023), Shortness of breath, Sputum production, Squamous cell skin cancer, Tobacco use (02/28/2020), and Wears glasses.    She has no past medical history of Acute nasopharyngitis, Anginal syndrome (HCC), Arrhythmia, Asthma, Blood clotting disorder (HCC), Bowel habit changes, Bronchitis, Carcinoma in situ of respiratory system, Cataract, Congestive heart failure (HCC), Continuous ambulatory peritoneal dialysis status (HCC), Coughing blood, Dental disorder, Diabetes (HCC), Dialysis patient (HCC), Glaucoma, Gynecological disorder, Heart burn, Heart murmur, Hemorrhagic disorder (HCC), Hepatitis A, Hepatitis B, Hepatitis C, Hiatus hernia syndrome, High cholesterol, Indigestion, Jaundice, Myocardial infarct (HCC), Pacemaker, Pneumonia, Pregnant, Psychiatric problem, Renal disorder, Rheumatic fever, Seizure (HCC), Sleep apnea, Snoring, Stroke (HCC), Tuberculosis, Urinary bladder disorder, or Urinary incontinence.    Surgical History   has a past surgical history that includes hip replacement, total (Right, 2017); rotator cuff repair (Bilateral, 1990); cataract extraction with iol (Bilateral); tonsillectomy; and abdominal hysterectomy total.    Family History  family history includes Heart Disease in her mother and sister; Hypertension in her sister; Stroke in her maternal aunt.    Social History   reports that she has been smoking cigarettes. She started smoking about 60 years ago. She has a 30.0 pack-year smoking history. She has never used smokeless tobacco. She reports current alcohol use of about 8.4 oz of alcohol per week. She reports that she does not use drugs.    Medications  Home Medications       Reviewed by Naya Thomas R.N. (Registered Nurse) on 10/10/23 at 1411  Med List Status: Complete     Medication  Last Dose Status   amoxicillin (AMOXIL) 500 MG Cap spring 2023 Active   Calcium Carb-Cholecalciferol (CALCIUM 500+D3 PO) 9/26/2023 Active   Cholecalciferol (VITAMIN D3) 125 MCG (5000 UT) Cap 9/26/2023 Active   Cyanocobalamin (VITAMIN B 12 PO) 9/26/2023 Active   denosumab (PROLIA) 60 MG/ML Solution Prefilled Syringe injection sept 2023 Active   fluticasone (FLONASE) 50 MCG/ACT nasal spray  Active   ibuprofen (MOTRIN) 200 MG Tab 10/3/2023 Active   levothyroxine (SYNTHROID) 100 MCG Tab 10/9/2023 Active   Multiple Vitamins-Minerals (EYE VITAMINS & MINERALS PO) 9/26/2023 Active   NON SPECIFIED 9/26/2023 Active   valACYclovir (VALTREX) 500 MG Tab  Active   zinc sulfate (ZINCATE) 220 (50 Zn) MG Cap 9/26/2023 Active                  Current Facility-Administered Medications   Medication Dose Route Frequency Provider Last Rate Last Admin    lactated ringers infusion   Intravenous Continuous Sarah Sanders M.D.           Allergies  Allergies   Allergen Reactions    Other Environmental      Seasonal allergies       Vital Signs last 24 hours  Temp:  [36.5 °C (97.7 °F)] 36.5 °C (97.7 °F)  Pulse:  [65] 65  Resp:  [18] 18  BP: (160)/(77) 160/77  SpO2:  [89 %] 89 %    Physical Exam  Physical Exam  HENT:      Head: Normocephalic.      Mouth/Throat:      Mouth: Mucous membranes are moist.   Eyes:      Extraocular Movements: Extraocular movements intact.      Pupils: Pupils are equal, round, and reactive to light.   Cardiovascular:      Rate and Rhythm: Normal rate.   Pulmonary:      Effort: Pulmonary effort is normal.      Breath sounds: Normal breath sounds.   Musculoskeletal:      Cervical back: Normal range of motion.   Skin:     General: Skin is warm and dry.   Neurological:      General: No focal deficit present.      Mental Status: She is alert and oriented to person, place, and time.   Psychiatric:         Mood and Affect: Mood normal.         Behavior: Behavior normal.         Thought Content: Thought content normal.          Judgment: Judgment normal.            Assessment/Plan  #RLL mass  Active tobacco use  Suspicious for malignancy  Reviewed risks and benefits of bronchoscopy with biopsy and lavage  Pt has agreed to proceed

## 2023-10-10 NOTE — ANESTHESIA PROCEDURE NOTES
Airway    Date/Time: 10/10/2023 4:17 PM    Performed by: Darryl Riggins M.D.  Authorized by: Darryl Riggins M.D.    Location:  OR  Urgency:  Elective  Difficult Airway: No    Indications for Airway Management:  Anesthesia      Spontaneous Ventilation: absent    Sedation Level:  Deep  Preoxygenated: Yes    Patient Position:  Sniffing  Mask Difficulty Assessment:  1 - vent by mask  Final Airway Type:  Endotracheal airway  Final Endotracheal Airway:  ETT  Cuffed: Yes    Technique Used for Successful ETT Placement:  Direct laryngoscopy    Insertion Site:  Oral  Blade Type:  Chacko  Laryngoscope Blade/Videolaryngoscope Blade Size:  2  ETT Size (mm):  8.0  Measured from:  Teeth  ETT to Teeth (cm):  21  Placement Verified by: auscultation and capnometry    Cormack-Lehane Classification:  Grade I - full view of glottis  Number of Attempts at Approach:  1

## 2023-10-10 NOTE — ANESTHESIA PREPROCEDURE EVALUATION
Case: [id number] Date/Time: 10/10/23 1515    Procedures:       FIBER OPTIC BRONCHOSCOPY WITH POSSIBLE WASH, BRUSH, BRONCHOALVEOLAR LAVAGE, BIOPSY, FINE NEEDLE ASPIRATION & NAVIGATION, ROBOTICS      ENDOBRONCHIAL ULTRASOUND (EBUS)    Pre-op diagnosis: R LOWER LOBE LUNG MASS, TOBACCO USE    Location:  PROCEDURE ROOM / SURGERY [facility]    Surgeons: Sarah Sanders M.D.          Relevant Problems   ENDO   (positive) Hypothyroidism       Physical Exam    Airway   Mallampati: II  TM distance: >3 FB  Neck ROM: full       Cardiovascular - normal exam  Rhythm: regular  Rate: normal  (-) murmur     Dental - normal exam  (+) implants           Pulmonary - normal exam  Breath sounds clear to auscultation  (+) decreased breath sounds     Abdominal    Neurological - normal exam                 Anesthesia Plan    ASA 3   ASA physical status 3 criteria: COPD - poorly controlled    Plan - general       Airway plan will be ETT          Induction: intravenous      Pertinent diagnostic labs and testing reviewed    Informed Consent:    Anesthetic plan and risks discussed with patient.    Use of blood products discussed with: patient whom consented to blood products.

## 2023-10-10 NOTE — PROGRESS NOTES
Patient was presented for a telehealth consultation via secure and encrypted videoconferencing technology.

## 2023-10-10 NOTE — OR NURSING
Procedure, patient allergies, and NPO status verified. Home med rec completed and belongings secured. Patient verbalizes understanding of pain scale, expected course of stay, and plan of care. Surgical site verified with pt, IV access established.     Dr. Riggins notified and aware of pt BP and O2 saturations.

## 2023-10-11 LAB
GRAM STN SPEC: NORMAL
SIGNIFICANT IND 70042: NORMAL
SITE SITE: NORMAL
SOURCE SOURCE: NORMAL

## 2023-10-11 NOTE — FLOWSHEET NOTE
10/10/23 1606   Bronchoscopy Procedure   Bronchoscopy Procedure Yes   Order placed in Epic? Yes   Start Time 1606   End Time 1735   Performing Physician Dr. Anderson   Procedure Monitoring Tech Time Bronchoscopy (60 Min)

## 2023-10-11 NOTE — DISCHARGE INSTRUCTIONS
Bronchoscopy Discharge Instructions  Home Care Instructions    ACTIVITY: Rest and take it easy for the first 24 hours.  A responsible adult is recommended to remain with you during that time.  It is normal to feel sleepy.  We encourage you to not do anything that requires balance, judgment or coordination.    The medicine you had during the bronchoscopy will make you sleepy.    FOR 24 HOURS DO NOT:  Drive, operate machinery or run household appliances.  Drink beer or alcoholic beverages.  Make important decisions or sign legal documents.  Engage in activity that requires sharp judgment and reflexes for 24 hours  SPECIAL INSTRUCTIONS:     -Call you doctor and go to the ER if you are coughing up more than 2 tablespoons of bright red blood.   -Call your doctor and go to the ER if you experience acute onset of shortness of breath and/or increased chest pain.   -Call your doctor and go to the ER if you develop a fever greater than 101.5 degrees Fahrenheit.    Bronchoscopy is a procedure to look inside your windpipe and bronchial tubes.  An anesthetic solution is sprayed in your throat to make it numb.    You may experience a mild sore throat, hoarseness, fever up to 101?F, and /or coughing up small amounts of blood immediately following your bronchoscopy, especially if a biopsy was performed.  The discomfort should subside in 24-48 hours.    Do not smoke for 6-8 hours after the procedure to decrease your risk of coughing and /or bleeding.    Do not drink fluids or eat until your gag reflex returns, for two hours after the bronchoscopy.  Otherwise you will not feel the food or fluid in your throat, and it may go down your windpipe and cause you to choke.    Take ice chips or slowly sip cool fluids to make sure your gag reflex has returned.  Avoid hot fluids from the microwave for several hours.    After 2 hours or when you get home you may take throat lozenges or gargle with salt water if your throat is sore.  Drink  liquids to help dryness in your mouth and throat.    Resume your normal activities the following day.    MEDICATIONS: Resume taking daily medication as directed by your doctor.     A follow-up appointment should be arranged with your doctor in 1 week to get the results of the bronchoscopy and any tests done during the procedure; call to schedule.     You should CALL YOUR PHYSICIAN if you develop:  You are wheezing  You develop any unusual signs or symptoms or have any questions                You should call 911 if you develop problems with breathing or chest pain.    If you are unable to contact your doctor or surgical center, you should go to the nearest emergency room or urgent care center.  Physician's telephone #: [phone]      If any questions arise, call your doctor.  If your doctor is not available, please feel free to call the Surgical Center at [phone].  The Center is open Monday through Friday from 7AM to 7PM.      You may receive a survey in the mail within the next two weeks and we ask that you take a few moments to complete the survey and return it to us.  Our goal is to provide you with very good care and we value your comments.

## 2023-10-11 NOTE — OR NURSING
1740: To PACU from procedure room via gurney, sleeping, respirations spontaneous and non-labored  1745: Rouses spontaneously, denies pain, nausea and dyspnea. HOB elevated. DB+C. O2 weaning commenced  1755: O2 d/juanita, pt has IS from pre-op and uses at this time.  1810: CXR completed   1825: awaiting CXR results  1834: Meets criteria to transfer to Stage 2

## 2023-10-11 NOTE — PROCEDURES
Fiberoptic Bronchoscopy/Endotracheal Ultrasound(EBUS)/Navigational bronchoscopy     Date/Time of Procedure:10/10/2023     Indication(s): RLL mass    Consent: Informed, written consent was obtained prior to the procedure; risks, benefits, & alternatives were discussed.    Universal Protocol/Time Out: A Time Out with checklist was performed prior to the procedure to ensure correct identification of the patient and procedure.    Pre-Procedure Diagnosis: r/o Ca    Allergies:  Allergies   Allergen Reactions    Other Environmental      Seasonal allergies        Sedation/Analgesia/Anesthesia: Sedation as per anesthesia.    Additional Modalities:    [x] Fluoroscopy  [_] Radial Ultrasound  [_] Linear Endobronchial Ultrasound  [_] Rapid On-Site Evaluation    Route of Entry:  [_] Nasal [_] Oral [X] ET tube [_] Trach [_] LMA      Findings: After the patient is adequately anesthetized (see procedure for anesthesia), the flexible bronchoscope was passed through the endotracheal tube visualizing the distal trachea:  Trachea: normal appearing  Tracey: sharp  Right lung: RUL, RML, all level one subsegments visualized. Only when the robot was used that the endobronchial lesion was seen in the superior segments  Left lung: JOHN, Lingula and LLL , All level one subsegments visualized. No endobronchial     ENDOBRONCHIAL US    Using the endobronchial ultrasound, a systematic survey of the accessible mediastinal and hilar lymph nodes was performed, no notable for lymphadenopathy at station 7, 4L and 4R.     Robotic Navigational Bronchoscopy    Robotic navigation bronchoscopy was performed with Ion platform.  Partial registration was used.    Ion robotic catheter was used to engage the sup segment of right lower lung.  Target lesion is about 2 cm in diameter.   Under navigational guidance the ion robotic catheter was advanced to 1 mm away from the planned target.   Another path was to the rll area on PET scan but that lead to the exit of  the sup segment and bx from the area showed inflammatory cells    Radial EBUS was performed to confirm that the location of the nodule is concentric      Transbronchial needle aspiration was performed with 21 gauge needle through the extended working channel catheter.  Total 4 samples were collected on path 1 which was .  Samples sent for pathology.    Transbronchial biopsy was performed with  forceps through the extended working channel catheter.  Total 14 samples were collected.  Samples sent for pathology      Bronchial alveolar lavage was performed the extended working channel catheter.  Instilled 20 cc of NS, suction returned with 8 cc of NS.  Samples sent for pathology.    ALVA findings: +atypical cells       Specimens: _  [x] Bronchoalveolar Lavage (BAL):    [x] Transbronchial Needle Aspiration (TBNA):   [x] Endobronchial Biopsy (Endo):         Impression and plan  RLL mass and hilar node on PET scan  On robotic endobronchial evaluation sup segment had an endobronchial lesion  So the RLL mass may be post obstructive  Reviewed with daughter Trudy  CXR no pneumothorax

## 2023-10-11 NOTE — OR NURSING
1834: Patient arrived to phase 2. Patient changed out of surgical gown into clothes. Patient is A&Ox4. Patient reports no pain and no nausea. Vital signs taken and patient assessed. Asked the patient if they had to use the bathroom.    1907: IV removed and discharge instructions read. All questions answered.     1915: Discharged to care of responsible adult via wheel chair by this RN.

## 2023-10-11 NOTE — ANESTHESIA TIME REPORT
Anesthesia Start and Stop Event Times     Date Time Event    10/10/2023 1606 Anesthesia Start     1607 Ready for Procedure     1743 Anesthesia Stop        Responsible Staff  10/10/23    Name Role Begin End    Darryl Riggins M.D. Anesth 1606 1743        Overtime Reason:  no overtime (within assigned shift)    Comments:

## 2023-10-11 NOTE — ANESTHESIA POSTPROCEDURE EVALUATION
Patient: Ruby Buckner    Procedure Summary     Date: 10/10/23 Room / Location:  PROCEDURE ROOM / SURGERY [facility]    Anesthesia Start: 1606 Anesthesia Stop: 1743    Procedures:       FIBER OPTIC BRONCHOSCOPY WITH WASH, BRUSH, BRONCHOALVEOLAR LAVAGE, BIOPSY, FINE NEEDLE ASPIRATION & NAVIGATION, ROBOTICS      ENDOBRONCHIAL ULTRASOUND (EBUS) Diagnosis:       Lung mass      (Lung mass [433482])    Surgeons: Sarah Sanders M.D. Responsible Provider: Darryl Riggins M.D.    Anesthesia Type: general ASA Status: 3          Final Anesthesia Type: general  Last vitals  BP   Blood Pressure : (!) 160/77    Temp   36.5 °C (97.7 °F)    Pulse   65   Resp   18    SpO2   89 %      Anesthesia Post Evaluation    Patient location during evaluation: PACU  Patient participation: complete - patient participated  Level of consciousness: awake and alert  Pain score: 1    Airway patency: patent  Anesthetic complications: no  Cardiovascular status: hemodynamically stable  Respiratory status: acceptable  Hydration status: euvolemic    PONV: none          There were no known notable events for this encounter.     Nurse Pain Score: 0 (NPRS)

## 2023-10-12 LAB
BACTERIA SPEC RESP CULT: NORMAL
GRAM STN SPEC: NORMAL
SIGNIFICANT IND 70042: NORMAL
SITE SITE: NORMAL
SOURCE SOURCE: NORMAL

## 2023-10-13 ENCOUNTER — TELEPHONE (OUTPATIENT)
Dept: SLEEP MEDICINE | Facility: MEDICAL CENTER | Age: 83
End: 2023-10-13
Payer: MEDICARE

## 2023-10-13 DIAGNOSIS — C80.1 ADENOCARCINOMA (HCC): ICD-10-CM

## 2023-10-14 ENCOUNTER — PATIENT MESSAGE (OUTPATIENT)
Dept: SLEEP MEDICINE | Facility: MEDICAL CENTER | Age: 83
End: 2023-10-14
Payer: MEDICARE

## 2023-10-14 NOTE — TELEPHONE ENCOUNTER
Telephone call    Called Trudy patient's daughter to review result of biopsy  Trudy will talk to her mum  Biopsy shows lung cancer - nonsmall cell lung cancer (poorly differentiated adenocarcinoma)    Main concern is surgery is extensive based on the endobronchial (intrabronchial) lesion and do think she may not be a surgical candidate and chemotherapy and radiation may be a better first step but to discuss with oncology.    Placed referral to Dr. Lovelace per patient's request

## 2023-10-17 RX ORDER — ALBUTEROL SULFATE 90 UG/1
2 AEROSOL, METERED RESPIRATORY (INHALATION) EVERY 6 HOURS PRN
Qty: 8.5 G | Refills: 1 | Status: SHIPPED | OUTPATIENT
Start: 2023-10-17

## 2023-10-17 RX ORDER — BENZONATATE 100 MG/1
100 CAPSULE ORAL 3 TIMES DAILY PRN
Qty: 60 CAPSULE | Refills: 0 | Status: SHIPPED | OUTPATIENT
Start: 2023-10-17

## 2023-10-26 ENCOUNTER — OFFICE VISIT (OUTPATIENT)
Dept: INTERNAL MEDICINE | Facility: OTHER | Age: 83
End: 2023-10-26
Payer: MEDICARE

## 2023-10-26 VITALS
SYSTOLIC BLOOD PRESSURE: 143 MMHG | BODY MASS INDEX: 17.64 KG/M2 | WEIGHT: 93.4 LBS | OXYGEN SATURATION: 96 % | HEIGHT: 61 IN | DIASTOLIC BLOOD PRESSURE: 86 MMHG | HEART RATE: 59 BPM | TEMPERATURE: 97.7 F

## 2023-10-26 DIAGNOSIS — R03.0 ELEVATED BLOOD PRESSURE READING: ICD-10-CM

## 2023-10-26 DIAGNOSIS — C34.91 NON-SMALL CELL CANCER OF RIGHT LUNG (HCC): ICD-10-CM

## 2023-10-26 DIAGNOSIS — S50.312D ABRASION OF LEFT ELBOW, SUBSEQUENT ENCOUNTER: ICD-10-CM

## 2023-10-26 DIAGNOSIS — S09.90XD TRAUMATIC INJURY OF HEAD, SUBSEQUENT ENCOUNTER: ICD-10-CM

## 2023-10-26 PROCEDURE — 99214 OFFICE O/P EST MOD 30 MIN: CPT | Performed by: INTERNAL MEDICINE

## 2023-10-26 PROCEDURE — 3079F DIAST BP 80-89 MM HG: CPT | Performed by: INTERNAL MEDICINE

## 2023-10-26 PROCEDURE — 3077F SYST BP >= 140 MM HG: CPT | Performed by: INTERNAL MEDICINE

## 2023-10-26 ASSESSMENT — FIBROSIS 4 INDEX: FIB4 SCORE: 1.53

## 2023-10-26 NOTE — PROGRESS NOTES
10/26/2023  Chief Complaint   Patient presents with    Follow-Up     Staple removal,   L Elbow injury       HISTORY OF PRESENT ILLNESS: Patient is a 82 y.o. female established patient who presents today for the following.  Patient reports a fall 10/16. She was getting out of chair outside and her shoe slipped out from under her. She fell to her back and hit her head and left elbow. Patient was seen at [facility] ER, received a staple to left posterior head. She has quarter-size abrasion of the left elbow that is healing well. Patient denies any dizziness, light-headedness, changes in vision or headache. She is here for staple removal.    Patient recently diagnosed NSC lung cancer, has seen Pulm. PET scan completed with solitary right lung nodules and right hilar adenopathy. Per notes, due to the location of cancer, it may not be amenable for surgery. She was referred to Heme/Onco for further evaluation. Patient reports she has MRI brain schedule in the next month. She continues to have a cough, denies any hemoptysis or SHOB.      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     Osteoporosis     Painful joint     Right lower lobe lung mass 08/31/2023    Superior segment. >3 cm. Spiculated.     Shortness of breath     Sputum production     Squamous cell skin cancer     Tobacco use 02/28/2020    Wears glasses        Patient Active Problem List    Diagnosis Date Noted    Non-small cell cancer of right lung (HCC) 10/26/2023    Immunization declined  09/01/2023    Right lower lobe lung mass 08/31/2023    Alcohol use 08/01/2022    Macrocytosis 07/02/2021    Leukocytosis 07/02/2021    Advance directive discussed with patient 02/09/2021    Age-related osteoporosis without current pathological fracture 12/22/2020    Osteoporosis 02/28/2020    Vitamin D deficiency, unspecified 02/28/2020    Tobacco use 02/28/2020    Osteoarthritis, generalized 02/28/2020    Neuropathy 02/28/2020    Hypothyroidism 02/28/2020       Allergies:Other environmental    Current Outpatient Medications   Medication Sig Dispense Refill    benzonatate (TESSALON) 100 MG Cap Take 1 Capsule by mouth 3 times a day as needed for Cough. 60 Capsule 0    albuterol 108 (90 Base) MCG/ACT Aero Soln inhalation aerosol Inhale 2 Puffs every 6 hours as needed for Shortness of Breath. 8.5 g 1    fluticasone (FLONASE) 50 MCG/ACT nasal spray SPRAY 1 SPRAY INTO EACH NOSTRIL EVERY DAY 48 mL 1    NON SPECIFIED every day. Bone up, OTC supplement      Multiple Vitamins-Minerals (EYE VITAMINS & MINERALS PO) Take  by mouth every day.      Cholecalciferol (VITAMIN D3) 125 MCG (5000 UT) Cap Take 1 Capsule by mouth every day.      Calcium Carb-Cholecalciferol (CALCIUM 500+D3 PO) Take  by mouth every day.      amoxicillin (AMOXIL) 500 MG Cap Take 500 mg by mouth. Prior to dental work      levothyroxine (SYNTHROID) 100 MCG Tab Take 1 Tablet by mouth every morning on an empty stomach. 90 Tablet 1    Cyanocobalamin (VITAMIN B 12 PO) Take 500 mg by mouth every day.      valACYclovir (VALTREX) 500 MG Tab Take 1 Tablet by mouth 2 times a day as needed (severe cold sore). 30 Tablet 0    ibuprofen (MOTRIN) 200 MG Tab Take 200 mg by mouth every 6 hours as needed.      denosumab (PROLIA) 60 MG/ML Solution Prefilled Syringe injection PROLIA 60 MG/ML SOSY 1 mL 2    zinc sulfate (ZINCATE) 220 (50 Zn) MG Cap Take 220 mg by mouth every day.       No current facility-administered medications for this visit.       Social History     Tobacco  "Use    Smoking status: Every Day     Current packs/day: 0.50     Average packs/day: 0.5 packs/day for 60.1 years (30.1 ttl pk-yrs)     Types: Cigarettes     Start date: 9/6/1963    Smokeless tobacco: Never    Tobacco comments:     9/6/2023 pt reports currently smoking 1/4ppd   Vaping Use    Vaping Use: Never used   Substance Use Topics    Alcohol use: Yes     Alcohol/week: 8.4 oz     Types: 14 Glasses of wine per week     Comment: 2 glasses of wine per day    Drug use: No       Family History   Problem Relation Age of Onset    Hypertension Sister     Heart Disease Sister     Stroke Maternal Aunt     Heart Disease Mother          Review of Systems:   All systems reviewed and are negative except as mention in HPI    Exam:  BP (!) 143/86 (BP Location: Right arm, Patient Position: Sitting, BP Cuff Size: Small adult)   Pulse (!) 59   Temp 36.5 °C (97.7 °F) (Temporal)   Ht 1.545 m (5' 0.83\")   Wt 42.4 kg (93 lb 6.4 oz)   SpO2 96%  Body mass index is 17.75 kg/m².  Constitutional:  NAD, well appearing.  HEENT:   Normcephalic, single staple to the left posterior head, dry scab. No hematoma noted. Pupils equal, EOMI  Cardiovascular: RRR.    Lungs:   CTAB, no wheezes, rales or rhonchi, no respiratory distress.  Extremities:  Quarter-size superficial abrasion to left elbow- healing well, no evidence of infection.  Skin:  Warm and dry.    Neurologic: Alert & oriented x 3. No focal deficits noted.  Psychiatric:  Affect normal, mood normal, judgment normal.    Assessment/Plan:   Traumatic injury of head, subsequent encounter  Abrasion of left elbow, subsequent encounter  Had fall 10/16 seen at [facility]. No symptoms of concussion  Laceration has healed well, single staple removed today.    Non-small cell cancer of right lung (HCC)  Followed by Pulm and Heme/Onc  Recent PET with solitary right lung nodule and right hilar lymph node.    Elevated blood pressure  Likely white coat hypertension  Home blood pressure readings 126/68 this " morning.  Continue to monitor and bring log to next appt.     All imaging results and lab results and consult notes are reviewed at this visit.  Followup: 11/6

## 2023-11-02 ENCOUNTER — PATIENT OUTREACH (OUTPATIENT)
Dept: ONCOLOGY | Facility: MEDICAL CENTER | Age: 83
End: 2023-11-02
Payer: MEDICARE

## 2023-11-02 NOTE — PROGRESS NOTES
"Call placed to patient to verify she got connected to [facility].  Pt reports already saw Dr Lovelace and has an appointment a week from tomorrow to see \"Nadia\".  She reported believes last name is Armando.  Pt will be seeing Akira Roberts, radiation oncology.  Pt is being seen outside of [facility] for [facility].  [facility]    "
KATERYNA Conklin PA

## 2023-11-07 ENCOUNTER — TELEPHONE (OUTPATIENT)
Dept: INTERNAL MEDICINE | Facility: OTHER | Age: 83
End: 2023-11-07

## 2023-11-07 ENCOUNTER — OFFICE VISIT (OUTPATIENT)
Dept: INTERNAL MEDICINE | Facility: OTHER | Age: 83
End: 2023-11-07
Payer: MEDICARE

## 2023-11-07 VITALS
HEIGHT: 61 IN | WEIGHT: 97.8 LBS | DIASTOLIC BLOOD PRESSURE: 70 MMHG | BODY MASS INDEX: 18.46 KG/M2 | TEMPERATURE: 97.8 F | SYSTOLIC BLOOD PRESSURE: 121 MMHG | HEART RATE: 68 BPM | OXYGEN SATURATION: 92 %

## 2023-11-07 DIAGNOSIS — C34.31 MALIGNANT NEOPLASM OF LOWER LOBE OF RIGHT LUNG (HCC): ICD-10-CM

## 2023-11-07 DIAGNOSIS — M20.41 HAMMER TOES OF BOTH FEET: ICD-10-CM

## 2023-11-07 DIAGNOSIS — R26.9 GAIT DISTURBANCE: ICD-10-CM

## 2023-11-07 DIAGNOSIS — M20.42 HAMMER TOES OF BOTH FEET: ICD-10-CM

## 2023-11-07 PROCEDURE — 3078F DIAST BP <80 MM HG: CPT | Performed by: STUDENT IN AN ORGANIZED HEALTH CARE EDUCATION/TRAINING PROGRAM

## 2023-11-07 PROCEDURE — 99214 OFFICE O/P EST MOD 30 MIN: CPT | Mod: GC | Performed by: STUDENT IN AN ORGANIZED HEALTH CARE EDUCATION/TRAINING PROGRAM

## 2023-11-07 PROCEDURE — 3074F SYST BP LT 130 MM HG: CPT | Performed by: STUDENT IN AN ORGANIZED HEALTH CARE EDUCATION/TRAINING PROGRAM

## 2023-11-07 ASSESSMENT — ENCOUNTER SYMPTOMS
VOMITING: 0
FEVER: 0
NAUSEA: 0
DOUBLE VISION: 0
HEARTBURN: 0
PALPITATIONS: 0
BLURRED VISION: 0
CHILLS: 0

## 2023-11-07 ASSESSMENT — FIBROSIS 4 INDEX: FIB4 SCORE: 1.55

## 2023-11-07 NOTE — PROGRESS NOTES
Chief Complaint   Patient presents with    Follow-Up     Patient here for DMV placards forms       HISTORY OF PRESENT ILLNESS: Patient is a 83 y.o. female established patient who presents today for the following.      1. Hammer toes of both feet  2. Gait disturbance  Patient presenting today with testing consideration for DMV placard in the setting of gait disturbance.  Gait disturbance per patient in part due to hammertoes of both feet.  She has dealt with this for several decades (patient had reported correction with orthopedic surgery in the 70s, however unfortunately did have recurrence).  Patient also states her diffuse osteoarthritis has caused her pain as well and is contributing towards her gait disturbance.  She denies any significant falls, but does experience some mild disequilibrium.  He denies any blurry vision, double vision at this time.  She states unfortunately she has not been able to work with her  as frequently as she would like to given circumstances surrounding her ongoing work-up and management of adenocarcinoma of the lung.    3. Malignant neoplasm of lower lobe of right lung (HCC)  Patient with recent history of above, pathology most suspicious for adenocarcinoma.  Patient currently undergoing work-up with hematology/oncology, with plans for MRI later this month.  Patient denies hemoptysis, but does report some ongoing shortness of breath.      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      Osteoporosis     Painful joint     Right lower lobe lung mass 08/31/2023    Superior segment. >3 cm. Spiculated.     Shortness of breath     Sputum production     Squamous cell skin cancer     Tobacco use 02/28/2020    Wears glasses        Patient Active Problem List    Diagnosis Date Noted    Non-small cell cancer of right lung (HCC) 10/26/2023    Immunization declined 09/01/2023    Right lower lobe lung mass 08/31/2023    Alcohol use 08/01/2022    Macrocytosis 07/02/2021    Leukocytosis 07/02/2021    Advance directive discussed with patient 02/09/2021    Age-related osteoporosis without current pathological fracture 12/22/2020    Osteoporosis 02/28/2020    Vitamin D deficiency, unspecified 02/28/2020    Tobacco use 02/28/2020    Osteoarthritis, generalized 02/28/2020    Neuropathy 02/28/2020    Hypothyroidism 02/28/2020       Allergies:Other environmental    Current Outpatient Medications   Medication Sig Dispense Refill    benzonatate (TESSALON) 100 MG Cap Take 1 Capsule by mouth 3 times a day as needed for Cough. 60 Capsule 0    albuterol 108 (90 Base) MCG/ACT Aero Soln inhalation aerosol Inhale 2 Puffs every 6 hours as needed for Shortness of Breath. 8.5 g 1    fluticasone (FLONASE) 50 MCG/ACT nasal spray SPRAY 1 SPRAY INTO EACH NOSTRIL EVERY DAY 48 mL 1    NON SPECIFIED every day. Bone up, OTC supplement      Multiple Vitamins-Minerals (EYE VITAMINS & MINERALS PO) Take  by mouth every day.      Cholecalciferol (VITAMIN D3) 125 MCG (5000 UT) Cap Take 1 Capsule by mouth every day.      Calcium Carb-Cholecalciferol (CALCIUM 500+D3 PO) Take  by mouth every day.      amoxicillin (AMOXIL) 500 MG Cap Take 500 mg by mouth. Prior to dental work      levothyroxine (SYNTHROID) 100 MCG Tab Take 1 Tablet by mouth every morning on an empty stomach. 90 Tablet 1    Cyanocobalamin (VITAMIN B 12 PO) Take 500 mg by mouth every day.      valACYclovir (VALTREX) 500 MG Tab Take 1 Tablet by mouth 2 times a day as needed (severe cold  "sore). 30 Tablet 0    ibuprofen (MOTRIN) 200 MG Tab Take 200 mg by mouth every 6 hours as needed.      denosumab (PROLIA) 60 MG/ML Solution Prefilled Syringe injection PROLIA 60 MG/ML SOSY 1 mL 2    zinc sulfate (ZINCATE) 220 (50 Zn) MG Cap Take 220 mg by mouth every day.       No current facility-administered medications for this visit.       Social History     Tobacco Use    Smoking status: Every Day     Current packs/day: 0.50     Average packs/day: 0.5 packs/day for 60.2 years (30.1 ttl pk-yrs)     Types: Cigarettes     Start date: 9/6/1961    Smokeless tobacco: Never    Tobacco comments:     9/6/2023 pt reports currently smoking 1/4ppd   Vaping Use    Vaping Use: Never used   Substance Use Topics    Alcohol use: Yes     Alcohol/week: 8.4 oz     Types: 14 Glasses of wine per week     Comment: 2 glasses of wine per day    Drug use: No       Family History   Problem Relation Age of Onset    Hypertension Sister     Heart Disease Sister     Stroke Maternal Aunt     Heart Disease Mother          Review of Systems   Review of Systems   Constitutional:  Negative for chills and fever.   Eyes:  Negative for blurred vision and double vision.   Cardiovascular:  Negative for chest pain and palpitations.   Gastrointestinal:  Negative for heartburn, nausea and vomiting.   Genitourinary:  Negative for dysuria and urgency.       Exam:  /70 (BP Location: Right arm, Patient Position: Sitting, BP Cuff Size: Small adult)   Pulse 68   Temp 36.6 °C (97.8 °F) (Temporal)   Ht 1.537 m (5' 0.5\")   Wt 44.4 kg (97 lb 12.8 oz)   SpO2 92%  Body mass index is 18.79 kg/m².    Constitutional:  Not in acute distress, well appearing.  HEENT:   NC/AT  Cardiovascular: Regular rate and rhythm. No murmurs or gallops.      Lungs:   Clear to auscultation bilaterally. No wheezes or crackles. No respiratory distress.  Abdomen: Not distended, soft, not tender. No guarding or rigidity. No masses.  Extremities:  No cyanosis/clubbing/edema. No " obvious deformities.  Skin:  Warm and dry.  No visible rashes.  Neurologic: Alert & oriented x 3, CN II-XII grossly intact, strength and sensation grossly intact.  Mild gait disequilibrium appreciated on exam  Psychiatric:  Affect normal, mood normal, judgment normal.    Assessment/Plan:     1. Hammer toes of both feet  2. Gait disturbance  No plan for referral to orthopedics or podiatry for hammertoes (patient receptive clinic), will however refer to physical therapy for assistance overall gait disturbance.  - Referral to Physical Therapy  - Temporary DMV disability placard form completed    3. Malignant neoplasm of lower lobe of right lung (HCC)  Patient with history above, currently undergoing work-up and management with heme-onc  - Encouraged patient to continue following up with heme-onc as scheduled.      All imaging results and lab results and consult notes are reviewed at this visit.  Followup: Return in about 3 months (around 2/7/2024) for General f/u.    Please note that this dictation was created using voice recognition software. I have made every reasonable attempt to correct obvious errors, but I expect that there are errors of grammar and possibly content that I did not discover before finalizing the note.    Jonny Mondragon MD  PGY-3  Internal Medicine

## 2023-11-07 NOTE — PATIENT INSTRUCTIONS
Thank you for coming in today, please follow-up with me in 3 months  Please call our clinic if you have any future questions or concerns, or if you'd like to be seen sooner for any reason  Enjoy the holiday season!

## 2023-11-15 ENCOUNTER — HOSPITAL ENCOUNTER (OUTPATIENT)
Dept: RADIOLOGY | Facility: MEDICAL CENTER | Age: 83
End: 2023-11-15
Attending: INTERNAL MEDICINE
Payer: MEDICARE

## 2023-11-15 DIAGNOSIS — C34.31 SQUAMOUS CELL CARCINOMA OF BRONCHUS IN RIGHT LOWER LOBE (HCC): ICD-10-CM

## 2023-11-15 PROCEDURE — 70553 MRI BRAIN STEM W/O & W/DYE: CPT

## 2023-11-15 PROCEDURE — 700117 HCHG RX CONTRAST REV CODE 255: Performed by: INTERNAL MEDICINE

## 2023-11-15 PROCEDURE — A9579 GAD-BASE MR CONTRAST NOS,1ML: HCPCS | Performed by: INTERNAL MEDICINE

## 2023-11-15 RX ADMIN — GADOTERIDOL 9 ML: 279.3 INJECTION, SOLUTION INTRAVENOUS at 14:06

## 2023-11-16 ENCOUNTER — HOSPITAL ENCOUNTER (OUTPATIENT)
Facility: MEDICAL CENTER | Age: 83
End: 2023-11-16
Attending: INTERNAL MEDICINE
Payer: MEDICARE

## 2023-11-16 PROCEDURE — 80053 COMPREHEN METABOLIC PANEL: CPT

## 2023-11-17 LAB
ALBUMIN SERPL BCP-MCNC: 4.7 G/DL (ref 3.2–4.9)
ALBUMIN/GLOB SERPL: 2.5 G/DL
ALP SERPL-CCNC: 92 U/L (ref 30–99)
ALT SERPL-CCNC: 21 U/L (ref 2–50)
ANION GAP SERPL CALC-SCNC: 13 MMOL/L (ref 7–16)
AST SERPL-CCNC: 19 U/L (ref 12–45)
BILIRUB SERPL-MCNC: 0.4 MG/DL (ref 0.1–1.5)
BUN SERPL-MCNC: 14 MG/DL (ref 8–22)
CALCIUM ALBUM COR SERPL-MCNC: 9.1 MG/DL (ref 8.5–10.5)
CALCIUM SERPL-MCNC: 9.7 MG/DL (ref 8.5–10.5)
CHLORIDE SERPL-SCNC: 92 MMOL/L (ref 96–112)
CO2 SERPL-SCNC: 23 MMOL/L (ref 20–33)
CREAT SERPL-MCNC: 0.62 MG/DL (ref 0.5–1.4)
GFR SERPLBLD CREATININE-BSD FMLA CKD-EPI: 89 ML/MIN/1.73 M 2
GLOBULIN SER CALC-MCNC: 1.9 G/DL (ref 1.9–3.5)
GLUCOSE SERPL-MCNC: 89 MG/DL (ref 65–99)
POTASSIUM SERPL-SCNC: 4.4 MMOL/L (ref 3.6–5.5)
PROT SERPL-MCNC: 6.6 G/DL (ref 6–8.2)
SODIUM SERPL-SCNC: 128 MMOL/L (ref 135–145)

## 2023-11-29 ENCOUNTER — PATIENT MESSAGE (OUTPATIENT)
Dept: HEALTH INFORMATION MANAGEMENT | Facility: OTHER | Age: 83
End: 2023-11-29

## 2023-12-07 RX ORDER — VALACYCLOVIR HYDROCHLORIDE 500 MG/1
500 TABLET, FILM COATED ORAL 2 TIMES DAILY PRN
Qty: 30 TABLET | Refills: 0 | Status: SHIPPED | OUTPATIENT
Start: 2023-12-07 | End: 2023-12-18

## 2023-12-18 RX ORDER — VALACYCLOVIR HYDROCHLORIDE 500 MG/1
500 TABLET, FILM COATED ORAL 2 TIMES DAILY PRN
Qty: 180 TABLET | Refills: 1 | Status: SHIPPED | OUTPATIENT
Start: 2023-12-18

## 2024-01-11 DIAGNOSIS — E03.9 HYPOTHYROIDISM, UNSPECIFIED TYPE: ICD-10-CM

## 2024-01-12 RX ORDER — LEVOTHYROXINE SODIUM 0.1 MG/1
100 TABLET ORAL
Qty: 90 TABLET | Refills: 1 | Status: SHIPPED | OUTPATIENT
Start: 2024-01-12

## 2024-03-16 ENCOUNTER — OUTPATIENT INFUSION SERVICES (OUTPATIENT)
Dept: ONCOLOGY | Facility: MEDICAL CENTER | Age: 84
End: 2024-03-16
Attending: STUDENT IN AN ORGANIZED HEALTH CARE EDUCATION/TRAINING PROGRAM
Payer: MEDICARE

## 2024-03-16 VITALS
WEIGHT: 92.81 LBS | HEART RATE: 67 BPM | DIASTOLIC BLOOD PRESSURE: 73 MMHG | TEMPERATURE: 97.1 F | SYSTOLIC BLOOD PRESSURE: 127 MMHG | BODY MASS INDEX: 18.22 KG/M2 | HEIGHT: 60 IN | OXYGEN SATURATION: 94 % | RESPIRATION RATE: 18 BRPM

## 2024-03-16 DIAGNOSIS — M81.0 AGE-RELATED OSTEOPOROSIS WITHOUT CURRENT PATHOLOGICAL FRACTURE: ICD-10-CM

## 2024-03-16 LAB
CA-I BLD ISE-SCNC: 1.08 MMOL/L (ref 1.1–1.3)
CREAT BLD-MCNC: 0.6 MG/DL (ref 0.5–1.4)

## 2024-03-16 PROCEDURE — 82330 ASSAY OF CALCIUM: CPT

## 2024-03-16 PROCEDURE — 96372 THER/PROPH/DIAG INJ SC/IM: CPT

## 2024-03-16 PROCEDURE — 82565 ASSAY OF CREATININE: CPT

## 2024-03-16 PROCEDURE — 36415 COLL VENOUS BLD VENIPUNCTURE: CPT

## 2024-03-16 PROCEDURE — 700111 HCHG RX REV CODE 636 W/ 250 OVERRIDE (IP): Mod: JZ,JG | Performed by: STUDENT IN AN ORGANIZED HEALTH CARE EDUCATION/TRAINING PROGRAM

## 2024-03-16 RX ADMIN — DENOSUMAB 60 MG: 60 INJECTION SUBCUTANEOUS at 12:05

## 2024-03-16 ASSESSMENT — FIBROSIS 4 INDEX: FIB4 SCORE: 1.26

## 2024-03-16 NOTE — PROGRESS NOTES
Pt arrived to IS, ambulatory, for Prolia injection. Pt c/o ongoing back pain. Pt denies any recent or upcoming dental surgeries. Labs drawn and reviewed, pt within parameters to treat today. Prolia injected into the L-back arm with no complications. Pt left IS with no s/sx of distress. Follow up appointment confirmed.

## 2024-04-23 ENCOUNTER — APPOINTMENT (OUTPATIENT)
Dept: INTERNAL MEDICINE | Facility: OTHER | Age: 84
End: 2024-04-23
Payer: MEDICARE

## 2024-04-29 RX ORDER — VALACYCLOVIR HYDROCHLORIDE 500 MG/1
500 TABLET, FILM COATED ORAL 2 TIMES DAILY PRN
Qty: 30 TABLET | Refills: 0 | Status: SHIPPED | OUTPATIENT
Start: 2024-04-29

## 2024-04-29 NOTE — TELEPHONE ENCOUNTER
Received request via: Patient    Was the patient seen in the last year in this department? Yes    Does the patient have an active prescription (recently filled or refills available) for medication(s) requested? No    Pharm[facility]     Does the patient have [facility] and need 100 day supply (blood pressure, diabetes and cholesterol meds only)? Patient does not have SCP

## 2024-09-19 ENCOUNTER — PATIENT MESSAGE (OUTPATIENT)
Dept: INTERNAL MEDICINE | Facility: OTHER | Age: 84
End: 2024-09-19
Payer: MEDICARE

## 2024-09-19 NOTE — PATIENT COMMUNICATION
Phone Number Called: [phone] (home)     Call outcome:  Spoke to pt's daughter    Message: Spoke to daughter regarding appt tomorrow. Informed them that tomorrow will only be an accute visit and they will need to reschedule to re-establish with a new provider as Dr. Mondragon has graduated. Pt's daughter stated they are unable to come in earlier that day as pt's mom is in hospice and all they really need is just the Prolia order. Informed daughter that since tomorrow is accute, they will need to most likely re-estab within the next 6 months after this appt for next Prolia infusion.

## 2024-09-20 ENCOUNTER — OFFICE VISIT (OUTPATIENT)
Dept: INTERNAL MEDICINE | Facility: OTHER | Age: 84
End: 2024-09-20
Payer: MEDICARE

## 2024-09-20 VITALS
HEART RATE: 67 BPM | HEIGHT: 61 IN | WEIGHT: 86.6 LBS | TEMPERATURE: 97 F | BODY MASS INDEX: 16.35 KG/M2 | OXYGEN SATURATION: 94 % | SYSTOLIC BLOOD PRESSURE: 124 MMHG | DIASTOLIC BLOOD PRESSURE: 77 MMHG

## 2024-09-20 DIAGNOSIS — I72.5 BASILAR ARTERY ANEURYSM (HCC): ICD-10-CM

## 2024-09-20 DIAGNOSIS — E03.9 HYPOTHYROIDISM, UNSPECIFIED TYPE: ICD-10-CM

## 2024-09-20 DIAGNOSIS — C34.91 NON-SMALL CELL CANCER OF RIGHT LUNG (HCC): ICD-10-CM

## 2024-09-20 DIAGNOSIS — M81.0 AGE-RELATED OSTEOPOROSIS WITHOUT CURRENT PATHOLOGICAL FRACTURE: ICD-10-CM

## 2024-09-20 PROCEDURE — 99214 OFFICE O/P EST MOD 30 MIN: CPT | Performed by: INTERNAL MEDICINE

## 2024-09-20 ASSESSMENT — FIBROSIS 4 INDEX: FIB4 SCORE: 1.26

## 2024-09-20 ASSESSMENT — PATIENT HEALTH QUESTIONNAIRE - PHQ9: CLINICAL INTERPRETATION OF PHQ2 SCORE: 0

## 2024-09-20 NOTE — PROGRESS NOTES
9/20/2024  Chief Complaint   Patient presents with    Follow-Up     Needs denosumab (PROLIA) 60 MG/ML Solution Prefilled Syringe injection            HISTORY OF PRESENT ILLNESS: Patient is a 83 y.o. female established patient who presents today for follow-up. Patient has PMH significant for NSCLC, hypothyroidism, osteoporosis and basilar artery aneurysm.  Today, she presents with her daughter.       Non-small cell cancer of right lung (HCC)  Diagnosed in10/2023, patient has seen [facility] and has opted to forgo chemotherapy. She is with [facility] (start 01/2024), under the care of Dr. Donald. Patient reports overall, she is doing well. Denies any cough, hemoptysis, shortness of breath, chest pain.  Had MRI brain 11/2023 without evidence of metastasis.     Age-related osteoporosis without current pathological fracture  Patient reports she was diagnosed about 3-4 years ago and has been on Prolia since then. She was due for a Prolia injection tomorrow, however, her prescription was not renewed. We discussed whether or not to continue therapy as she is on hospice for now. She expressed that she would like to receive one more injection and re-evaluate from there. However, would like to limit imaging such as DEXA scan.   Last DEXA 2022 with findings of osteoporosis in the left femur, normal bone density of lumbar spine.  Major Osteoporotic     53.3%  Hip     48.4%    Hypothyroidism, unspecified type  Reports that she continues to do well on levothyroxine, denies any abnormal weight loss/gain, temperature intolerane, fatigue, weakness, or constipation. Now being managed by Dr. Donald.     Basilar artery aneurysm (HCC)  Followed by [facility], patient to follow-up in 12/2024 for re-evaluation/imaging.   MRI brain with 9 x11 area mm sized aneurysm is noted at the basilar artery bifurcation.   She denies any dizziness, headache, changes in vision, slurred speech, nausea, vomiting or weakness.     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     Osteoporosis     Painful joint     Right lower lobe lung mass 08/31/2023    Superior segment. >3 cm. Spiculated.     Shortness of breath     Sputum production     Squamous cell skin cancer     Tobacco use 02/28/2020    Wears glasses        Patient Active Problem List    Diagnosis Date Noted    Non-small cell cancer of right lung (HCC) 10/26/2023    Immunization declined 09/01/2023    Right lower lobe lung mass 08/31/2023    Alcohol use 08/01/2022    Macrocytosis 07/02/2021    Leukocytosis 07/02/2021    Advance directive discussed with patient 02/09/2021    Age-related osteoporosis without current pathological fracture 12/22/2020    Osteoporosis 02/28/2020    Vitamin D deficiency, unspecified 02/28/2020    Tobacco use 02/28/2020    Osteoarthritis, generalized 02/28/2020    Neuropathy 02/28/2020    Hypothyroidism 02/28/2020       Allergies:Other environmental    Current Outpatient Medications   Medication Sig Dispense Refill    levothyroxine (SYNTHROID) 100 MCG Tab TAKE 1 TABLET BY MOUTH EVERY DAY IN THE MORNING ON AN EMPTY STOMACH 90 Tablet 1    albuterol 108 (90 Base) MCG/ACT Aero Soln inhalation aerosol Inhale 2 Puffs every 6 hours as needed for Shortness of Breath. 8.5 g 1    denosumab (PROLIA) 60 MG/ML Solution Prefilled Syringe injection PROLIA 60 MG/ML SOSY 1 mL 2    valACYclovir (VALTREX) 500 MG Tab Take 1 Tablet by mouth 2 times a day as needed (severe cold sore). 30 Tablet 0    amoxicillin  (AMOXIL) 500 MG Cap TAKE 1 CAPSULE BY MOUTH 1 (ONE) TIME IF NEEDED FOR UP TO 1 DOSE. 4 Capsule 4    benzonatate (TESSALON) 100 MG Cap Take 1 Capsule by mouth 3 times a day as needed for Cough. 60 Capsule 0    fluticasone (FLONASE) 50 MCG/ACT nasal spray SPRAY 1 SPRAY INTO EACH NOSTRIL EVERY DAY 48 mL 1    NON SPECIFIED every day. Bone up, OTC supplement      Multiple Vitamins-Minerals (EYE VITAMINS & MINERALS PO) Take  by mouth every day.      Cholecalciferol (VITAMIN D3) 125 MCG (5000 UT) Cap Take 1 Capsule by mouth every day.      Calcium Carb-Cholecalciferol (CALCIUM 500+D3 PO) Take  by mouth every day.      Cyanocobalamin (VITAMIN B 12 PO) Take 500 mg by mouth every day.      ibuprofen (MOTRIN) 200 MG Tab Take 200 mg by mouth every 6 hours as needed.      zinc sulfate (ZINCATE) 220 (50 Zn) MG Cap Take 220 mg by mouth every day.       No current facility-administered medications for this visit.       Social History     Tobacco Use    Smoking status: Every Day     Current packs/day: 0.50     Average packs/day: 0.5 packs/day for 61.1 years (30.5 ttl pk-yrs)     Types: Cigarettes     Start date: 9[birth year]6/1963    Smokeless tobacco: Never    Tobacco comments:     9/6/2023 pt reports currently smoking 1/4ppd   Vaping Use    Vaping status: Never Used   Substance Use Topics    Alcohol use: Yes     Alcohol/week: 8.4 oz     Types: 14 Glasses of wine per week     Comment: 2 glasses of wine per day    Drug use: No       Family History   Problem Relation Age of Onset    Hypertension Sister     Heart Disease Sister     Stroke Maternal Aunt     Heart Disease Mother          Review of Systems:   Review of Systems   Constitutional:  Negative for chills, fever and malaise/fatigue.   HENT:  Negative for congestion, sore throat and tinnitus.    Eyes:  Negative for blurred vision and double vision.   Respiratory:  Negative for cough, hemoptysis, sputum production, shortness of breath and wheezing.    Cardiovascular:  Negative for chest  "pain, palpitations and leg swelling.   Gastrointestinal:  Negative for blood in stool, constipation, melena, nausea and vomiting.   Skin:  Negative for itching and rash.   Neurological:  Negative for dizziness, weakness and headaches.   Psychiatric/Behavioral:  Negative for depression. The patient is not nervous/anxious.        Exam:  /77   Pulse 67   Temp 36.1 °C (97 °F) (Temporal)   Ht 1.54 m (5' 0.63\")   Wt 39.3 kg (86 lb 9.6 oz)   SpO2 94%  Body mass index is 16.56 kg/m².  Physical Exam  Constitutional:       Comments: thin   HENT:      Head: Normocephalic and atraumatic.      Right Ear: Tympanic membrane and external ear normal.      Left Ear: Tympanic membrane and external ear normal.      Nose: Nose normal.      Mouth/Throat:      Mouth: Mucous membranes are moist.      Pharynx: Oropharynx is clear.   Eyes:      Extraocular Movements: Extraocular movements intact.      Conjunctiva/sclera: Conjunctivae normal.   Cardiovascular:      Rate and Rhythm: Normal rate and regular rhythm.   Pulmonary:      Effort: Pulmonary effort is normal. No respiratory distress.      Breath sounds: Normal breath sounds. No wheezing, rhonchi or rales.   Abdominal:      Palpations: Abdomen is soft.   Musculoskeletal:      Cervical back: Neck supple.      Right lower leg: No edema.      Left lower leg: No edema.   Skin:     General: Skin is warm and dry.   Neurological:      General: No focal deficit present.      Mental Status: She is alert.   Psychiatric:         Mood and Affect: Mood normal.         Behavior: Behavior normal.         Thought Content: Thought content normal.         Assessment/Plan:     Age-related osteoporosis without current pathological fracture  History of osteoporosis in the left femur with elevated major fracture risk of 53.3%  Patient has been on Prolia for the past 3-4 years.  She has currently on hospice with non-small cell lung cancer of the right lobe.  We discussed whether t continues Prolia " injection as she is on hospice and would like to avoid unneccessary imaging studies.   She would like to have one more Prolia injection.   DEXA scan 11/2022 with osteoporosis of left femur, normal bone density in the lumbar spine    Hypothyroidism, unspecified type  Remains on Levothryoxine, denies any hypo/hyperthyroidism sxs  Per patient managed by Dr. Donald.    Non-small cell lung cancer  Diagnosed 10/2023 right lower lobe, patient has opted to forgo chemotherapy/treatment  Has been with [facility] since 01/2024    Basilar artery aneurysm (HCC)  9 x 11 mm basilar artery aneurysm at bifurcation. She denies any neurologic sxs  Has seen Sandra BRAMBILA, with plans for repeat imaging 12/2024.   She is uncertain if she will have imaging completed as she is on hospice now    All imaging results and lab results and consult notes are reviewed at this visit.  Followup: Return if symptoms worsen or fail to improve.

## 2024-09-21 ENCOUNTER — APPOINTMENT (OUTPATIENT)
Dept: ONCOLOGY | Facility: MEDICAL CENTER | Age: 84
End: 2024-09-21
Attending: STUDENT IN AN ORGANIZED HEALTH CARE EDUCATION/TRAINING PROGRAM
Payer: MEDICARE

## 2024-09-25 ASSESSMENT — ENCOUNTER SYMPTOMS
CONSTIPATION: 0
NERVOUS/ANXIOUS: 0
SORE THROAT: 0
HEADACHES: 0
BLOOD IN STOOL: 0
WHEEZING: 0
VOMITING: 0
NAUSEA: 0
DIZZINESS: 0
DEPRESSION: 0
SPUTUM PRODUCTION: 0
DOUBLE VISION: 0
SHORTNESS OF BREATH: 0
BLURRED VISION: 0
CHILLS: 0
HEMOPTYSIS: 0
WEAKNESS: 0
FEVER: 0
COUGH: 0
PALPITATIONS: 0

## 2024-10-03 ENCOUNTER — OUTPATIENT INFUSION SERVICES (OUTPATIENT)
Dept: ONCOLOGY | Facility: MEDICAL CENTER | Age: 84
End: 2024-10-03
Attending: INTERNAL MEDICINE
Payer: MEDICARE

## 2024-10-03 VITALS
HEIGHT: 60 IN | BODY MASS INDEX: 16.88 KG/M2 | DIASTOLIC BLOOD PRESSURE: 68 MMHG | SYSTOLIC BLOOD PRESSURE: 135 MMHG | TEMPERATURE: 96.7 F | WEIGHT: 85.98 LBS | OXYGEN SATURATION: 95 % | RESPIRATION RATE: 18 BRPM | HEART RATE: 59 BPM

## 2024-10-03 DIAGNOSIS — M81.0 AGE-RELATED OSTEOPOROSIS WITHOUT CURRENT PATHOLOGICAL FRACTURE: ICD-10-CM

## 2024-10-03 LAB
CA-I BLD ISE-SCNC: 1.17 MMOL/L (ref 1.1–1.3)
CREAT BLD-MCNC: 0.6 MG/DL (ref 0.5–1.4)

## 2024-10-03 PROCEDURE — 700111 HCHG RX REV CODE 636 W/ 250 OVERRIDE (IP): Mod: JZ,JG | Performed by: INTERNAL MEDICINE

## 2024-10-03 PROCEDURE — 82330 ASSAY OF CALCIUM: CPT

## 2024-10-03 PROCEDURE — 82565 ASSAY OF CREATININE: CPT

## 2024-10-03 PROCEDURE — 36415 COLL VENOUS BLD VENIPUNCTURE: CPT

## 2024-10-03 PROCEDURE — 96372 THER/PROPH/DIAG INJ SC/IM: CPT

## 2024-10-03 RX ORDER — METHADONE HYDROCHLORIDE 5 MG/1
5 TABLET ORAL DAILY
COMMUNITY

## 2024-10-03 RX ADMIN — DENOSUMAB 60 MG: 60 INJECTION SUBCUTANEOUS at 12:05

## 2024-10-03 ASSESSMENT — FIBROSIS 4 INDEX: FIB4 SCORE: 1.26

## 2025-04-05 ENCOUNTER — OUTPATIENT INFUSION SERVICES (OUTPATIENT)
Dept: ONCOLOGY | Facility: MEDICAL CENTER | Age: 85
End: 2025-04-05
Attending: INTERNAL MEDICINE
Payer: MEDICARE

## 2025-04-05 VITALS
RESPIRATION RATE: 17 BRPM | OXYGEN SATURATION: 90 % | HEIGHT: 61 IN | SYSTOLIC BLOOD PRESSURE: 119 MMHG | BODY MASS INDEX: 16.15 KG/M2 | TEMPERATURE: 97.7 F | WEIGHT: 85.54 LBS | DIASTOLIC BLOOD PRESSURE: 65 MMHG | HEART RATE: 76 BPM

## 2025-04-05 DIAGNOSIS — M81.0 AGE-RELATED OSTEOPOROSIS WITHOUT CURRENT PATHOLOGICAL FRACTURE: ICD-10-CM

## 2025-04-05 LAB
CA-I BLD ISE-SCNC: 1.16 MMOL/L (ref 1.1–1.3)
CREAT BLD-MCNC: 0.7 MG/DL (ref 0.5–1.4)

## 2025-04-05 PROCEDURE — 96372 THER/PROPH/DIAG INJ SC/IM: CPT

## 2025-04-05 PROCEDURE — 700111 HCHG RX REV CODE 636 W/ 250 OVERRIDE (IP): Mod: JZ,TB | Performed by: INTERNAL MEDICINE

## 2025-04-05 PROCEDURE — 82565 ASSAY OF CREATININE: CPT

## 2025-04-05 PROCEDURE — 82330 ASSAY OF CALCIUM: CPT

## 2025-04-05 PROCEDURE — 36415 COLL VENOUS BLD VENIPUNCTURE: CPT

## 2025-04-05 RX ADMIN — DENOSUMAB 60 MG: 60 INJECTION SUBCUTANEOUS at 11:34

## 2025-04-05 ASSESSMENT — FIBROSIS 4 INDEX: FIB4 SCORE: 1.27

## 2025-04-05 NOTE — PROGRESS NOTES
Ruby presents to infusion for Q6 month Prolia injection. Patient confirms taking daily calcium and vitamin D and denies s/s of hypocalcemia and any recent/upcoming dental surgeries.     Labs drawn via venipuncture to left forearm. Labs reviewed by RN and pharmacy, within parameters for treatment today.     Prolia given SQ in back left arm, bandaid applied. Ruby tolerated well with no adverse effects.     Patient left in stable condition, verified next appointment.

## 2025-06-17 ENCOUNTER — OFFICE VISIT (OUTPATIENT)
Dept: URGENT CARE | Facility: CLINIC | Age: 85
End: 2025-06-17
Payer: MEDICARE

## 2025-06-17 VITALS
DIASTOLIC BLOOD PRESSURE: 66 MMHG | BODY MASS INDEX: 16.62 KG/M2 | TEMPERATURE: 97.2 F | HEART RATE: 60 BPM | WEIGHT: 88 LBS | HEIGHT: 61 IN | SYSTOLIC BLOOD PRESSURE: 110 MMHG | RESPIRATION RATE: 14 BRPM | OXYGEN SATURATION: 95 %

## 2025-06-17 DIAGNOSIS — H61.22 IMPACTED CERUMEN OF LEFT EAR: Primary | ICD-10-CM

## 2025-06-17 PROCEDURE — 3078F DIAST BP <80 MM HG: CPT | Performed by: NURSE PRACTITIONER

## 2025-06-17 PROCEDURE — 69210 REMOVE IMPACTED EAR WAX UNI: CPT | Performed by: NURSE PRACTITIONER

## 2025-06-17 PROCEDURE — 3074F SYST BP LT 130 MM HG: CPT | Performed by: NURSE PRACTITIONER

## 2025-06-17 ASSESSMENT — FIBROSIS 4 INDEX: FIB4 SCORE: 1.27

## 2025-06-17 ASSESSMENT — ENCOUNTER SYMPTOMS
CONSTITUTIONAL NEGATIVE: 1
FEVER: 0

## 2025-06-17 ASSESSMENT — VISUAL ACUITY: OU: 1

## 2025-06-17 NOTE — PROGRESS NOTES
Subjective:     Ruby Buckner is a 84 y.o. female who presents for Ear Pain (Ear pain left side  )       Ear Fullness  This is a new problem. The current episode started in the past 7 days. Pertinent negatives include no fever.     Daughter present.    She concerned of left earwax buildup.  Has used Debrox.    Denies hearing aid or ear buds use.    Review of Systems   Constitutional: Negative.  Negative for fever.   HENT:  Positive for hearing loss. Negative for ear pain.    All other systems reviewed and are negative.    Refer to [facility] for additional details.    During this visit, appropriate PPE was worn, and hand hygiene was performed.    PMH:  has a past medical history of Anesthesia, Arthritis, Back pain, Basal cell adenocarcinoma, Chickenpox, Cough, Emphysema of lung (HCC), Hearing difficulty, Heart valve disease (Leakage in mitral), Hypertension, Hypothyroidism, Immunization declined (09/01/2023), Osteoporosis, Painful joint, Right lower lobe lung mass (08/31/2023), Shortness of breath, Sputum production, Squamous cell skin cancer, Tobacco use (02/28/2020), and Wears glasses.    She has no past medical history of Acute nasopharyngitis, Anginal syndrome (HCC), Arrhythmia, Asthma, Blood clotting disorder (HCC), Bowel habit changes, Bronchitis, Carcinoma in situ of respiratory system, Cataract, Congestive heart failure (HCC), Continuous ambulatory peritoneal dialysis status (HCC), Coughing blood, Dental disorder, Diabetes (HCC), Dialysis patient (HCC), Glaucoma, Gynecological disorder, Heart burn, Heart murmur, Hemorrhagic disorder (HCC), Hepatitis A, Hepatitis B, Hepatitis C, Hiatus hernia syndrome, High cholesterol, Indigestion, Jaundice, Myocardial infarct (HCC), Pacemaker, Pneumonia, Pregnant, Psychiatric problem, Renal disorder, Rheumatic fever, Seizure (HCC), Sleep apnea, Snoring, Stroke (HCC), Tuberculosis, Urinary bladder disorder, or Urinary incontinence.    MEDS: Current Medications[1]    ALLERGIES:  "Allergies[2]  SURGHX: Past Surgical History[3]  SOCHX:  reports that she has been smoking cigarettes. She started smoking about 61 years ago. She has a 30.9 pack-year smoking history. She has never used smokeless tobacco. She reports current alcohol use of about 8.4 oz of alcohol per week. She reports that she does not use drugs.    FH: Per HPI as applicable/pertinent.      Objective:     /66 (BP Location: Left arm, Patient Position: Sitting, BP Cuff Size: Adult)   Pulse 60   Temp 36.2 °C (97.2 °F) (Temporal)   Resp 14   Ht 1.549 m (5' 1\")   Wt 39.9 kg (88 lb)   SpO2 95%   BMI 16.63 kg/m²     Physical Exam  Nursing note reviewed.   Constitutional:       General: She is not in acute distress.     Appearance: She is well-developed. She is not ill-appearing or toxic-appearing.   HENT:      Right Ear: Tympanic membrane, ear canal and external ear normal.      Left Ear: There is impacted cerumen.   Eyes:      General: Vision grossly intact.   Cardiovascular:      Rate and Rhythm: Normal rate.   Pulmonary:      Effort: Pulmonary effort is normal. No respiratory distress.   Musculoskeletal:         General: No deformity. Normal range of motion.   Skin:     Coloration: Skin is not pale.   Neurological:      Mental Status: She is alert and oriented to person, place, and time.      Motor: No weakness.   Psychiatric:         Behavior: Behavior normal. Behavior is cooperative.       Assessment/Plan:     1. Impacted cerumen of left ear    Ear lavage was performed in left external auditory canal with small amount of cerumen removed by MA. Was not able to continue due to discomfort. Otherwise, no complications.     Procedure: Cerumen Removal from Left External Auditory Canal  - Using a curette, I personally removed a large amount of cerumen removed from the right external auditory canal.  - TM and EAC intact with no erythema, bulging, or perforation.  - Patient reports hearing has improved significantly in the left " ear.  - Patient tolerated well with no complications.     Excess cerumen at right EAC also removed with curette.  No complications.    Differential diagnosis, natural history, supportive care, over-the-counter symptom management per 's instructions, close monitoring, and indications for immediate follow-up discussed.     All questions answered. Patient agrees with the plan of care.         [1]   Current Outpatient Medications:     methadone (DOLOPHINE) 5 MG Tab, Take 5 mg by mouth every day., Disp: , Rfl:     valACYclovir (VALTREX) 500 MG Tab, Take 1 Tablet by mouth 2 times a day as needed (severe cold sore)., Disp: 30 Tablet, Rfl: 0    levothyroxine (SYNTHROID) 100 MCG Tab, TAKE 1 TABLET BY MOUTH EVERY DAY IN THE MORNING ON AN EMPTY STOMACH, Disp: 90 Tablet, Rfl: 1    amoxicillin (AMOXIL) 500 MG Cap, TAKE 1 CAPSULE BY MOUTH 1 (ONE) TIME IF NEEDED FOR UP TO 1 DOSE., Disp: 4 Capsule, Rfl: 4    albuterol 108 (90 Base) MCG/ACT Aero Soln inhalation aerosol, Inhale 2 Puffs every 6 hours as needed for Shortness of Breath., Disp: 8.5 g, Rfl: 1    fluticasone (FLONASE) 50 MCG/ACT nasal spray, SPRAY 1 SPRAY INTO EACH NOSTRIL EVERY DAY, Disp: 48 mL, Rfl: 1    NON SPECIFIED, every day. Bone up, OTC supplement, Disp: , Rfl:     Multiple Vitamins-Minerals (EYE VITAMINS & MINERALS PO), Take  by mouth every day., Disp: , Rfl:     Cholecalciferol (VITAMIN D3) 125 MCG (5000 UT) Cap, Take 1 Capsule by mouth every day., Disp: , Rfl:     Calcium Carb-Cholecalciferol (CALCIUM 500+D3 PO), Take  by mouth every day., Disp: , Rfl:     Cyanocobalamin (VITAMIN B 12 PO), Take 500 mg by mouth every day., Disp: , Rfl:     ibuprofen (MOTRIN) 200 MG Tab, Take 200 mg by mouth every 6 hours as needed., Disp: , Rfl:     denosumab (PROLIA) 60 MG/ML Solution Prefilled Syringe injection, PROLIA 60 MG/ML SOSY, Disp: 1 mL, Rfl: 2    zinc sulfate (ZINCATE) 220 (50 Zn) MG Cap, Take 220 mg by mouth every day., Disp: , Rfl:     benzonatate  (TESSALON) 100 MG Cap, Take 1 Capsule by mouth 3 times a day as needed for Cough. (Patient not taking: Reported on 6/17/2025), Disp: 60 Capsule, Rfl: 0  [2]   Allergies  Allergen Reactions    Other Environmental      Seasonal allergies   [3]   Past Surgical History:  Procedure Laterality Date    SD BRONCHOSCOPY,DIAGNOSTIC N/A 10/10/2023    Procedure: FIBER OPTIC BRONCHOSCOPY WITH WASH, BRUSH, BRONCHOALVEOLAR LAVAGE, BIOPSY, FINE NEEDLE ASPIRATION & NAVIGATION, ROBOTICS;  Surgeon: Sarah Sanders M.D.;  Location: [facility];  Service: Pulmonary Robotic    ENDOBRONCHIAL US ADD-ON N/A 10/10/2023    Procedure: ENDOBRONCHIAL ULTRASOUND (EBUS);  Surgeon: Sarah Sanders M.D.;  Location: [facility];  Service: Pulmonary Robotic    HIP REPLACEMENT, TOTAL Right 2017    ROTATOR CUFF REPAIR Bilateral 1990    three surgeries    ABDOMINAL HYSTERECTOMY TOTAL      CATARACT EXTRACTION WITH IOL Bilateral     TONSILLECTOMY

## (undated) DEVICE — FORCEPS BRONCH DISPOSABLE ALLIGATOR JAW (20EA/BX)

## (undated) DEVICE — CANNULA O2 COMFORT SOFT EAR ADULT 7 FT TUBING (50/CA)

## (undated) DEVICE — KIT  I.V. START (100EA/CA)

## (undated) DEVICE — CANISTER SUCTION RIGID RED 1500CC (40EA/CA)

## (undated) DEVICE — NEEDLE BIOPSY FLEXISION OD21 GA IF1000 (5EA/BX)

## (undated) DEVICE — TUBE SUCTION YANKAUER  1/4 X 6FT (20EA/CA)"

## (undated) DEVICE — SPONGE GAUZE NON-STERILE 4X4 - (2000/CA 10PK/CA)

## (undated) DEVICE — BRONCHOSCOPE EXALT MODEL B REGULAR

## (undated) DEVICE — CANISTER SUCTION 3000ML MECHANICAL FILTER AUTO SHUTOFF MEDI-VAC NONSTERILE LF DISP  (40EA/CA)

## (undated) DEVICE — TUBE CONNECTING SUCTION - CLEAR PLASTIC STERILE 72 IN (50EA/CA)

## (undated) DEVICE — SET EXTENSION WITH 2 PORTS (48EA/CA) ***PART #2C8610 IS A SUBSTITUTE*****

## (undated) DEVICE — MASK WITH FACE SHIELD (25/BX 4BX/CA)

## (undated) DEVICE — BAG IV VISION ION IF1000 (10EA/BX)

## (undated) DEVICE — WATER IRRIGATION STERILE 1000ML (12EA/CA)

## (undated) DEVICE — SUCTION INSTRUMENT YANKAUER BULBOUS TIP W/O VENT (50EA/CA)

## (undated) DEVICE — SYRINGE SAFETY 10 ML 18 GA X 1 1/2 BLUNT LL (100/BX 4BX/CA)

## (undated) DEVICE — CATHETER GUIDING ION (1/EA)

## (undated) DEVICE — SYRINGE DISP. 60 CC LL - (30/BX, 12BX/CA)**WHEN THESE ARE GONE ORDER #500206**

## (undated) DEVICE — SYRINGE SAFETY 3 ML 18 GA X 1 1/2 BLUNT LL (100/BX 8BX/CA)

## (undated) DEVICE — ROBOTIC SURGERY SERVICES

## (undated) DEVICE — SENSOR OXIMETER ADULT SPO2 RD SET (20EA/BX)

## (undated) DEVICE — TUBE E-T HI-LO CUFF 8.0MM (10EA/PK)

## (undated) DEVICE — SYRINGE SAFETY 5 ML 18 GA X 1-1/2 BLUNT LL (100/BX 4BX/CA)

## (undated) DEVICE — LACTATED RINGERS INJ 1000 ML - (14EA/CA 60CA/PF)

## (undated) DEVICE — CATHETER IV SAFETY 20 GA X 1-1/4 (50/BX)

## (undated) DEVICE — GOWN SURGEONS LARGE - (32/CA)

## (undated) DEVICE — TUBING CLEARLINK DUO-VENT - C-FLO (48EA/CA)

## (undated) DEVICE — PROBE ENDOSCOPIC PERIPHERAL VISION ION 1.5 IF1000 (1/EA)